# Patient Record
Sex: FEMALE | Race: WHITE | NOT HISPANIC OR LATINO | Employment: FULL TIME | URBAN - METROPOLITAN AREA
[De-identification: names, ages, dates, MRNs, and addresses within clinical notes are randomized per-mention and may not be internally consistent; named-entity substitution may affect disease eponyms.]

---

## 2017-02-03 ENCOUNTER — INITIAL PRENATAL (OUTPATIENT)
Dept: OBSTETRICS AND GYNECOLOGY | Facility: CLINIC | Age: 27
End: 2017-02-03

## 2017-02-03 ENCOUNTER — LAB VISIT (OUTPATIENT)
Dept: LAB | Facility: HOSPITAL | Age: 27
End: 2017-02-03
Attending: OBSTETRICS & GYNECOLOGY

## 2017-02-03 VITALS
BODY MASS INDEX: 21.98 KG/M2 | SYSTOLIC BLOOD PRESSURE: 110 MMHG | DIASTOLIC BLOOD PRESSURE: 68 MMHG | WEIGHT: 148.81 LBS

## 2017-02-03 DIAGNOSIS — Z32.01 PREGNANCY TEST POSITIVE: Primary | ICD-10-CM

## 2017-02-03 DIAGNOSIS — Z32.01 PREGNANCY TEST POSITIVE: ICD-10-CM

## 2017-02-03 DIAGNOSIS — Z34.01 ENCOUNTER FOR SUPERVISION OF NORMAL FIRST PREGNANCY IN FIRST TRIMESTER: ICD-10-CM

## 2017-02-03 LAB
BASOPHILS # BLD AUTO: 0.04 K/UL
BASOPHILS NFR BLD: 0.4 %
BILIRUB UR QL STRIP: NEGATIVE
CLARITY UR REFRACT.AUTO: ABNORMAL
COLOR UR AUTO: YELLOW
DIFFERENTIAL METHOD: ABNORMAL
EOSINOPHIL # BLD AUTO: 0.2 K/UL
EOSINOPHIL NFR BLD: 1.9 %
ERYTHROCYTE [DISTWIDTH] IN BLOOD BY AUTOMATED COUNT: 12.7 %
GLUCOSE UR QL STRIP: NEGATIVE
HCT VFR BLD AUTO: 36.3 %
HGB BLD-MCNC: 12.5 G/DL
HGB UR QL STRIP: NEGATIVE
KETONES UR QL STRIP: NEGATIVE
LEUKOCYTE ESTERASE UR QL STRIP: NEGATIVE
LYMPHOCYTES # BLD AUTO: 2.4 K/UL
LYMPHOCYTES NFR BLD: 21.9 %
MCH RBC QN AUTO: 30.1 PG
MCHC RBC AUTO-ENTMCNC: 34.4 %
MCV RBC AUTO: 88 FL
MONOCYTES # BLD AUTO: 0.9 K/UL
MONOCYTES NFR BLD: 8.2 %
NEUTROPHILS # BLD AUTO: 7.3 K/UL
NEUTROPHILS NFR BLD: 67.3 %
NITRITE UR QL STRIP: NEGATIVE
PH UR STRIP: 6 [PH] (ref 5–8)
PLATELET # BLD AUTO: 282 K/UL
PMV BLD AUTO: 10.8 FL
PROT UR QL STRIP: NEGATIVE
RBC # BLD AUTO: 4.15 M/UL
SP GR UR STRIP: 1.02 (ref 1–1.03)
URN SPEC COLLECT METH UR: ABNORMAL
UROBILINOGEN UR STRIP-ACNC: NEGATIVE EU/DL
WBC # BLD AUTO: 10.88 K/UL

## 2017-02-03 PROCEDURE — 86850 RBC ANTIBODY SCREEN: CPT

## 2017-02-03 PROCEDURE — 86900 BLOOD TYPING SEROLOGIC ABO: CPT

## 2017-02-03 PROCEDURE — 87086 URINE CULTURE/COLONY COUNT: CPT

## 2017-02-03 PROCEDURE — 88175 CYTOPATH C/V AUTO FLUID REDO: CPT

## 2017-02-03 PROCEDURE — 81003 URINALYSIS AUTO W/O SCOPE: CPT

## 2017-02-03 PROCEDURE — 99213 OFFICE O/P EST LOW 20 MIN: CPT | Mod: PBBFAC | Performed by: OBSTETRICS & GYNECOLOGY

## 2017-02-03 PROCEDURE — 87340 HEPATITIS B SURFACE AG IA: CPT

## 2017-02-03 PROCEDURE — 86703 HIV-1/HIV-2 1 RESULT ANTBDY: CPT

## 2017-02-03 PROCEDURE — 0502F SUBSEQUENT PRENATAL CARE: CPT | Mod: ,,, | Performed by: OBSTETRICS & GYNECOLOGY

## 2017-02-03 PROCEDURE — 85025 COMPLETE CBC W/AUTO DIFF WBC: CPT

## 2017-02-03 PROCEDURE — 86762 RUBELLA ANTIBODY: CPT

## 2017-02-03 PROCEDURE — 36415 COLL VENOUS BLD VENIPUNCTURE: CPT

## 2017-02-03 PROCEDURE — 99999 PR PBB SHADOW E&M-EST. PATIENT-LVL III: CPT | Mod: PBBFAC,,, | Performed by: OBSTETRICS & GYNECOLOGY

## 2017-02-03 PROCEDURE — 86592 SYPHILIS TEST NON-TREP QUAL: CPT

## 2017-02-03 PROCEDURE — 87591 N.GONORRHOEAE DNA AMP PROB: CPT

## 2017-02-03 NOTE — MR AVS SNAPSHOT
[facility] - OB/ GYN  [address]  [place] [address]  Phone: [phone]  Fax: [phone]                  Tawanna Harp   2/3/2017 1:15 PM   Initial Prenatal    Description:  Female : 1990   Provider:  Darlene Emerson MD   Department:  [facility] - [facility]/ [facility]           Reason for Visit     Initial Prenatal Visit           Diagnoses this Visit        Comments    Pregnancy test positive    -  Primary            To Do List           Future Appointments        Provider Department Dept Phone    2/3/2017 3:00 PM LAB, SAME DAY [facility] [phone]    2017 4:30 PM ULTRASOUND, OB-GYN [facility] OB/ [facility] [phone]    2017 5:00 PM CAMELIA Allred MD Counts include 234 beds at the [facility] OB/ [facility] [phone]      Goals (5 Years of Data)     None      [facility]     [facility] [place] [facility] Line - 24/7 Assistance  Registered nurses in the [facility] provide clinical advisement, health education, appointment booking, and other advisory services.  Call for this free service at [phone].             Medications           Message regarding Medications     Verify the changes and/or additions to your medication regime listed below are the same as discussed with your clinician today.  If any of these changes or additions are incorrect, please notify your healthcare provider.             Verify that the below list of medications is an accurate representation of the medications you are currently taking.  If none reported, the list may be blank. If incorrect, please contact your healthcare provider. Carry this list with you in case of emergency.                Clinical Reference Information           Prenatal Vitals     Enc. Date GA Prenatal Vitals Prenatal Pulse Pain Level Urine Albumin/Glucose Edema Presentation Dilation/Effacement/Station    2/3/17  110/68 / 67.5 kg (148 lb 13 oz)             Your Vitals Were     BP Weight Last Period BMI       110/68  67.5 kg (148 lb 13 oz) 12/07/2016 21.98 kg/m2       Allergies as of 2/3/2017     No Known Allergies      Immunizations Administered on Date of Encounter - 2/3/2017     None      Orders Placed During Today's Visit      Normal Orders This Visit    Urinalysis     Urine culture     Future Labs/Procedures Expected by Expires    CBC auto differential  2/3/2017 4/4/2018    Hepatitis B surface antigen  2/3/2017 4/4/2018    HIV-1 and HIV-2 antibodies  2/3/2017 4/4/2018    RPR  2/3/2017 4/4/2018    Rubella antibody, IgG  2/3/2017 4/4/2018    Type & Screen  2/3/2017 4/4/2018    US OB/GYN Procedure ([facility])  As directed 2/3/2018      Language Assistance Services     ATTENTION: Language assistance services are available, free of charge. Please call [phone].      ATENCIÓN: Si habla español, tiene a nixon disposición servicios gratuitos de asistencia lingüística. Llame al [phone].     CHÚ Ý: N?u b?n nói Ti?ng Vi?t, có các d?ch v? h? tr? ngôn ng? mi?n phí dành cho b?n. G?i s? [phone].         [facility] complies with applicable Federal civil rights laws and does not discriminate on the basis of race, color, national origin, age, disability, or sex.

## 2017-02-04 PROBLEM — Z34.00 ENCOUNTER FOR SUPERVISION OF NORMAL FIRST PREGNANCY: Status: ACTIVE | Noted: 2017-02-04

## 2017-02-04 LAB
ABO + RH BLD: NORMAL
BLD GP AB SCN CELLS X3 SERPL QL: NORMAL
RPR SER QL: NORMAL

## 2017-02-04 NOTE — PROGRESS NOTES
Patient is 26 year old G1 at 8 weeks EGA by LMP 16.  No complaints.  Mild nausea.  Light spotting and cramping several weeks ago, which has now resolved.  Pregnancy is planned.   x 4 months.      OB History    Para Term  AB SAB TAB Ectopic Multiple Living   1               # Outcome Date GA Lbr Jarad/2nd Weight Sex Delivery Anes PTL Lv   1 Current                   Dating reviewed    Allergies and problem list reviewed and updated    Medical and surgical history reviewed    Prenatal labs reviewed and updated    Physical Exam:  BREASTS:  No masses, nontender, no skin changes, normal nipples  ABD: soft, gravid, nontender  PELVIC: uterus 8 week size, cervix closed, no abnormalities  Pap and gen probe done.    Assessment:  Pregnancy at 8 weeks EGA    Plan:   Initial OB labs today.  Follow up in 4 weeks with dating ultrasound.

## 2017-02-05 LAB — BACTERIA UR CULT: NO GROWTH

## 2017-02-06 LAB
HBV SURFACE AG SERPL QL IA: NEGATIVE
HIV 1+2 AB+HIV1 P24 AG SERPL QL IA: NEGATIVE
RUBV IGG SER-ACNC: 8.6 IU/ML
RUBV IGG SER-IMP: ABNORMAL

## 2017-02-07 PROBLEM — O09.899 RUBELLA NON-IMMUNE STATUS, ANTEPARTUM: Status: ACTIVE | Noted: 2017-02-07

## 2017-02-07 PROBLEM — Z28.39 RUBELLA NON-IMMUNE STATUS, ANTEPARTUM: Status: ACTIVE | Noted: 2017-02-07

## 2017-02-07 LAB
C TRACH DNA SPEC QL NAA+PROBE: NEGATIVE
N GONORRHOEA DNA SPEC QL NAA+PROBE: NEGATIVE

## 2017-02-25 ENCOUNTER — NURSE TRIAGE (OUTPATIENT)
Dept: ADMINISTRATIVE | Facility: CLINIC | Age: 27
End: 2017-02-25

## 2017-02-25 ENCOUNTER — HOSPITAL ENCOUNTER (EMERGENCY)
Facility: HOSPITAL | Age: 27
Discharge: HOME | End: 2017-02-25
Attending: EMERGENCY MEDICINE

## 2017-02-25 VITALS
HEIGHT: 69 IN | HEART RATE: 65 BPM | WEIGHT: 148 LBS | RESPIRATION RATE: 16 BRPM | BODY MASS INDEX: 21.92 KG/M2 | SYSTOLIC BLOOD PRESSURE: 122 MMHG | OXYGEN SATURATION: 99 % | DIASTOLIC BLOOD PRESSURE: 67 MMHG | TEMPERATURE: 98 F

## 2017-02-25 DIAGNOSIS — O03.4 INCOMPLETE ABORTION: Primary | ICD-10-CM

## 2017-02-25 LAB
BILIRUB UR QL STRIP: NEGATIVE
CLARITY UR: CLEAR
COLOR UR: YELLOW
GLUCOSE UR QL STRIP: NEGATIVE
HGB UR QL STRIP: ABNORMAL
KETONES UR QL STRIP: NEGATIVE
LEUKOCYTE ESTERASE UR QL STRIP: NEGATIVE
MICROSCOPIC COMMENT: NORMAL
NITRITE UR QL STRIP: NEGATIVE
PH UR STRIP: 8 [PH] (ref 5–8)
PROT UR QL STRIP: NEGATIVE
RBC #/AREA URNS HPF: 2 /HPF (ref 0–4)
SP GR UR STRIP: 1.01 (ref 1–1.03)
URN SPEC COLLECT METH UR: ABNORMAL
UROBILINOGEN UR STRIP-ACNC: NEGATIVE EU/DL

## 2017-02-25 PROCEDURE — 81000 URINALYSIS NONAUTO W/SCOPE: CPT

## 2017-02-25 PROCEDURE — 99284 EMERGENCY DEPT VISIT MOD MDM: CPT

## 2017-02-25 NOTE — ED AVS SNAPSHOT
[facility]  [address] [address]               Tawanna Harp   2017 11:33 AM   ED    Description:  Female : 1990   Department:  [facility]            Your Care was Coordinated By:     Provider Role From To    Ruslan Rene MD Attending Provider 17 1133 --      Reason for Visit     Vaginal Bleeding           Diagnoses this Visit        Comments    Incomplete     -  Primary       ED Disposition     ED Disposition Condition Comment    Discharge             To Do List           Follow-up Information     Follow up with OB/GYN In 3 days.      [facility]     [facility] -  Assistance  Registered nurses in the [facility] provide clinical advisement, health education, appointment booking, and other advisory services.  Call for this free service at [phone].             Medications           Message regarding Medications     Verify the changes and/or additions to your medication regime listed below are the same as discussed with your clinician today.  If any of these changes or additions are incorrect, please notify your healthcare provider.             Verify that the below list of medications is an accurate representation of the medications you are currently taking.  If none reported, the list may be blank. If incorrect, please contact your healthcare provider. Carry this list with you in case of emergency.                Clinical Reference Information           Your Vitals Were     BP                   122/67 (BP Location: Right arm, Patient Position: Sitting)           Allergies as of 2017     No Known Allergies      Immunizations Administered on Date of Encounter - 2017     None      ED Micro, Lab, POCT     Start Ordered       Status Ordering Provider    17 1139 17 1138  Urinalysis  STAT      Final result     17 1138 17 1138   Urinalysis Microscopic  Once      Final result       ED Imaging Orders     Start Ordered       Status Ordering Provider    02/25/17 1204 02/25/17 1203  US OB Less Than 14 Wks First Gestation  1 time imaging      In process         Discharge Instructions         Understanding Miscarriage: Possible Causes  Miscarriage is common, but finding its cause may not be easy. If a cause can be found, its likely to be a problem with the baby or the structure of the uterus. Other factors cause miscarriage, but they are less common.  Problems with the baby  Either of the following problems with the baby can cause a miscarriage:  · There is a problem with the babys chromosomes (genes that carry the information needed for life).  · Birth defects  Problems with the uterus or cervix  Any of the following problems with the uterus or cervix can cause a miscarriage:  · The uterus may be divided (have a septum), or have fibroids or adhesions.  · The lining of the uterus may be too thin for the fertilized egg to implant.  · The cervix may be too weak to support the weight of a pregnancy.  Other factors  Any of the following problems can cause a miscarriage:  · A serious illness, such as uncontrolled dabetes mellitus.   · A bad injury, perhaps during a car accident.  · Exposure to toxins or radiation.  What does not cause miscarriage  Plenty of myths and old wives tales try to explain the cause of miscarriage. But they are fiction--not fact. None of the following activities causes miscarriage:  · Carrying groceries  · Lifting a small child  · Wearing high heels  · Coloring your hair  · Having sex  · Vacuuming · Working outside the home  · Being a vegetarian  · Eating spicy foods  · Having a Pap smear  · Riding a horse or a bicycle  · Wishing away or denying a pregnancy       Date Last Reviewed: 6/1/2016  © 7299-8119 [employer]. [address] [place] [address], [place] [address]. All rights reserved. This information is not  intended as a substitute for professional medical care. Always follow your healthcare professional's instructions.          Your Scheduled Appointments     Feb 28, 2017  4:30 PM CST   Ultrasound with ULTRASOUND, OB-GYN [facility]    [id number] [place] [id number]   [phone]            Feb 28, 2017  5:00 PM CST   Routine Prenatal Visit with CAMELIA Allred MD   [facility]    [id number] [place] [id number]   [phone]               [facility] complies with applicable Federal civil rights laws and does not discriminate on the basis of race, color, national origin, age, disability, or sex.        Language Assistance Services     ATTENTION: Language assistance services are available, free of charge. Please call [phone].      ATENCIÓN: Si habla español, tiene a nixon disposición servicios gratuitos de asistencia lingüística. Llame al [phone].     LAKSHMI Ý: N?u b?n nói Ti?ng Vi?t, có các d?ch v? h? tr? ngôn ng? mi?n phí beckyh cho b?n. G?i s? [phone].

## 2017-02-25 NOTE — DISCHARGE INSTRUCTIONS
Understanding Miscarriage: Possible Causes  Miscarriage is common, but finding its cause may not be easy. If a cause can be found, its likely to be a problem with the baby or the structure of the uterus. Other factors cause miscarriage, but they are less common.  Problems with the baby  Either of the following problems with the baby can cause a miscarriage:  · There is a problem with the babys chromosomes (genes that carry the information needed for life).  · Birth defects  Problems with the uterus or cervix  Any of the following problems with the uterus or cervix can cause a miscarriage:  · The uterus may be divided (have a septum), or have fibroids or adhesions.  · The lining of the uterus may be too thin for the fertilized egg to implant.  · The cervix may be too weak to support the weight of a pregnancy.  Other factors  Any of the following problems can cause a miscarriage:  · A serious illness, such as uncontrolled dabetes mellitus.   · A bad injury, perhaps during a car accident.  · Exposure to toxins or radiation.  What does not cause miscarriage  Plenty of myths and old wives tales try to explain the cause of miscarriage. But they are fiction--not fact. None of the following activities causes miscarriage:  · Carrying groceries  · Lifting a small child  · Wearing high heels  · Coloring your hair  · Having sex  · Vacuuming · Working outside the home  · Being a vegetarian  · Eating spicy foods  · Having a Pap smear  · Riding a horse or a bicycle  · Wishing away or denying a pregnancy       Date Last Reviewed: 6/1/2016  © 3296-3798 "[employer]. [address] [place] [address], [place] [address]. All rights reserved. This information is not intended as a substitute for professional medical care. Always follow your healthcare professional's instructions.

## 2017-02-25 NOTE — TELEPHONE ENCOUNTER
"  Reason for Disposition   [1] MODERATE vaginal bleeding (i.e., soaking 1 pad / hour; clots) AND [2] present > 6 hours    Answer Assessment - Initial Assessment Questions  1. ONSET: "When did this bleeding start?"        This morning   2. DESCRIPTION: "Describe the bleeding that you are having." "How much bleeding is there?"     - SPOTTING: spotting, or pinkish / brownish mucous discharge; does not fill panti-liner or pad     - MILD:  less than 1 pad / hour; less than patient's usual menstrual bleeding    - MODERATE: 1-2 pads / hour; small-medium blood clots (e.g., pea, grape, small coin)     - SEVERE: soaking 2 or more pads/hour for 2 or more hours; bleeding not contained by pads or continuous red blood from vagina; large blood clots (e.g., golf ball, large coin)       "period-like" bleeding twice while urinating (not on pad) blood eliminated in toilet; first void contained small blood clot  3. ABDOMINAL PAIN SEVERITY: If present, ask: "How bad is it?"  (e.g., Scale 1-10; mild, moderate, or severe)    - MILD (1-3): doesn't interfere with normal activities, abdomen soft and not tender to touch     - MODERATE (4-7): interferes with normal activities or awakens from sleep, tender to touch     - SEVERE (8-10): excruciating pain, doubled over, unable to do any normal activities      No  4. PREGNANCY: "Do you know how many weeks or months pregnant you are?" "When was the first day of your last normal menstrual period?"      11 weeks   5. HEMODYNAMIC STATUS: "Are you weak or feeling lightheaded?" If so, ask: "Can you stand and walk normally?"       No   6. OTHER SYMPTOMS: "What other symptoms are you having with the bleeding?" (e.g., passed tissue, vaginal discharge, fever, menstrual-type cramps)      No    Protocols used: ST PREGNANCY - VAGINAL BLEEDING LESS THAN 20 WEEKS EGA-A-    "

## 2017-02-25 NOTE — ED PROVIDER NOTES
SCRIBE #1 NOTE: I, Soha Mendoza, am scribing for, and in the presence of, Ruslan Rene MD. I have scribed the entire note.      History      Chief Complaint   Patient presents with    Vaginal Bleeding     pt is 11 weeks pregnant, woke up with heavy bleeding this am       Review of patient's allergies indicates:  No Known Allergies     HPI   HPI    2/25/2017, 11:41 AM   History obtained from the patient      History of Present Illness: Tawanna Harp is a 26 y.o. female patient who presents to the Emergency Department for vaginal bleeding which onset this morning. Pt reports she is 11 weeks pregnant. States her blood type is O positive. Reports Dr. Emerson is her OB. States she is due for her first US in 3 days. Sx have been constant and moderate in severity. No mitigating or exacerbating factors reported. No associated sx. Pt denies any abdominal pain, nausea, vomiting, pelvic pain, vaginal pain, vaginal discharge, dysuria, hematuria, urinary frequency, and all other sx. No further complaints or concerns at this time.        Arrival mode: Personal vehicle     PCP: Primary Doctor No       Past Medical History:  History reviewed. No pertinent past medical history.    Past Surgical History:  History reviewed. No pertinent surgical history.      Family History:  Family History   Problem Relation Age of Onset    Diabetes Paternal Grandmother     Cancer Sister        Social History:  Social History     Social History Main Topics    Smoking status: Never Smoker    Smokeless tobacco: Never Used    Alcohol use Not on file      Comment: cooks with it    Drug use: No    Sexual activity: Yes     Partners: Male       ROS   Review of Systems   Constitutional: Negative for fever.   HENT: Negative for sore throat.    Respiratory: Negative for shortness of breath.    Cardiovascular: Negative for chest pain.   Gastrointestinal: Negative for abdominal pain, nausea and vomiting.   Genitourinary: Positive for vaginal  bleeding. Negative for dysuria, pelvic pain, vaginal discharge and vaginal pain.   Musculoskeletal: Negative for back pain.   Skin: Negative for rash.   Neurological: Negative for weakness.   Hematological: Does not bruise/bleed easily.       Physical Exam    Initial Vitals   BP Pulse Resp Temp SpO2   02/25/17 1131 02/25/17 1131 02/25/17 1131 02/25/17 1131 02/25/17 1131   122/67 65 16 98.1 °F (36.7 °C) 99 %      Physical Exam  Nursing Notes and Vital Signs Reviewed.  Constitutional: Patient is in no acute distress. Awake and alert. Well-developed and well-nourished.  Head: Atraumatic. Normocephalic.  Eyes: PERRL. EOM intact. Conjunctivae are not pale. No scleral icterus.  ENT: Mucous membranes are moist. Oropharynx is clear and symmetric.    Neck: Supple. Full ROM. No lymphadenopathy.  Cardiovascular: Regular rate. Regular rhythm. No murmurs, rubs, or gallops. Distal pulses are 2+ and symmetric.  Pulmonary/Chest: No respiratory distress. Clear to auscultation bilaterally. No wheezing, rales, or rhonchi.  Abdominal: Soft and non-distended.  There is no tenderness.  No rebound, guarding, or rigidity. Good bowel sounds.  Genitourinary: No CVA tenderness  Pelvic: A female chaperone was present for this examination. Nl external inspection. No lesions or abnormalities were visible on the labia majora or minora. Cervical os is closed. There is no CMT. There is a moderate amount of blood in the vaginal vault. No discharge. No adnexal tenderness. No adnexal masses.   Musculoskeletal: Moves all extremities. No obvious deformities. No edema. No calf tenderness.  Skin: Warm and dry.  Neurological:  Alert, awake, and appropriate.  Normal speech.  No acute focal neurological deficits are appreciated.  Psychiatric: Normal affect. Good eye contact. Appropriate in content.    ED Course    Procedures  ED Vital Signs:  Vitals:    02/25/17 1131   BP: 122/67   Pulse: 65   Resp: 16   Temp: 98.1 °F (36.7 °C)   TempSrc: Oral   SpO2: 99%  "  Weight: 67.1 kg (148 lb)   Height: 5' 9" (1.753 m)       Abnormal Lab Results:  Labs Reviewed   URINALYSIS - Abnormal; Notable for the following:        Result Value    Occult Blood UA 3+ (*)     All other components within normal limits   URINALYSIS MICROSCOPIC        All Lab Results:  Results for orders placed or performed during the hospital encounter of 02/25/17   Urinalysis   Result Value Ref Range    Specimen UA Urine, Clean Catch     Color, UA Yellow Yellow, Straw, Jennifer    Appearance, UA Clear Clear    pH, UA 8.0 5.0 - 8.0    Specific Gravity, UA 1.010 1.005 - 1.030    Protein, UA Negative Negative    Glucose, UA Negative Negative    Ketones, UA Negative Negative    Bilirubin (UA) Negative Negative    Occult Blood UA 3+ (A) Negative    Nitrite, UA Negative Negative    Urobilinogen, UA Negative <2.0 EU/dL    Leukocytes, UA Negative Negative   Urinalysis Microscopic   Result Value Ref Range    RBC, UA 2 0 - 4 /hpf    Microscopic Comment SEE COMMENT          Imaging Results:  Imaging Results         US OB Less Than 14 Wks First Gestation (Final result) Result time:  02/25/17 13:15:20    Final result by Johnny Jaramillo Jr., MD (02/25/17 13:15:20)    Impression:     Findings are consistent with fetal demise.      Electronically signed by: JOHNNY JARAMILLO M.D.  Date:     02/25/17  Time:    13:15     Narrative:    EXAM: [id number] OB LESS THAN 14 WEEKS FIRST GESTATION    CLINICAL HISTORY: Heavy vaginal bleeding.    TECHNIQUE: Complete OB ultrasound less than 14 weeks gestation.    FINDINGS: There is a gestational sac within the uterine canal measuring about 3.7 cm.  There is an embryo within the gestational sac with a crown-rump length of 1.2 cm corresponding to a gestational age of 7 weeks 3 days.  There is no yolk sac evident.  There is no demonstrable fetal heart rate.    The right ovary measures 2.8 x 2.2 x 1.8 cm and demonstrates normal vascular perfusion.  It contains a cyst that measures 20 mm x 18 mm x 19 mm. "  The left ovary measures 3.2 x 2.0 x 1.4 cm it demonstrates normal vascular perfusion.                      The Emergency Provider reviewed the vital signs and test results, which are outlined above.    ED Discussion     Performed bedside US. No cardiac activity seen.    12:52 PM: Discussed with pt all pertinent ED information and results. Discussed pt dx and plan of tx. Gave pt all f/u and return to the ED instructions. All questions and concerns were addressed at this time. Pt expresses understanding of information and instructions, and is comfortable with plan to discharge. Pt is stable for discharge.    I discussed with patient and/or family/caretaker that evaluation in the ED does not suggest any emergent or life threatening medical conditions requiring immediate intervention beyond what was provided in the ED, and I believe patient is safe for discharge.  Regardless, an unremarkable evaluation in the ED does not preclude the development or presence of a serious of life threatening condition. As such, patient was instructed to return immediately for any worsening or change in current symptoms.      ED Medication(s):  Medications - No data to display    New Prescriptions    No medications on file       Follow-up Information     Follow up with OB/GYN In 3 days.            Medical Decision Making    Medical Decision Making:   Clinical Tests:   Lab Tests: Ordered and Reviewed  Radiological Study: Ordered and Reviewed           Scribe Attestation:   Scribe #1: I performed the above scribed service and the documentation accurately describes the services I performed. I attest to the accuracy of the note.    Attending:   Physician Attestation Statement for Scribe #1: I, Ruslan Rene MD, personally performed the services described in this documentation, as scribed by Soha Mendoza, in my presence, and it is both accurate and complete.          Clinical Impression       ICD-10-CM ICD-9-CM   1. Incomplete   O03.4 637.91       Disposition:   Disposition: Discharged  Condition: Stable         Ruslan Rene MD  02/25/17 1316

## 2017-02-27 ENCOUNTER — TELEPHONE (OUTPATIENT)
Dept: OBSTETRICS AND GYNECOLOGY | Facility: CLINIC | Age: 27
End: 2017-02-27

## 2017-02-27 NOTE — TELEPHONE ENCOUNTER
Pt called stated that she had a possible AB and would like to come in for sono and to see dr kunz and has apt on 3.2.17 ..klt

## 2017-02-27 NOTE — TELEPHONE ENCOUNTER
----- Message from Mert Garcia sent at 2/27/2017  8:37 AM CST -----  Contact: pt   States she miscarried this morning and was told to follow up/ pt would like to know when she should come in to be seen and should she come in for an US also &  can be reached at [phone]//lauri/prashanth

## 2017-03-02 ENCOUNTER — LAB VISIT (OUTPATIENT)
Dept: LAB | Facility: HOSPITAL | Age: 27
End: 2017-03-02
Attending: OBSTETRICS & GYNECOLOGY

## 2017-03-02 ENCOUNTER — OFFICE VISIT (OUTPATIENT)
Dept: OBSTETRICS AND GYNECOLOGY | Facility: CLINIC | Age: 27
End: 2017-03-02

## 2017-03-02 ENCOUNTER — PROCEDURE VISIT (OUTPATIENT)
Dept: OBSTETRICS AND GYNECOLOGY | Facility: CLINIC | Age: 27
End: 2017-03-02

## 2017-03-02 ENCOUNTER — TELEPHONE (OUTPATIENT)
Dept: OBSTETRICS AND GYNECOLOGY | Facility: CLINIC | Age: 27
End: 2017-03-02

## 2017-03-02 VITALS
DIASTOLIC BLOOD PRESSURE: 60 MMHG | HEIGHT: 69 IN | SYSTOLIC BLOOD PRESSURE: 100 MMHG | BODY MASS INDEX: 22.24 KG/M2 | WEIGHT: 150.13 LBS

## 2017-03-02 DIAGNOSIS — Z32.01 PREGNANCY TEST POSITIVE: ICD-10-CM

## 2017-03-02 DIAGNOSIS — O03.9 COMPLETE MISCARRIAGE: ICD-10-CM

## 2017-03-02 DIAGNOSIS — O03.9 COMPLETE MISCARRIAGE: Primary | ICD-10-CM

## 2017-03-02 PROBLEM — Z34.00 ENCOUNTER FOR SUPERVISION OF NORMAL FIRST PREGNANCY: Status: RESOLVED | Noted: 2017-02-04 | Resolved: 2017-03-02

## 2017-03-02 LAB
BASOPHILS # BLD AUTO: 0.04 K/UL
BASOPHILS NFR BLD: 0.4 %
DIFFERENTIAL METHOD: NORMAL
EOSINOPHIL # BLD AUTO: 0.3 K/UL
EOSINOPHIL NFR BLD: 3.7 %
ERYTHROCYTE [DISTWIDTH] IN BLOOD BY AUTOMATED COUNT: 12.4 %
HCG INTACT+B SERPL-ACNC: 206 MIU/ML
HCT VFR BLD AUTO: 38.5 %
HGB BLD-MCNC: 12.9 G/DL
LYMPHOCYTES # BLD AUTO: 2.9 K/UL
LYMPHOCYTES NFR BLD: 31.7 %
MCH RBC QN AUTO: 29.2 PG
MCHC RBC AUTO-ENTMCNC: 33.5 %
MCV RBC AUTO: 87 FL
MONOCYTES # BLD AUTO: 0.7 K/UL
MONOCYTES NFR BLD: 7.4 %
NEUTROPHILS # BLD AUTO: 5.2 K/UL
NEUTROPHILS NFR BLD: 56.6 %
PLATELET # BLD AUTO: 282 K/UL
PMV BLD AUTO: 10.5 FL
RBC # BLD AUTO: 4.42 M/UL
TSH SERPL DL<=0.005 MIU/L-ACNC: 1.38 UIU/ML
WBC # BLD AUTO: 9.27 K/UL

## 2017-03-02 PROCEDURE — 99212 OFFICE O/P EST SF 10 MIN: CPT | Mod: S$PBB,,, | Performed by: OBSTETRICS & GYNECOLOGY

## 2017-03-02 PROCEDURE — 99999 PR PBB SHADOW E&M-EST. PATIENT-LVL III: CPT | Mod: PBBFAC,,, | Performed by: OBSTETRICS & GYNECOLOGY

## 2017-03-02 PROCEDURE — 84702 CHORIONIC GONADOTROPIN TEST: CPT

## 2017-03-02 PROCEDURE — 76817 TRANSVAGINAL US OBSTETRIC: CPT | Mod: 26,S$PBB,, | Performed by: OBSTETRICS & GYNECOLOGY

## 2017-03-02 PROCEDURE — 76817 TRANSVAGINAL US OBSTETRIC: CPT | Mod: PBBFAC | Performed by: OBSTETRICS & GYNECOLOGY

## 2017-03-02 PROCEDURE — 36415 COLL VENOUS BLD VENIPUNCTURE: CPT

## 2017-03-02 PROCEDURE — 99213 OFFICE O/P EST LOW 20 MIN: CPT | Mod: PBBFAC,25 | Performed by: OBSTETRICS & GYNECOLOGY

## 2017-03-02 PROCEDURE — 84443 ASSAY THYROID STIM HORMONE: CPT

## 2017-03-02 PROCEDURE — 85025 COMPLETE CBC W/AUTO DIFF WBC: CPT

## 2017-03-02 NOTE — MR AVS SNAPSHOT
"    Count includes the [facility] - OB/ GYN  [address]  [place] [address]  Phone: [phone]  Fax: [phone]                  Tawanna Harp   3/2/2017 3:45 PM   Office Visit    Description:  Female : 1990   Provider:  Darlene Emerson MD   [facility]  [facility]           Reason for Visit     Miscarriage           Diagnoses this Visit        Comments    Complete miscarriage    -  Primary            To Do List           Future Appointments        Provider Department Dept Phone    3/2/2017 4:45 PM LAB, SAME DAY [facility] [phone]      Goals (5 Years of Data)     None      [facility]     [facility] -  Assistance  Registered nurses in the [facility] provide clinical advisement, health education, appointment booking, and other advisory services.  Call for this free service at [phone].             Medications           Message regarding Medications     Verify the changes and/or additions to your medication regime listed below are the same as discussed with your clinician today.  If any of these changes or additions are incorrect, please notify your healthcare provider.             Verify that the below list of medications is an accurate representation of the medications you are currently taking.  If none reported, the list may be blank. If incorrect, please contact your healthcare provider. Carry this list with you in case of emergency.                Clinical Reference Information           Prenatal Vitals     Enc. Date GA Prenatal Vitals Prenatal Pulse Pain Level Urine Albumin/Glucose Edema Presentation Dilation/Effacement/Station    3/2/17 12w1d 100/60 / 68.1 kg (150 lb 2.1 oz)           2/3/17 8w2d 110/68 / 67.5 kg (148 lb 13 oz)           2/3/17 8w2d 110/68 / 67.5 kg (148 lb 13 oz)     None / None         TW.1 kg (2 lb 6.8 oz)   Pregravid weight: 67 kg (147 lb 11.3 oz)   Height: 5' 9" (1.753 m)   BMI: " 21.8       Your Vitals Were     BP                   100/60           Blood Pressure          Most Recent Value    BP  100/60      Allergies as of 3/2/2017     No Known Allergies      Immunizations Administered on Date of Encounter - 3/2/2017     None      Orders Placed During Today's Visit     Future Labs/Procedures Expected by Expires    CBC auto differential  3/2/2017 5/1/2018    hCG, quantitative  3/2/2017 5/1/2018    TSH  3/2/2017 5/1/2018      Language Assistance Services     ATTENTION: Language assistance services are available, free of charge. Please call [phone].      ATENCIÓN: Si habla español, tiene a nixon disposición servicios gratuitos de asistencia lingüística. Llame al [phone].     CHÚ Ý: N?u b?n nói Ti?ng Vi?t, có các d?ch v? h? tr? ngôn ng? mi?n phí dành cho b?n. G?i s? [phone].         [facility] complies with applicable Federal civil rights laws and does not discriminate on the basis of race, color, national origin, age, disability, or sex.

## 2017-03-03 ENCOUNTER — TELEPHONE (OUTPATIENT)
Dept: OBSTETRICS AND GYNECOLOGY | Facility: CLINIC | Age: 27
End: 2017-03-03

## 2017-03-03 DIAGNOSIS — O03.9 COMPLETE MISCARRIAGE: Primary | ICD-10-CM

## 2017-03-08 ENCOUNTER — PATIENT MESSAGE (OUTPATIENT)
Dept: OBSTETRICS AND GYNECOLOGY | Facility: CLINIC | Age: 27
End: 2017-03-08

## 2017-03-16 ENCOUNTER — LAB VISIT (OUTPATIENT)
Dept: LAB | Facility: HOSPITAL | Age: 27
End: 2017-03-16
Attending: OBSTETRICS & GYNECOLOGY

## 2017-03-16 DIAGNOSIS — O03.9 COMPLETE MISCARRIAGE: ICD-10-CM

## 2017-03-16 LAB — HCG INTACT+B SERPL-ACNC: 17 MIU/ML

## 2017-03-16 PROCEDURE — 36415 COLL VENOUS BLD VENIPUNCTURE: CPT | Mod: PO

## 2017-03-16 PROCEDURE — 84702 CHORIONIC GONADOTROPIN TEST: CPT

## 2017-06-09 ENCOUNTER — PATIENT MESSAGE (OUTPATIENT)
Dept: OBSTETRICS AND GYNECOLOGY | Facility: CLINIC | Age: 27
End: 2017-06-09

## 2017-07-21 ENCOUNTER — LAB VISIT (OUTPATIENT)
Dept: LAB | Facility: HOSPITAL | Age: 27
End: 2017-07-21
Attending: NURSE PRACTITIONER

## 2017-07-21 ENCOUNTER — OFFICE VISIT (OUTPATIENT)
Dept: OBSTETRICS AND GYNECOLOGY | Facility: CLINIC | Age: 27
End: 2017-07-21

## 2017-07-21 VITALS — BODY MASS INDEX: 21.98 KG/M2 | WEIGHT: 148.38 LBS | HEIGHT: 69 IN

## 2017-07-21 DIAGNOSIS — N92.1 MENORRHAGIA WITH IRREGULAR CYCLE: ICD-10-CM

## 2017-07-21 DIAGNOSIS — N92.1 MENORRHAGIA WITH IRREGULAR CYCLE: Primary | ICD-10-CM

## 2017-07-21 PROBLEM — O09.899 RUBELLA NON-IMMUNE STATUS, ANTEPARTUM: Status: RESOLVED | Noted: 2017-02-07 | Resolved: 2017-07-21

## 2017-07-21 PROBLEM — Z28.39 RUBELLA NON-IMMUNE STATUS, ANTEPARTUM: Status: RESOLVED | Noted: 2017-02-07 | Resolved: 2017-07-21

## 2017-07-21 LAB
BASOPHILS # BLD AUTO: 0.06 K/UL
BASOPHILS NFR BLD: 0.6 %
DIFFERENTIAL METHOD: NORMAL
EOSINOPHIL # BLD AUTO: 0.4 K/UL
EOSINOPHIL NFR BLD: 4.1 %
ERYTHROCYTE [DISTWIDTH] IN BLOOD BY AUTOMATED COUNT: 12.9 %
HCG INTACT+B SERPL-ACNC: <1.2 MIU/ML
HCT VFR BLD AUTO: 39.5 %
HGB BLD-MCNC: 13.2 G/DL
LYMPHOCYTES # BLD AUTO: 3.3 K/UL
LYMPHOCYTES NFR BLD: 32.4 %
MCH RBC QN AUTO: 28.9 PG
MCHC RBC AUTO-ENTMCNC: 33.4 G/DL
MCV RBC AUTO: 86 FL
MONOCYTES # BLD AUTO: 0.9 K/UL
MONOCYTES NFR BLD: 8.7 %
NEUTROPHILS # BLD AUTO: 5.4 K/UL
NEUTROPHILS NFR BLD: 54 %
PLATELET # BLD AUTO: 276 K/UL
PMV BLD AUTO: 10.2 FL
PROLACTIN SERPL IA-MCNC: 13.5 NG/ML
RBC # BLD AUTO: 4.57 M/UL
TSH SERPL DL<=0.005 MIU/L-ACNC: 1.2 UIU/ML
WBC # BLD AUTO: 10.02 K/UL

## 2017-07-21 PROCEDURE — 99999 PR PBB SHADOW E&M-EST. PATIENT-LVL II: CPT | Mod: PBBFAC,,, | Performed by: NURSE PRACTITIONER

## 2017-07-21 PROCEDURE — 84443 ASSAY THYROID STIM HORMONE: CPT

## 2017-07-21 PROCEDURE — 85025 COMPLETE CBC W/AUTO DIFF WBC: CPT

## 2017-07-21 PROCEDURE — 99213 OFFICE O/P EST LOW 20 MIN: CPT | Mod: S$PBB,,, | Performed by: NURSE PRACTITIONER

## 2017-07-21 PROCEDURE — 84702 CHORIONIC GONADOTROPIN TEST: CPT

## 2017-07-21 PROCEDURE — 36415 COLL VENOUS BLD VENIPUNCTURE: CPT

## 2017-07-21 PROCEDURE — 84146 ASSAY OF PROLACTIN: CPT

## 2017-07-21 PROCEDURE — 99212 OFFICE O/P EST SF 10 MIN: CPT | Mod: PBBFAC | Performed by: NURSE PRACTITIONER

## 2017-07-21 NOTE — PROGRESS NOTES
"CC: Heavy irregular cycles    Tawanna Harp is a 27 y.o. female  presents for for c/o heavy and irregular cycles after a SAB in March. Patient states that her cycles have been heavy and irregular for the past 2 months. No pelvic pain, but was told she had " ovarian cysts".       History reviewed. No pertinent past medical history.  History reviewed. No pertinent surgical history.  Social History     Social History    Marital status:      Spouse name: N/A    Number of children: N/A    Years of education: N/A     Occupational History    Not on file.     Social History Main Topics    Smoking status: Never Smoker    Smokeless tobacco: Never Used    Alcohol use Not on file      Comment: cooks with it    Drug use: No    Sexual activity: Yes     Partners: Male     Other Topics Concern    Not on file     Social History Narrative    No narrative on file     Family History   Problem Relation Age of Onset    Diabetes Paternal Grandmother     Cancer Sister      OB History      Para Term  AB Living    1       1      SAB TAB Ectopic Multiple Live Births    1                  Ht 5' 9" (1.753 m)   Wt 67.3 kg (148 lb 5.9 oz)   BMI 21.91 kg/m²       ROS:  GENERAL: Denies weight gain or weight loss. Feeling well overall.   SKIN: Denies rash or lesions.   HEAD: Denies head injury or headache.   NODES: Denies enlarged lymph nodes.   CHEST: Denies chest pain or shortness of breath.   CARDIOVASCULAR: Denies palpitations or left sided chest pain.   ABDOMEN: No abdominal pain, constipation, diarrhea, nausea, vomiting or rectal bleeding.   URINARY: No frequency, dysuria, hematuria, or burning on urination.  REPRODUCTIVE: See HPI.   BREASTS: The patient performs breast self-examination and denies pain, lumps, or nipple discharge.   HEMATOLOGIC: No easy bruisability or excessive bleeding.   MUSCULOSKELETAL: Denies joint pain or swelling.   NEUROLOGIC: Denies syncope or weakness.   PSYCHIATRIC: " Denies depression, anxiety or mood swings.    PHYSICAL EXAM:  APPEARANCE: Well nourished, well developed, in no acute distress.  AFFECT: WNL, alert and oriented x 3  SKIN: No acne or hirsutism  NECK: Neck symmetric without masses or thyromegaly  CHEST: Good respiratory effect  ABDOMEN: Soft.  No tenderness or masses.    PELVIC:On cycle today; deferred    1. Menorrhagia with irregular cycle  TSH    CBC auto differential    US Pelvis Limited Non OB    Prolactin    PLAN:  Labs  Pelvic ultrasound

## 2017-10-13 ENCOUNTER — PATIENT MESSAGE (OUTPATIENT)
Dept: OBSTETRICS AND GYNECOLOGY | Facility: CLINIC | Age: 27
End: 2017-10-13

## 2017-10-13 DIAGNOSIS — O20.0 THREATENED MISCARRIAGE IN EARLY PREGNANCY: ICD-10-CM

## 2017-10-13 DIAGNOSIS — N91.2 AMENORRHEA, UNSPECIFIED: Primary | ICD-10-CM

## 2017-10-16 NOTE — TELEPHONE ENCOUNTER
Due to history of prior miscarriage, please schedule OB ultrasound at time of risk assessment appt.  Order placed.

## 2017-10-16 NOTE — TELEPHONE ENCOUNTER
Patient has her risk assessment appointment scheduled does she need an ultrasound at that visit or wait until her new ob visit?

## 2017-10-18 ENCOUNTER — LAB VISIT (OUTPATIENT)
Dept: LAB | Facility: HOSPITAL | Age: 27
End: 2017-10-18
Attending: OBSTETRICS & GYNECOLOGY

## 2017-10-18 DIAGNOSIS — O20.0 THREATENED MISCARRIAGE IN EARLY PREGNANCY: ICD-10-CM

## 2017-10-18 LAB
BASOPHILS # BLD AUTO: 0.06 K/UL
BASOPHILS NFR BLD: 0.6 %
DIFFERENTIAL METHOD: NORMAL
EOSINOPHIL # BLD AUTO: 0.3 K/UL
EOSINOPHIL NFR BLD: 3.4 %
ERYTHROCYTE [DISTWIDTH] IN BLOOD BY AUTOMATED COUNT: 12.2 %
HCG INTACT+B SERPL-ACNC: 11 MIU/ML
HCT VFR BLD AUTO: 39.1 %
HGB BLD-MCNC: 12.9 G/DL
IMM GRANULOCYTES # BLD AUTO: 0.02 K/UL
IMM GRANULOCYTES NFR BLD AUTO: 0.2 %
LYMPHOCYTES # BLD AUTO: 3 K/UL
LYMPHOCYTES NFR BLD: 32.3 %
MCH RBC QN AUTO: 29 PG
MCHC RBC AUTO-ENTMCNC: 33 G/DL
MCV RBC AUTO: 88 FL
MONOCYTES # BLD AUTO: 0.7 K/UL
MONOCYTES NFR BLD: 7.1 %
NEUTROPHILS # BLD AUTO: 5.2 K/UL
NEUTROPHILS NFR BLD: 56.4 %
NRBC BLD-RTO: 0 /100 WBC
PLATELET # BLD AUTO: 265 K/UL
PMV BLD AUTO: 10.7 FL
PROGEST SERPL-MCNC: 0.3 NG/ML
RBC # BLD AUTO: 4.45 M/UL
WBC # BLD AUTO: 9.3 K/UL

## 2017-10-18 PROCEDURE — 36415 COLL VENOUS BLD VENIPUNCTURE: CPT | Mod: PO

## 2017-10-18 PROCEDURE — 84702 CHORIONIC GONADOTROPIN TEST: CPT

## 2017-10-18 PROCEDURE — 84144 ASSAY OF PROGESTERONE: CPT

## 2017-10-18 PROCEDURE — 85025 COMPLETE CBC W/AUTO DIFF WBC: CPT

## 2017-11-10 ENCOUNTER — LAB VISIT (OUTPATIENT)
Dept: LAB | Facility: HOSPITAL | Age: 27
End: 2017-11-10
Attending: OBSTETRICS & GYNECOLOGY

## 2017-11-10 ENCOUNTER — OFFICE VISIT (OUTPATIENT)
Dept: OBSTETRICS AND GYNECOLOGY | Facility: CLINIC | Age: 27
End: 2017-11-10

## 2017-11-10 VITALS
SYSTOLIC BLOOD PRESSURE: 108 MMHG | HEIGHT: 69 IN | DIASTOLIC BLOOD PRESSURE: 70 MMHG | WEIGHT: 149 LBS | BODY MASS INDEX: 22.07 KG/M2

## 2017-11-10 DIAGNOSIS — O03.9 MISCARRIAGE: ICD-10-CM

## 2017-11-10 DIAGNOSIS — O03.9 MISCARRIAGE: Primary | ICD-10-CM

## 2017-11-10 DIAGNOSIS — N92.6 IRREGULAR MENSES: ICD-10-CM

## 2017-11-10 LAB
HCG INTACT+B SERPL-ACNC: <1.2 MIU/ML
TSH SERPL DL<=0.005 MIU/L-ACNC: 1.46 UIU/ML

## 2017-11-10 PROCEDURE — 84443 ASSAY THYROID STIM HORMONE: CPT

## 2017-11-10 PROCEDURE — 84702 CHORIONIC GONADOTROPIN TEST: CPT

## 2017-11-10 PROCEDURE — 36415 COLL VENOUS BLD VENIPUNCTURE: CPT

## 2017-11-10 PROCEDURE — 99999 PR PBB SHADOW E&M-EST. PATIENT-LVL II: CPT | Mod: PBBFAC,,, | Performed by: OBSTETRICS & GYNECOLOGY

## 2017-11-10 PROCEDURE — 99213 OFFICE O/P EST LOW 20 MIN: CPT | Mod: S$PBB,,, | Performed by: OBSTETRICS & GYNECOLOGY

## 2017-11-10 PROCEDURE — 99212 OFFICE O/P EST SF 10 MIN: CPT | Mod: PBBFAC | Performed by: OBSTETRICS & GYNECOLOGY

## 2017-11-16 NOTE — PROGRESS NOTES
"Tawanna Harp is a 27 y.o. female  presents today for follow up of recent miscarriage.  She had a very early SAB 4 weeks ago.  Last HCG level was 11 on 10/18.  She reports she has had continued daily bleeding and cramping since the miscarriage.      History reviewed. No pertinent past medical history.  History reviewed. No pertinent surgical history.  Family History   Problem Relation Age of Onset    Diabetes Paternal Grandmother     Cancer Sister      Social History   Substance Use Topics    Smoking status: Never Smoker    Smokeless tobacco: Never Used    Alcohol use No      Comment: cooks with it     OB History    Para Term  AB Living   2       2     SAB TAB Ectopic Multiple Live Births   2              # Outcome Date GA Lbr Jarad/2nd Weight Sex Delivery Anes PTL Lv   2 SAB            1 SAB                   /70   Ht 5' 9" (1.753 m)   Wt 67.6 kg (149 lb)   BMI 22.00 kg/m²     ROS:  GENERAL: No fever, chills, fatigability or weight loss.  VULVAR: No pain, no lesions and no itching.  VAGINAL: No relaxation, no itching, no discharge, and no lesions.  ABDOMEN: No abdominal pain. Denies nausea. Denies vomiting. No diarrhea. No constipation  BREAST: Denies pain. No lumps. No discharge.  URINARY: No incontinence, no nocturia, no frequency and no dysuria.      PHYSICAL EXAM:    APPEARANCE:  Well nourished, well developed, in no acute distress  ABDOMEN:  Soft, no tenderness or masses, no distension noted  PELVIC:  VULVA:  No lesions.  Normal female genitalia  URETHRAL MEATUS:  Normal size and location.  No lesions.  No prolapse  URETHRA:  No masses, tenderness, prolapse, or scarring  VAGINA:  No lesions or discharge.  No significant cystocoele or rectocoele  CERVIX:  No lesions.  Normal diameter, no cervical motion tenderness.  UTERUS:  4-6 week size, regular shape, mobile, non-tender, normal position, good support  ADNEXA:  No masses or tenderness  ANUS AND PERINEUM:  No lesions.  No " external hemorrhoids      ASSESSMENT:    1. Miscarriage  hCG, quantitative    TSH   2. Irregular menses  US Pelvis Comp with Transvag NON-OB (xpd           PLAN  Patient counseled for 15 minutes regarding possible causes for continued bleeding at this point.  Will check HCG, TSH, and pelvic u/s.  Bleeding and infection precautions given.

## 2017-11-17 ENCOUNTER — TELEPHONE (OUTPATIENT)
Dept: RADIOLOGY | Facility: HOSPITAL | Age: 27
End: 2017-11-17

## 2017-11-20 ENCOUNTER — HOSPITAL ENCOUNTER (OUTPATIENT)
Dept: RADIOLOGY | Facility: HOSPITAL | Age: 27
Discharge: HOME | End: 2017-11-20
Attending: OBSTETRICS & GYNECOLOGY

## 2017-11-20 DIAGNOSIS — N92.6 IRREGULAR MENSES: ICD-10-CM

## 2017-11-20 PROCEDURE — 76856 US EXAM PELVIC COMPLETE: CPT | Mod: TC

## 2017-11-20 PROCEDURE — 76830 TRANSVAGINAL US NON-OB: CPT | Mod: 26,,, | Performed by: RADIOLOGY

## 2017-11-20 PROCEDURE — 76856 US EXAM PELVIC COMPLETE: CPT | Mod: 26,,, | Performed by: RADIOLOGY

## 2017-12-15 ENCOUNTER — OFFICE VISIT (OUTPATIENT)
Dept: OBSTETRICS AND GYNECOLOGY | Facility: CLINIC | Age: 27
End: 2017-12-15

## 2017-12-15 VITALS
WEIGHT: 152 LBS | SYSTOLIC BLOOD PRESSURE: 122 MMHG | BODY MASS INDEX: 22.51 KG/M2 | DIASTOLIC BLOOD PRESSURE: 70 MMHG | HEIGHT: 69 IN

## 2017-12-15 DIAGNOSIS — N96 RECURRENT PREGNANCY LOSS WITHOUT CURRENT PREGNANCY: ICD-10-CM

## 2017-12-15 DIAGNOSIS — Q51.28 SEPTATE UTERUS: ICD-10-CM

## 2017-12-15 PROCEDURE — 99999 PR PBB SHADOW E&M-EST. PATIENT-LVL II: CPT | Mod: PBBFAC,,, | Performed by: OBSTETRICS & GYNECOLOGY

## 2017-12-15 PROCEDURE — 99212 OFFICE O/P EST SF 10 MIN: CPT | Mod: PBBFAC | Performed by: OBSTETRICS & GYNECOLOGY

## 2017-12-15 PROCEDURE — 99212 OFFICE O/P EST SF 10 MIN: CPT | Mod: S$PBB,,, | Performed by: OBSTETRICS & GYNECOLOGY

## 2017-12-16 PROBLEM — Q51.28 SEPTATE UTERUS: Status: ACTIVE | Noted: 2017-12-16

## 2017-12-16 PROBLEM — N96 RECURRENT PREGNANCY LOSS WITHOUT CURRENT PREGNANCY: Status: ACTIVE | Noted: 2017-12-16

## 2017-12-19 ENCOUNTER — PATIENT MESSAGE (OUTPATIENT)
Dept: OBSTETRICS AND GYNECOLOGY | Facility: CLINIC | Age: 27
End: 2017-12-19

## 2017-12-20 ENCOUNTER — TELEPHONE (OUTPATIENT)
Dept: OBSTETRICS AND GYNECOLOGY | Facility: CLINIC | Age: 27
End: 2017-12-20

## 2017-12-20 DIAGNOSIS — Q51.28 SEPTATE UTERUS: ICD-10-CM

## 2017-12-20 DIAGNOSIS — N96 RECURRENT PREGNANCY LOSS WITHOUT CURRENT PREGNANCY: Primary | ICD-10-CM

## 2017-12-29 ENCOUNTER — ANESTHESIA EVENT (OUTPATIENT)
Dept: SURGERY | Facility: HOSPITAL | Age: 27
End: 2017-12-29

## 2017-12-29 ENCOUNTER — HOSPITAL ENCOUNTER (OUTPATIENT)
Dept: PREADMISSION TESTING | Facility: HOSPITAL | Age: 27
Discharge: HOME | End: 2017-12-29
Attending: OBSTETRICS & GYNECOLOGY

## 2017-12-29 VITALS — WEIGHT: 148 LBS | BODY MASS INDEX: 21.92 KG/M2 | HEIGHT: 69 IN

## 2017-12-29 NOTE — PLAN OF CARE
Patient visit for pre op instructions.  Pre operative instruction booklet given  Hibiclens given to patient with showering instructions  Spouse present for PAT appt

## 2017-12-29 NOTE — DISCHARGE INSTRUCTIONS
To confirm, Your doctor has instructed you that surgery is scheduled for 01/02/18  at  1:30 p.m.       Please report to [facility], [address], 1st floor, main lobby by 12:00 p.m.  Pre admit office will call afternoon prior to surgery with final arrival time    INSTRUCTIONS IMPORTANT!!!   Do not eat, drink, or smoke after 12 midnight,may have water and clear juice until 3 hours prior to surgery. OK to brush teeth, no gum, candy or mints!    ¨ Take only these medicines with a small swallow of water-morning of surgery.  N/A        Pre operative instructions:  Please review the Pre-Operative Instruction booklet that you were given.        Bathing Instructions--See page 6 in the Pre-operative booklet.      Prevention of surgical site infections:     -Keep incisions clean and dry.   -Do not soak/submerge incisions in water until completely healed.   -Do not apply lotions, powders, creams, or deodorants to site.   -Always make sure hands are cleaned with antibacterial soap/ alcohol-based                 prior to touching the surgical site.  (This includes doctors,                 nurses, staff, and yourself.)    Signs and symptoms:   -Redness and pain around the area where you had surgery   -Drainage of cloudy fluid from your surgical wound   -Fever over 100.4       I have read or had read and explained to me, and understand the above information.  Additional comments or instructions:  Received a copy of Pre-operative instructions booklet, FAQ surgical site infection sheet, and packets of hibiclens (if indicated).

## 2018-01-02 ENCOUNTER — ANESTHESIA (OUTPATIENT)
Dept: SURGERY | Facility: HOSPITAL | Age: 28
End: 2018-01-02

## 2018-01-02 ENCOUNTER — HOSPITAL ENCOUNTER (OUTPATIENT)
Facility: HOSPITAL | Age: 28
Discharge: HOME | End: 2018-01-02
Attending: OBSTETRICS & GYNECOLOGY | Admitting: OBSTETRICS & GYNECOLOGY

## 2018-01-02 ENCOUNTER — TELEPHONE (OUTPATIENT)
Dept: OBSTETRICS AND GYNECOLOGY | Facility: CLINIC | Age: 28
End: 2018-01-02

## 2018-01-02 DIAGNOSIS — Q51.28 SEPTATE UTERUS: ICD-10-CM

## 2018-01-02 PROCEDURE — 63600175 PHARM REV CODE 636 W HCPCS: Performed by: NURSE ANESTHETIST, CERTIFIED REGISTERED

## 2018-01-02 PROCEDURE — 58560 HYSTEROSCOPY RESECT SEPTUM: CPT | Mod: ,,, | Performed by: OBSTETRICS & GYNECOLOGY

## 2018-01-02 PROCEDURE — 37000008 HC ANESTHESIA 1ST 15 MINUTES: Performed by: OBSTETRICS & GYNECOLOGY

## 2018-01-02 PROCEDURE — 36000709 HC OR TIME LEV III EA ADD 15 MIN: Performed by: OBSTETRICS & GYNECOLOGY

## 2018-01-02 PROCEDURE — 71000033 HC RECOVERY, INTIAL HOUR: Performed by: OBSTETRICS & GYNECOLOGY

## 2018-01-02 PROCEDURE — 25000003 PHARM REV CODE 250: Performed by: OBSTETRICS & GYNECOLOGY

## 2018-01-02 PROCEDURE — 88305 TISSUE EXAM BY PATHOLOGIST: CPT | Performed by: PATHOLOGY

## 2018-01-02 PROCEDURE — C1782 MORCELLATOR: HCPCS | Performed by: OBSTETRICS & GYNECOLOGY

## 2018-01-02 PROCEDURE — 63600175 PHARM REV CODE 636 W HCPCS: Performed by: ANESTHESIOLOGY

## 2018-01-02 PROCEDURE — 25000003 PHARM REV CODE 250: Performed by: NURSE ANESTHETIST, CERTIFIED REGISTERED

## 2018-01-02 PROCEDURE — 27201423 OPTIME MED/SURG SUP & DEVICES STERILE SUPPLY: Performed by: OBSTETRICS & GYNECOLOGY

## 2018-01-02 PROCEDURE — 36000708 HC OR TIME LEV III 1ST 15 MIN: Performed by: OBSTETRICS & GYNECOLOGY

## 2018-01-02 PROCEDURE — 71000015 HC POSTOP RECOV 1ST HR: Performed by: OBSTETRICS & GYNECOLOGY

## 2018-01-02 PROCEDURE — 88305 TISSUE EXAM BY PATHOLOGIST: CPT | Mod: 26,,, | Performed by: PATHOLOGY

## 2018-01-02 PROCEDURE — 37000009 HC ANESTHESIA EA ADD 15 MINS: Performed by: OBSTETRICS & GYNECOLOGY

## 2018-01-02 RX ORDER — ONDANSETRON 2 MG/ML
4 INJECTION INTRAMUSCULAR; INTRAVENOUS ONCE
Status: COMPLETED | OUTPATIENT
Start: 2018-01-02 | End: 2018-01-02

## 2018-01-02 RX ORDER — LIDOCAINE HYDROCHLORIDE 10 MG/ML
5 INJECTION, SOLUTION EPIDURAL; INFILTRATION; INTRACAUDAL; PERINEURAL ONCE
Status: DISCONTINUED | OUTPATIENT
Start: 2018-01-02 | End: 2018-01-02 | Stop reason: HOSPADM

## 2018-01-02 RX ORDER — PROPOFOL 10 MG/ML
VIAL (ML) INTRAVENOUS
Status: DISCONTINUED | OUTPATIENT
Start: 2018-01-02 | End: 2018-01-02

## 2018-01-02 RX ORDER — ACETAMINOPHEN 10 MG/ML
1000 INJECTION, SOLUTION INTRAVENOUS ONCE
Status: COMPLETED | OUTPATIENT
Start: 2018-01-02 | End: 2018-01-02

## 2018-01-02 RX ORDER — LIDOCAINE HCL/PF 100 MG/5ML
SYRINGE (ML) INTRAVENOUS
Status: DISCONTINUED | OUTPATIENT
Start: 2018-01-02 | End: 2018-01-02

## 2018-01-02 RX ORDER — HYDROMORPHONE HYDROCHLORIDE 1 MG/ML
0.2 INJECTION, SOLUTION INTRAMUSCULAR; INTRAVENOUS; SUBCUTANEOUS EVERY 5 MIN PRN
Status: DISCONTINUED | OUTPATIENT
Start: 2018-01-02 | End: 2018-01-02 | Stop reason: HOSPADM

## 2018-01-02 RX ORDER — ONDANSETRON 2 MG/ML
INJECTION INTRAMUSCULAR; INTRAVENOUS
Status: DISCONTINUED | OUTPATIENT
Start: 2018-01-02 | End: 2018-01-02

## 2018-01-02 RX ORDER — FENTANYL CITRATE 50 UG/ML
INJECTION, SOLUTION INTRAMUSCULAR; INTRAVENOUS
Status: DISCONTINUED | OUTPATIENT
Start: 2018-01-02 | End: 2018-01-02

## 2018-01-02 RX ORDER — ROCURONIUM BROMIDE 10 MG/ML
INJECTION, SOLUTION INTRAVENOUS
Status: DISCONTINUED | OUTPATIENT
Start: 2018-01-02 | End: 2018-01-02

## 2018-01-02 RX ORDER — MEPERIDINE HYDROCHLORIDE 50 MG/ML
12.5 INJECTION INTRAMUSCULAR; INTRAVENOUS; SUBCUTANEOUS ONCE AS NEEDED
Status: COMPLETED | OUTPATIENT
Start: 2018-01-02 | End: 2018-01-02

## 2018-01-02 RX ORDER — SODIUM CHLORIDE 0.9 % (FLUSH) 0.9 %
3 SYRINGE (ML) INJECTION EVERY 8 HOURS
Status: DISCONTINUED | OUTPATIENT
Start: 2018-01-02 | End: 2018-01-02 | Stop reason: HOSPADM

## 2018-01-02 RX ORDER — SODIUM CHLORIDE 0.9 % (FLUSH) 0.9 %
3 SYRINGE (ML) INJECTION
Status: DISCONTINUED | OUTPATIENT
Start: 2018-01-02 | End: 2018-01-02 | Stop reason: HOSPADM

## 2018-01-02 RX ORDER — SODIUM CHLORIDE, SODIUM LACTATE, POTASSIUM CHLORIDE, CALCIUM CHLORIDE 600; 310; 30; 20 MG/100ML; MG/100ML; MG/100ML; MG/100ML
INJECTION, SOLUTION INTRAVENOUS CONTINUOUS
Status: DISCONTINUED | OUTPATIENT
Start: 2018-01-02 | End: 2018-01-02 | Stop reason: HOSPADM

## 2018-01-02 RX ORDER — KETOROLAC TROMETHAMINE 30 MG/ML
INJECTION, SOLUTION INTRAMUSCULAR; INTRAVENOUS
Status: DISCONTINUED | OUTPATIENT
Start: 2018-01-02 | End: 2018-01-02

## 2018-01-02 RX ORDER — HYDROCODONE BITARTRATE AND ACETAMINOPHEN 5; 325 MG/1; MG/1
1 TABLET ORAL EVERY 4 HOURS PRN
Qty: 15 TABLET | Refills: 0 | Status: SHIPPED | OUTPATIENT
Start: 2018-01-02 | End: 2018-04-13

## 2018-01-02 RX ORDER — MIDAZOLAM HYDROCHLORIDE 1 MG/ML
INJECTION, SOLUTION INTRAMUSCULAR; INTRAVENOUS
Status: DISCONTINUED | OUTPATIENT
Start: 2018-01-02 | End: 2018-01-02

## 2018-01-02 RX ADMIN — FENTANYL CITRATE 100 MCG: 50 INJECTION, SOLUTION INTRAMUSCULAR; INTRAVENOUS at 01:01

## 2018-01-02 RX ADMIN — PROPOFOL 200 MG: 10 INJECTION, EMULSION INTRAVENOUS at 02:01

## 2018-01-02 RX ADMIN — ACETAMINOPHEN 1000 MG: 10 INJECTION, SOLUTION INTRAVENOUS at 03:01

## 2018-01-02 RX ADMIN — MEPERIDINE HYDROCHLORIDE 12.5 MG: 50 INJECTION INTRAMUSCULAR; INTRAVENOUS; SUBCUTANEOUS at 03:01

## 2018-01-02 RX ADMIN — Medication 0.2 MG: at 03:01

## 2018-01-02 RX ADMIN — KETOROLAC TROMETHAMINE 30 MG: 30 INJECTION, SOLUTION INTRAMUSCULAR; INTRAVENOUS at 02:01

## 2018-01-02 RX ADMIN — ONDANSETRON 4 MG: 2 INJECTION, SOLUTION INTRAMUSCULAR; INTRAVENOUS at 04:01

## 2018-01-02 RX ADMIN — SODIUM CHLORIDE, SODIUM LACTATE, POTASSIUM CHLORIDE, AND CALCIUM CHLORIDE: 600; 310; 30; 20 INJECTION, SOLUTION INTRAVENOUS at 01:01

## 2018-01-02 RX ADMIN — LIDOCAINE HYDROCHLORIDE 80 MG: 20 INJECTION, SOLUTION INTRAVENOUS at 02:01

## 2018-01-02 RX ADMIN — ONDANSETRON 4 MG: 2 INJECTION, SOLUTION INTRAMUSCULAR; INTRAVENOUS at 02:01

## 2018-01-02 RX ADMIN — ROCURONIUM BROMIDE 20 MG: 10 INJECTION, SOLUTION INTRAVENOUS at 02:01

## 2018-01-02 RX ADMIN — MIDAZOLAM HYDROCHLORIDE 2 MG: 1 INJECTION, SOLUTION INTRAMUSCULAR; INTRAVENOUS at 01:01

## 2018-01-02 RX ADMIN — CEFAZOLIN 2 G: 1 INJECTION, POWDER, FOR SOLUTION INTRAMUSCULAR; INTRAVENOUS at 02:01

## 2018-01-02 NOTE — HPI
27 y.o. female  presents for hysteroscopic resection of uterine septum.  She has had two first trimester miscarriages in 2017.  Ultrasound shows partial septate uterus.

## 2018-01-02 NOTE — DISCHARGE SUMMARY
[facility] -   Short Stay  Discharge Summary    Admit Date: 1/2/2018    Discharge Date: 1/2/2018     Discharge Attending Physician: Darlene Emerson MD    Procedures:  D&C, hysteroscopic resection of uterine septum, laparoscopy    Final Diagnoses:    Principal Problem: Septate uterus       Discharged Condition:  Stable    Disposition: Home    Follow up/Patient Instructions:    Medications:   Tawanna Harp   Home Medication Instructions MARITZA:[id number]    Printed on:01/02/18 6414   Medication Information                      hydrocodone-acetaminophen 5-325mg (NORCO) 5-325 mg per tablet  Take 1 tablet by mouth every 4 (four) hours as needed for Pain.             PRENATAL VIT CALC,IRON,FOLIC (PRENATAL VITAMIN ORAL)  Take by mouth once daily.                 Discharge Procedure Orders  Call MD for:  temperature >100.4     Call MD for:  persistent nausea and vomiting or diarrhea     Call MD for:  severe uncontrolled pain     Call MD for:  redness, tenderness, or signs of infection (pain, swelling, redness, odor or green/yellow discharge around incision site)     Call MD for:  difficulty breathing or increased cough     Call MD for:  persistent dizziness, light-headedness, or visual disturbances     No dressing needed       Follow-up Information     Darlene Emerson MD In 4 weeks.    Specialties:  Obstetrics, Obstetrics and Gynecology  Why:  Postop check  Contact information:  [address] [place] [address] [place] [address] [phone]

## 2018-01-02 NOTE — TELEPHONE ENCOUNTER
----- Message from Roxy Duenas sent at 1/2/2018  4:10 PM CST -----  Contact: [phone]  Would like to consult with nurse about post op appt for next week; please call pt lanette at [phone] thx lj

## 2018-01-02 NOTE — SUBJECTIVE & OBJECTIVE
Obstetric History       T0      L0     SAB2   TAB0   Ectopic0   Multiple0   Live Births0       # Outcome Date GA Lbr Jarad/2nd Weight Sex Delivery Anes PTL Lv   2 SAB            1 SAB                 Past Medical History:   Diagnosis Date    Arthritis     neck     No past surgical history on file.    No prescriptions prior to admission.       Review of patient's allergies indicates:  No Known Allergies     Family History     Problem Relation (Age of Onset)    Cancer Sister    Diabetes Paternal Grandmother        Social History Main Topics    Smoking status: Never Smoker    Smokeless tobacco: Never Used    Alcohol use No      Comment: cooks with it    Drug use: No    Sexual activity: Yes     Partners: Male     Review of Systems   Constitutional: Negative for chills and fever.   Respiratory: Negative for cough and shortness of breath.    Cardiovascular: Negative for chest pain.   Gastrointestinal: Negative for abdominal pain, nausea and vomiting.   Genitourinary: Negative for dysuria, menorrhagia, pelvic pain and vaginal bleeding.      Objective:     Vital Signs (Most Recent):    Vital Signs (24h Range):           There is no height or weight on file to calculate BMI.    No LMP recorded.    Physical Exam:   Constitutional: She is oriented to person, place, and time. She appears well-developed and well-nourished. No distress.    HENT:   Head: Normocephalic and atraumatic.     Neck: Neck supple.    Cardiovascular: Normal rate and regular rhythm.     Pulmonary/Chest: Effort normal.        Abdominal: Soft. She exhibits no distension. There is no tenderness.             Musculoskeletal: She exhibits no edema or tenderness.       Neurological: She is alert and oriented to person, place, and time.    Skin: Skin is warm and dry.    Psychiatric: She has a normal mood and affect.       Laboratory:  I have personallly reviewed all pertinent lab results from the last 24 hours.    Diagnostic Results:  US:  Reviewed  Partial septate uterus

## 2018-01-02 NOTE — TRANSFER OF CARE
"Anesthesia Transfer of Care Note    Patient: Tawanna Harp    Procedure(s) Performed: Procedure(s) (LRB):  [id number] MYOSURE (N/A)  LAPAROSCOPY WITH CAUTERIZATION OF UTERUS (N/A)    Patient location: PACU    Anesthesia Type: general    Transport from OR: Transported from OR on room air with adequate spontaneous ventilation    Post pain: adequate analgesia    Post assessment: no apparent anesthetic complications    Post vital signs: stable    Level of consciousness: awake, alert and oriented    Nausea/Vomiting: no nausea/vomiting    Complications: none    Transfer of care protocol was followed      Last vitals:   Visit Vitals  /71 (BP Location: Right arm, Patient Position: Sitting)   Pulse 71   Temp 37 °C (98.6 °F) (Temporal)   Resp 16   Ht 5' 9" (1.753 m)   Wt 67.9 kg (149 lb 12.8 oz)   LMP 12/26/2017 (Within Days)   SpO2 99%   Breastfeeding? No   BMI 22.12 kg/m²     "

## 2018-01-02 NOTE — H&P
[facility] -   Obstetrics & Gynecology  History & Physical    Patient Name: Tawanna Harp  MRN: [id number]  Admission Date: (Not on file)  Primary Care Provider: Primary Doctor No    Subjective:     Chief Complaint/Reason for Admission:  Resection of uterine septum    History of Present Illness:  27 y.o. female  presents for hysteroscopic resection of uterine septum.  She has had two first trimester miscarriages in 2017.  Ultrasound shows partial septate uterus.          Obstetric History       T0      L0     SAB2   TAB0   Ectopic0   Multiple0   Live Births0       # Outcome Date GA Lbr Jarad/2nd Weight Sex Delivery Anes PTL Lv   2 SAB            1 SAB                 Past Medical History:   Diagnosis Date    Arthritis     neck     No past surgical history on file.    No prescriptions prior to admission.       Review of patient's allergies indicates:  No Known Allergies     Family History     Problem Relation (Age of Onset)    Cancer Sister    Diabetes Paternal Grandmother        Social History Main Topics    Smoking status: Never Smoker    Smokeless tobacco: Never Used    Alcohol use No      Comment: cooks with it    Drug use: No    Sexual activity: Yes     Partners: Male     Review of Systems   Constitutional: Negative for chills and fever.   Respiratory: Negative for cough and shortness of breath.    Cardiovascular: Negative for chest pain.   Gastrointestinal: Negative for abdominal pain, nausea and vomiting.   Genitourinary: Negative for dysuria, menorrhagia, pelvic pain and vaginal bleeding.      Objective:     Vital Signs (Most Recent):    Vital Signs (24h Range):           There is no height or weight on file to calculate BMI.    No LMP recorded.    Physical Exam:   Constitutional: She is oriented to person, place, and time. She appears well-developed and well-nourished. No distress.    HENT:   Head: Normocephalic and atraumatic.     Neck: Neck supple.     Cardiovascular: Normal rate and regular rhythm.     Pulmonary/Chest: Effort normal.        Abdominal: Soft. She exhibits no distension. There is no tenderness.             Musculoskeletal: She exhibits no edema or tenderness.       Neurological: She is alert and oriented to person, place, and time.    Skin: Skin is warm and dry.    Psychiatric: She has a normal mood and affect.       Laboratory:  I have personallly reviewed all pertinent lab results from the last 24 hours.    Diagnostic Results:  US: Reviewed  Partial septate uterus    Assessment/Plan:     * Septate uterus    Proceed with Hysteroscopy for resection of uterine septum.            Darlene Emerson MD  Obstetrics & Gynecology  [facility] -

## 2018-01-02 NOTE — DISCHARGE INSTRUCTIONS
General Information:  1.  Do not drink alcoholic beverages including beer for 24 hours or as long as you are on pain medication..  2.  Do not drive a motor vehicle, operate machinery or power tools, or signs legal papers for 24 hours or as long as you are on pain medication.   3.  You may experience light-headedness, dizziness, and sleepiness following surgery. Please do not stay alone. A responsible adult should be with you for this 24 hour period.  4.  Go home and rest.  5. Progress slowly to a normal diet unless instructed.  Otherwise, begin with liquids such as soft drinks, then soup and crackers working up to solid foods. Drink plenty of nonalcoholic fluids.  6.  Certain anesthetics and pain medications produce nausea and vomiting in certain       individuals. If nausea becomes a problem at home, call you doctor.  7. A nurse will be calling you sometime after surgery. Do not be alarmed. This is our way of finding out how you are doing.  8. Several times every hour while you are awake, take 2-3 deep breaths and cough. If you had stomach surgery hold a pillow or rolled towel firmly against your stomach before you cough. This will help with any pain the cough might cause.  9. Several times every hour while you are awake, pump and flex your feet 5-6 times and do foot circles. This will help prevent blood clots.  10.Call your doctor for severe pain, bleeding, fever, or signs or symptoms of infection (pain, swelling, redness, foul odor, drainage).  11.You can contact your doctor anytime by [phone] for the [facility] (at [facility]) or [phone] for the [facility] on [facility] [address].   [email or web address] is another way to contact your doctor if you are an active participant online with [facility].

## 2018-01-02 NOTE — ANESTHESIA PREPROCEDURE EVALUATION
01/02/2018  Tawanna Harp is a 27 y.o., female.    Pre-op Assessment    I have reviewed the Patient Summary Reports.    I have reviewed the Nursing Notes.      Review of Systems  Social:  Non-Smoker, No Alcohol Use    Hematology/Oncology:  Hematology Normal   Oncology Normal     EENT/Dental:EENT/Dental Normal   Cardiovascular:  Cardiovascular Normal     Pulmonary:  Pulmonary Normal    Renal/:  Renal/ Normal     Hepatic/GI:  Hepatic/GI Normal    Musculoskeletal:  Musculoskeletal Normal    OB/GYN/PEDS:  Septate uterus   Neurological:  Neurology Normal    Endocrine:  Endocrine Normal    Dermatological:  Skin Normal    Psych:  Psychiatric Normal           Physical Exam  General:  Well nourished       Chest/Lungs:  Chest/Lungs Findings: Normal Respiratory Rate     Heart/Vascular:  Heart Findings: Rate: Normal        Mental Status:  Mental Status Findings:  Cooperative, Alert and Oriented         Anesthesia Plan  Type of Anesthesia, risks & benefits discussed:  Anesthesia Type:  general  Patient's Preference:   Intra-op Monitoring Plan: standard ASA monitors  Intra-op Monitoring Plan Comments:   Post Op Pain Control Plan: IV/PO Opioids PRN  Post Op Pain Control Plan Comments:   Induction:   IV  Beta Blocker:  Patient is not currently on a Beta-Blocker (No further documentation required).       Informed Consent: Patient understands risks and agrees with Anesthesia plan.  Questions answered. Anesthesia consent signed with patient.  ASA Score: 2     Day of Surgery Review of History & Physical: I have interviewed and examined the patient. I have reviewed the patient's H&P dated:

## 2018-01-02 NOTE — INTERVAL H&P NOTE
The patient has been examined and the H&P has been reviewed:    I concur with the findings and no changes have occurred since H&P was written.    Anesthesia/Surgery risks, benefits and alternative options discussed and understood by patient/family.  Surgery consent reviewed with patient and signed by her.          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

## 2018-01-02 NOTE — PLAN OF CARE
POC and discharged instructions discussed with pt and spouse. Verbalized understanding. Handouts given. VSS.

## 2018-01-02 NOTE — OP NOTE
OPERATIVE NOTE      PREOPERATIVE DIAGNOSIS:  Septate uterus    POSTOPERATIVE DIAGNOSIS:  Septate uterus    OPERATIVE PROCEDURE:    1.  Dilation and Curettage  2.  Hysteroscopy  3.  Resection of uterine septum  4.  Diagnostic laparoscopy with cauterization of uterine perforation    SURGEON:  Darlene Emerson MD    ASSISTANT:  Roseanne Viera CST    ANESTHESIA:  General    ESTIMATED BLOOD LOSS:  100 ml    FINDINGS:  Thick uterine septum, otherwise normal appearing endometrial cavity, normal uterus, with fundal perforation noted, normal tubes and ovaries.    COMPLICATIONS:  Uterine perforation    PROCEDURE IN DETAIL:    The procedure was explained to the patient and informed consent was obtained.  The patient was brought to the operating room where general anesthesia was administered.  An in and out catheterization was done, and she was positioned in the dorsal lithotomy position and  prepped and draped in the usual sterile manner.  A time out was performed.      A weighted speculum was placed in the vagina and the anterior lip of the cervix was grasped with a single tooth tenaculum.  The uterus was sounded to 8 cm.  The cervix was dilated to a number 8 Hegar dilator without difficulty.  A 7 mm diagnostic hysteroscope was then gently advanced to the uterine fundus with normal saline used as the distending medium.  The entire endometrial cavity was well visualized with a thick uterine septum noted.  A Myosure reach was used to resect the uterine septum.  At the conclusion of the resection, a uterine perforation occurred from the Myosure.  This was immediately recognized, and the hysteroscope was then removed.  A sharp curettage was then performed with a small amount of tissue removed.  The endometrial cavity felt normal with endometrial curette. The tissue removed was sent to pathology.      At this time, a diagnostic laparoscopy was performed to assess bleeding from the uterine perforation.   A ray catheter was placed  as well as a ZOmniStrat uterine manipulator, and she was prepped and draped in the usual sterile manner.    While tenting up on the abdomen with towel clips, a Veress needle was placed through the umbilicus into the peritoneal cavity.  A saline drop test was noted to be normal.  The abdomen was insufflated with carbon dioxide to reach a pressure of 15 mm of mercury.  A small intraumbilical incision was made with the scalpel and a 5 mm trocar and sleeve were placed without difficulty.  Laparoscopic confirmation of location was achieved.  The upper abdomen was visualized and appeared normal.  The patient was placed in the deep trendelenberg position.  The pelvis was visualized.  The uterus, tubes, and ovaries were carefully inspected and appeared normal except that a small fundal uterine perforation was noted. Slight bleeding was noted, and the area was cauterized with a monopolar hook.  Surgicel was also placed over the area.  Excellent hemostasis was noted.  Bowel and omentum were inspected with no evidence of any abnormality.    At this time the procedure was concluded.  All instruments were removed without difficulty.  The patient tolerated the procedure well and was awakened from anesthesia and brought to the recovery room in stable condition.  All counts were correct x 3.

## 2018-01-03 NOTE — ANESTHESIA POSTPROCEDURE EVALUATION
"Anesthesia Post Evaluation    Patient: Tawanna Harp    Procedure(s) Performed: Procedure(s) (LRB):  [id number] MYOSURE (N/A)  LAPAROSCOPY WITH CAUTERIZATION OF UTERUS (N/A)    Final Anesthesia Type: general  Patient location during evaluation: PACU  Patient participation: Yes- Able to Participate  Level of consciousness: awake and alert  Post-procedure vital signs: reviewed and stable  Pain management: adequate  Airway patency: patent  PONV status at discharge: No PONV  Anesthetic complications: no      Cardiovascular status: blood pressure returned to baseline  Respiratory status: unassisted  Hydration status: euvolemic  Follow-up not needed.        Visit Vitals  /70   Pulse 90   Temp 36.7 °C (98 °F)   Resp 18   Ht 5' 9" (1.753 m)   Wt 67.9 kg (149 lb 12.8 oz)   LMP 12/26/2017 (Within Days)   SpO2 97%   Breastfeeding? No   BMI 22.12 kg/m²       Pain/Lise Score: Pain Assessment Performed: Yes (1/2/2018  3:20 PM)  Presence of Pain: complains of pain/discomfort (1/2/2018  3:27 PM)  Pain Rating Prior to Med Admin: 5 (1/2/2018  3:55 PM)  Pain Rating Post Med Admin: 2 (1/2/2018  4:15 PM)  Lise Score: 10 (1/2/2018  4:15 PM)      "

## 2018-01-19 ENCOUNTER — OFFICE VISIT (OUTPATIENT)
Dept: OBSTETRICS AND GYNECOLOGY | Facility: CLINIC | Age: 28
End: 2018-01-19

## 2018-01-19 VITALS
BODY MASS INDEX: 22.81 KG/M2 | HEIGHT: 69 IN | SYSTOLIC BLOOD PRESSURE: 100 MMHG | DIASTOLIC BLOOD PRESSURE: 72 MMHG | WEIGHT: 154 LBS

## 2018-01-19 DIAGNOSIS — Q51.28 SEPTATE UTERUS: ICD-10-CM

## 2018-01-19 DIAGNOSIS — Z98.890 POST-OPERATIVE STATE: Primary | ICD-10-CM

## 2018-01-19 PROCEDURE — 99024 POSTOP FOLLOW-UP VISIT: CPT | Mod: ,,, | Performed by: OBSTETRICS & GYNECOLOGY

## 2018-01-19 PROCEDURE — 99999 PR PBB SHADOW E&M-EST. PATIENT-LVL II: CPT | Mod: PBBFAC,,, | Performed by: OBSTETRICS & GYNECOLOGY

## 2018-01-19 PROCEDURE — 99212 OFFICE O/P EST SF 10 MIN: CPT | Mod: PBBFAC | Performed by: OBSTETRICS & GYNECOLOGY

## 2018-01-20 NOTE — PROGRESS NOTES
Patient presents today for postoperative follow up.  Pt. underwent hysteroscopy with Myosure resection of uterine septum, as well as diagnostic laparoscopy due to uterine perforation on 1/2/2018.  Patient is recovering well and has no complaints today.    Pathology report reviewed.    Physical Exam:  General - no acute distress  Abdomen - soft, nontender and nondistended.  No masses.  Incisions are well healed with no evidence of infection.  Extremities - nontender and no edema noted.    Encounter Diagnoses   Name Primary?    Post-operative state Yes    Septate uterus        PLAN:  Follow up as needed.

## 2018-01-23 VITALS
RESPIRATION RATE: 18 BRPM | BODY MASS INDEX: 22.19 KG/M2 | WEIGHT: 149.81 LBS | DIASTOLIC BLOOD PRESSURE: 70 MMHG | HEIGHT: 69 IN | OXYGEN SATURATION: 97 % | SYSTOLIC BLOOD PRESSURE: 121 MMHG | TEMPERATURE: 98 F | HEART RATE: 90 BPM

## 2018-03-04 ENCOUNTER — PATIENT MESSAGE (OUTPATIENT)
Dept: OBSTETRICS AND GYNECOLOGY | Facility: CLINIC | Age: 28
End: 2018-03-04

## 2018-03-04 DIAGNOSIS — O26.20 RECURRENT PREGNANCY LOSS WITH CURRENT PREGNANCY: Primary | ICD-10-CM

## 2018-03-04 DIAGNOSIS — O20.0 THREATENED MISCARRIAGE IN EARLY PREGNANCY: ICD-10-CM

## 2018-03-05 ENCOUNTER — TELEPHONE (OUTPATIENT)
Dept: OBSTETRICS AND GYNECOLOGY | Facility: CLINIC | Age: 28
End: 2018-03-05

## 2018-03-05 ENCOUNTER — PROCEDURE VISIT (OUTPATIENT)
Dept: OBSTETRICS AND GYNECOLOGY | Facility: CLINIC | Age: 28
End: 2018-03-05

## 2018-03-05 ENCOUNTER — LAB VISIT (OUTPATIENT)
Dept: LAB | Facility: HOSPITAL | Age: 28
End: 2018-03-05
Attending: OBSTETRICS & GYNECOLOGY

## 2018-03-05 DIAGNOSIS — O20.0 THREATENED MISCARRIAGE IN EARLY PREGNANCY: ICD-10-CM

## 2018-03-05 DIAGNOSIS — O20.0 THREATENED ABORTION: Primary | ICD-10-CM

## 2018-03-05 DIAGNOSIS — O26.20 RECURRENT PREGNANCY LOSS WITH CURRENT PREGNANCY: ICD-10-CM

## 2018-03-05 DIAGNOSIS — O20.0 THREATENED MISCARRIAGE IN EARLY PREGNANCY: Primary | ICD-10-CM

## 2018-03-05 DIAGNOSIS — O20.0 THREATENED ABORTION: ICD-10-CM

## 2018-03-05 LAB
BASOPHILS # BLD AUTO: 0.04 K/UL
BASOPHILS NFR BLD: 0.4 %
DIFFERENTIAL METHOD: ABNORMAL
EOSINOPHIL # BLD AUTO: 0.2 K/UL
EOSINOPHIL NFR BLD: 2 %
ERYTHROCYTE [DISTWIDTH] IN BLOOD BY AUTOMATED COUNT: 12.7 %
HCG INTACT+B SERPL-ACNC: NORMAL MIU/ML
HCT VFR BLD AUTO: 39.1 %
HGB BLD-MCNC: 13.7 G/DL
LYMPHOCYTES # BLD AUTO: 2.4 K/UL
LYMPHOCYTES NFR BLD: 20.9 %
MCH RBC QN AUTO: 30.2 PG
MCHC RBC AUTO-ENTMCNC: 35 G/DL
MCV RBC AUTO: 86 FL
MONOCYTES # BLD AUTO: 0.8 K/UL
MONOCYTES NFR BLD: 6.8 %
NEUTROPHILS # BLD AUTO: 7.9 K/UL
NEUTROPHILS NFR BLD: 69.9 %
PLATELET # BLD AUTO: 307 K/UL
PMV BLD AUTO: 10.2 FL
PROGEST SERPL-MCNC: 24.4 NG/ML
RBC # BLD AUTO: 4.54 M/UL
T3FREE SERPL-MCNC: 2.6 PG/ML
T4 FREE SERPL-MCNC: 1.37 NG/DL
THYROGLOB AB SERPL IA-ACNC: <4 IU/ML
THYROPEROXIDASE IGG SERPL-ACNC: <6 IU/ML
TSH SERPL DL<=0.005 MIU/L-ACNC: 1.15 UIU/ML
WBC # BLD AUTO: 11.28 K/UL

## 2018-03-05 PROCEDURE — 84702 CHORIONIC GONADOTROPIN TEST: CPT

## 2018-03-05 PROCEDURE — 86376 MICROSOMAL ANTIBODY EACH: CPT

## 2018-03-05 PROCEDURE — 84144 ASSAY OF PROGESTERONE: CPT

## 2018-03-05 PROCEDURE — 36415 COLL VENOUS BLD VENIPUNCTURE: CPT

## 2018-03-05 PROCEDURE — 84482 T3 REVERSE: CPT

## 2018-03-05 PROCEDURE — 76801 OB US < 14 WKS SINGLE FETUS: CPT | Mod: PBBFAC | Performed by: OBSTETRICS & GYNECOLOGY

## 2018-03-05 PROCEDURE — 84481 FREE ASSAY (FT-3): CPT

## 2018-03-05 PROCEDURE — 84439 ASSAY OF FREE THYROXINE: CPT

## 2018-03-05 PROCEDURE — 76801 OB US < 14 WKS SINGLE FETUS: CPT | Mod: 26,S$PBB,, | Performed by: OBSTETRICS & GYNECOLOGY

## 2018-03-05 PROCEDURE — 85025 COMPLETE CBC W/AUTO DIFF WBC: CPT

## 2018-03-05 PROCEDURE — 86800 THYROGLOBULIN ANTIBODY: CPT

## 2018-03-05 PROCEDURE — 84443 ASSAY THYROID STIM HORMONE: CPT

## 2018-03-05 NOTE — TELEPHONE ENCOUNTER
Patient calling with some cramping and would like to have a sonogram her lmp  Is 01/19/2018 , she is a lot coming for some labs today as well, ? Tf

## 2018-03-06 NOTE — TELEPHONE ENCOUNTER
Attempt to call pt regarding her u/s appt per Dr Thompson on tmw at 830 we will try calling again we wasn't able to reach her .

## 2018-03-06 NOTE — TELEPHONE ENCOUNTER
pls link pt u/s in our dept for 8am at [facility] tomorrow  And make sure she does her hcg before the u/s    Tell her I tried to call her. I spoke irineo Dickson and she agrees with this follow up. I will keep her updated as she is out of clinic this wk.

## 2018-03-07 ENCOUNTER — LAB VISIT (OUTPATIENT)
Dept: LAB | Facility: HOSPITAL | Age: 28
End: 2018-03-07
Attending: OBSTETRICS & GYNECOLOGY

## 2018-03-07 ENCOUNTER — PROCEDURE VISIT (OUTPATIENT)
Dept: OBSTETRICS AND GYNECOLOGY | Facility: CLINIC | Age: 28
End: 2018-03-07

## 2018-03-07 DIAGNOSIS — O20.0 THREATENED ABORTION: ICD-10-CM

## 2018-03-07 LAB — HCG INTACT+B SERPL-ACNC: NORMAL MIU/ML

## 2018-03-07 PROCEDURE — 76801 OB US < 14 WKS SINGLE FETUS: CPT | Mod: PBBFAC | Performed by: OBSTETRICS & GYNECOLOGY

## 2018-03-07 PROCEDURE — 36415 COLL VENOUS BLD VENIPUNCTURE: CPT

## 2018-03-07 PROCEDURE — 76801 OB US < 14 WKS SINGLE FETUS: CPT | Mod: 26,S$PBB,, | Performed by: OBSTETRICS & GYNECOLOGY

## 2018-03-07 PROCEDURE — 84702 CHORIONIC GONADOTROPIN TEST: CPT

## 2018-03-09 PROBLEM — O26.20 RECURRENT PREGNANCY LOSS WITH CURRENT PREGNANCY: Status: ACTIVE | Noted: 2017-12-16

## 2018-03-09 PROBLEM — Z34.90 SUPERVISION OF NORMAL PREGNANCY: Status: ACTIVE | Noted: 2018-03-09

## 2018-03-12 LAB — T3REVERSE SERPL-MCNC: 25 NG/DL (ref 9–27)

## 2018-03-13 ENCOUNTER — TELEPHONE (OUTPATIENT)
Dept: OBSTETRICS AND GYNECOLOGY | Facility: HOSPITAL | Age: 28
End: 2018-03-13

## 2018-03-13 NOTE — TELEPHONE ENCOUNTER
Patient has had bleeding and cramping with this pregnancy, due to subchorionic hemorrhage.  She is scheduled for an OB ultrasound at [facility] on Friday (3/16) but doesn't have a provider appt.  Can you see if she would be able to see me at 3:30 pm after her ultrasound and add her to my schedule if that works for her.

## 2018-03-14 ENCOUNTER — PATIENT MESSAGE (OUTPATIENT)
Dept: OBSTETRICS AND GYNECOLOGY | Facility: CLINIC | Age: 28
End: 2018-03-14

## 2018-04-13 ENCOUNTER — LAB VISIT (OUTPATIENT)
Dept: LAB | Facility: HOSPITAL | Age: 28
End: 2018-04-13
Attending: OBSTETRICS & GYNECOLOGY

## 2018-04-13 ENCOUNTER — INITIAL PRENATAL (OUTPATIENT)
Dept: OBSTETRICS AND GYNECOLOGY | Facility: CLINIC | Age: 28
End: 2018-04-13

## 2018-04-13 VITALS
BODY MASS INDEX: 22.11 KG/M2 | SYSTOLIC BLOOD PRESSURE: 110 MMHG | WEIGHT: 149.69 LBS | DIASTOLIC BLOOD PRESSURE: 68 MMHG

## 2018-04-13 DIAGNOSIS — Z34.81 ENCOUNTER FOR SUPERVISION OF OTHER NORMAL PREGNANCY IN FIRST TRIMESTER: Primary | ICD-10-CM

## 2018-04-13 DIAGNOSIS — O41.8X10 SUBCHORIONIC HEMATOMA IN FIRST TRIMESTER, SINGLE OR UNSPECIFIED FETUS: ICD-10-CM

## 2018-04-13 DIAGNOSIS — O46.8X1 SUBCHORIONIC HEMATOMA IN FIRST TRIMESTER, SINGLE OR UNSPECIFIED FETUS: ICD-10-CM

## 2018-04-13 DIAGNOSIS — O26.20 RECURRENT PREGNANCY LOSS WITH CURRENT PREGNANCY: ICD-10-CM

## 2018-04-13 DIAGNOSIS — Z34.81 ENCOUNTER FOR SUPERVISION OF OTHER NORMAL PREGNANCY IN FIRST TRIMESTER: ICD-10-CM

## 2018-04-13 LAB
ABO + RH BLD: NORMAL
BASOPHILS # BLD AUTO: 0.04 K/UL
BASOPHILS NFR BLD: 0.4 %
BLD GP AB SCN CELLS X3 SERPL QL: NORMAL
DIFFERENTIAL METHOD: ABNORMAL
EOSINOPHIL # BLD AUTO: 0.2 K/UL
EOSINOPHIL NFR BLD: 2.1 %
ERYTHROCYTE [DISTWIDTH] IN BLOOD BY AUTOMATED COUNT: 12 %
HCT VFR BLD AUTO: 35.4 %
HGB BLD-MCNC: 11.9 G/DL
IMM GRANULOCYTES # BLD AUTO: 0.03 K/UL
IMM GRANULOCYTES NFR BLD AUTO: 0.3 %
LYMPHOCYTES # BLD AUTO: 2.3 K/UL
LYMPHOCYTES NFR BLD: 21.6 %
MCH RBC QN AUTO: 29.5 PG
MCHC RBC AUTO-ENTMCNC: 33.6 G/DL
MCV RBC AUTO: 88 FL
MONOCYTES # BLD AUTO: 0.6 K/UL
MONOCYTES NFR BLD: 6.1 %
NEUTROPHILS # BLD AUTO: 7.3 K/UL
NEUTROPHILS NFR BLD: 69.5 %
NRBC BLD-RTO: 0 /100 WBC
PLATELET # BLD AUTO: 243 K/UL
PMV BLD AUTO: 10.6 FL
RBC # BLD AUTO: 4.03 M/UL
WBC # BLD AUTO: 10.45 K/UL

## 2018-04-13 PROCEDURE — 86703 HIV-1/HIV-2 1 RESULT ANTBDY: CPT

## 2018-04-13 PROCEDURE — 87491 CHLMYD TRACH DNA AMP PROBE: CPT

## 2018-04-13 PROCEDURE — 86762 RUBELLA ANTIBODY: CPT

## 2018-04-13 PROCEDURE — 86592 SYPHILIS TEST NON-TREP QUAL: CPT

## 2018-04-13 PROCEDURE — 87340 HEPATITIS B SURFACE AG IA: CPT

## 2018-04-13 PROCEDURE — 99999 PR PBB SHADOW E&M-EST. PATIENT-LVL II: CPT | Mod: PBBFAC,,, | Performed by: OBSTETRICS & GYNECOLOGY

## 2018-04-13 PROCEDURE — 85025 COMPLETE CBC W/AUTO DIFF WBC: CPT

## 2018-04-13 PROCEDURE — 0502F SUBSEQUENT PRENATAL CARE: CPT | Mod: ,,, | Performed by: OBSTETRICS & GYNECOLOGY

## 2018-04-13 PROCEDURE — 99212 OFFICE O/P EST SF 10 MIN: CPT | Mod: PBBFAC,25 | Performed by: OBSTETRICS & GYNECOLOGY

## 2018-04-13 PROCEDURE — 86901 BLOOD TYPING SEROLOGIC RH(D): CPT

## 2018-04-14 LAB
C TRACH DNA SPEC QL NAA+PROBE: NOT DETECTED
N GONORRHOEA DNA SPEC QL NAA+PROBE: NOT DETECTED
RPR SER QL: NORMAL

## 2018-04-16 LAB
HBV SURFACE AG SERPL QL IA: NEGATIVE
HIV 1+2 AB+HIV1 P24 AG SERPL QL IA: NEGATIVE
RUBV IGG SER-ACNC: 8.4 IU/ML
RUBV IGG SER-IMP: ABNORMAL

## 2018-04-18 NOTE — PROGRESS NOTES
No complaints.  Bleeding from subchorionic hematoma has resolved.  Positive FHT's by doppler today.  Patient declines quad screen or sequential screen.  Plan:  RTC 4 weeks with u/s for follow up.

## 2018-05-16 ENCOUNTER — ROUTINE PRENATAL (OUTPATIENT)
Dept: OBSTETRICS AND GYNECOLOGY | Facility: CLINIC | Age: 28
End: 2018-05-16

## 2018-05-16 VITALS
WEIGHT: 151.88 LBS | BODY MASS INDEX: 22.43 KG/M2 | DIASTOLIC BLOOD PRESSURE: 64 MMHG | SYSTOLIC BLOOD PRESSURE: 122 MMHG

## 2018-05-16 DIAGNOSIS — O41.8X10 SUBCHORIONIC HEMATOMA IN FIRST TRIMESTER, SINGLE OR UNSPECIFIED FETUS: ICD-10-CM

## 2018-05-16 DIAGNOSIS — Z36.89 ENCOUNTER FOR FETAL ANATOMIC SURVEY: ICD-10-CM

## 2018-05-16 DIAGNOSIS — Z34.82 ENCOUNTER FOR SUPERVISION OF OTHER NORMAL PREGNANCY IN SECOND TRIMESTER: Primary | ICD-10-CM

## 2018-05-16 DIAGNOSIS — O46.8X1 SUBCHORIONIC HEMATOMA IN FIRST TRIMESTER, SINGLE OR UNSPECIFIED FETUS: ICD-10-CM

## 2018-05-16 DIAGNOSIS — Q51.28 SEPTATE UTERUS: ICD-10-CM

## 2018-05-16 PROCEDURE — 99212 OFFICE O/P EST SF 10 MIN: CPT | Mod: PBBFAC | Performed by: ADVANCED PRACTICE MIDWIFE

## 2018-05-16 PROCEDURE — 99999 PR PBB SHADOW E&M-EST. PATIENT-LVL II: CPT | Mod: PBBFAC,,, | Performed by: ADVANCED PRACTICE MIDWIFE

## 2018-05-16 PROCEDURE — 0502F SUBSEQUENT PRENATAL CARE: CPT | Mod: ,,, | Performed by: ADVANCED PRACTICE MIDWIFE

## 2018-05-16 RX ORDER — AMOXICILLIN 500 MG
CAPSULE ORAL DAILY
Status: ON HOLD | COMMUNITY
End: 2018-11-05 | Stop reason: HOSPADM

## 2018-05-16 NOTE — PROGRESS NOTES
Doing well   Discussed genetic testing options including timeframes, decline at this time  Anatomy ultrasound nv   PTL precautions reviewed  Coffective counseling sheet Fall In Love discussed with mother. Reinforced immediate skin to skin, the magic first hour, importance of the first feeding and delaying routine procedures. Encouraged mother to download Coffective mobile chuck if she has not already done so. Mother verbalizes understanding.

## 2018-06-08 ENCOUNTER — ROUTINE PRENATAL (OUTPATIENT)
Dept: OBSTETRICS AND GYNECOLOGY | Facility: CLINIC | Age: 28
End: 2018-06-08

## 2018-06-08 ENCOUNTER — PROCEDURE VISIT (OUTPATIENT)
Dept: OBSTETRICS AND GYNECOLOGY | Facility: CLINIC | Age: 28
End: 2018-06-08

## 2018-06-08 VITALS
SYSTOLIC BLOOD PRESSURE: 126 MMHG | WEIGHT: 156.94 LBS | DIASTOLIC BLOOD PRESSURE: 68 MMHG | BODY MASS INDEX: 23.18 KG/M2

## 2018-06-08 DIAGNOSIS — Z36.3 ANTENATAL SCREENING FOR MALFORMATION USING ULTRASONICS: ICD-10-CM

## 2018-06-08 DIAGNOSIS — N91.2 AMENORRHEA, UNSPECIFIED: ICD-10-CM

## 2018-06-08 DIAGNOSIS — Z34.82 ENCOUNTER FOR SUPERVISION OF OTHER NORMAL PREGNANCY IN SECOND TRIMESTER: Primary | ICD-10-CM

## 2018-06-08 PROCEDURE — 76805 OB US >/= 14 WKS SNGL FETUS: CPT | Mod: 26,S$PBB,, | Performed by: OBSTETRICS & GYNECOLOGY

## 2018-06-08 PROCEDURE — 99999 PR PBB SHADOW E&M-EST. PATIENT-LVL II: CPT | Mod: PBBFAC,,, | Performed by: ADVANCED PRACTICE MIDWIFE

## 2018-06-08 PROCEDURE — 76805 OB US >/= 14 WKS SNGL FETUS: CPT | Mod: PBBFAC | Performed by: OBSTETRICS & GYNECOLOGY

## 2018-06-08 PROCEDURE — 99213 OFFICE O/P EST LOW 20 MIN: CPT | Mod: 25,S$PBB,, | Performed by: ADVANCED PRACTICE MIDWIFE

## 2018-06-08 PROCEDURE — 99212 OFFICE O/P EST SF 10 MIN: CPT | Mod: PBBFAC,25 | Performed by: ADVANCED PRACTICE MIDWIFE

## 2018-06-08 NOTE — PROGRESS NOTES
Us reviewed with Pt and SO, repeat 4 weeks to complete anatomy.  Pt has downloaded the [employer] chuck.  Benefits of BF and skin to skin discussed.  RTC 4 weeks. VM

## 2018-07-06 ENCOUNTER — ROUTINE PRENATAL (OUTPATIENT)
Dept: OBSTETRICS AND GYNECOLOGY | Facility: CLINIC | Age: 28
End: 2018-07-06

## 2018-07-06 ENCOUNTER — PROCEDURE VISIT (OUTPATIENT)
Dept: OBSTETRICS AND GYNECOLOGY | Facility: CLINIC | Age: 28
End: 2018-07-06

## 2018-07-06 VITALS
SYSTOLIC BLOOD PRESSURE: 116 MMHG | WEIGHT: 166.25 LBS | DIASTOLIC BLOOD PRESSURE: 76 MMHG | BODY MASS INDEX: 24.55 KG/M2

## 2018-07-06 DIAGNOSIS — Z3A.24 24 WEEKS GESTATION OF PREGNANCY: ICD-10-CM

## 2018-07-06 DIAGNOSIS — Z36.89 ENCOUNTER FOR FETAL ANATOMIC SURVEY: ICD-10-CM

## 2018-07-06 DIAGNOSIS — Z34.82 ENCOUNTER FOR SUPERVISION OF OTHER NORMAL PREGNANCY IN SECOND TRIMESTER: Primary | ICD-10-CM

## 2018-07-06 DIAGNOSIS — Q51.28 SEPTATE UTERUS: ICD-10-CM

## 2018-07-06 PROCEDURE — 76816 OB US FOLLOW-UP PER FETUS: CPT | Mod: 26,S$PBB,, | Performed by: OBSTETRICS & GYNECOLOGY

## 2018-07-06 PROCEDURE — 99999 PR PBB SHADOW E&M-EST. PATIENT-LVL II: CPT | Mod: PBBFAC,,, | Performed by: ADVANCED PRACTICE MIDWIFE

## 2018-07-06 PROCEDURE — 76816 OB US FOLLOW-UP PER FETUS: CPT | Mod: PBBFAC | Performed by: OBSTETRICS & GYNECOLOGY

## 2018-07-06 PROCEDURE — 0502F SUBSEQUENT PRENATAL CARE: CPT | Mod: ,,, | Performed by: ADVANCED PRACTICE MIDWIFE

## 2018-07-06 PROCEDURE — 99212 OFFICE O/P EST SF 10 MIN: CPT | Mod: PBBFAC,25 | Performed by: ADVANCED PRACTICE MIDWIFE

## 2018-07-07 NOTE — PROGRESS NOTES
Doing well   Ultrasound today with breech presentation, posterior placenta, EFW 44%, and anatomy complete, reviewed with pt and    Discussed birth plan, options for medication in hospital   28 week labs nv, RH +; Stressed increasing protein and iron in her diet and decreasing carbs and sugars  Discussed Tdap recommendations and handout given, will offer at nv   Coffective counseling sheet Nourish discussed with mother. Reinforced basic breastfeeding position and latch as well as proper hand expression technique. Encouraged mother to download Coffective mobile chuck if she has not already done so.  Mother verbalizes understanding.

## 2018-08-07 ENCOUNTER — LAB VISIT (OUTPATIENT)
Dept: LAB | Facility: HOSPITAL | Age: 28
End: 2018-08-07
Attending: ADVANCED PRACTICE MIDWIFE

## 2018-08-07 ENCOUNTER — ROUTINE PRENATAL (OUTPATIENT)
Dept: OBSTETRICS AND GYNECOLOGY | Facility: CLINIC | Age: 28
End: 2018-08-07

## 2018-08-07 VITALS
WEIGHT: 172.19 LBS | SYSTOLIC BLOOD PRESSURE: 106 MMHG | DIASTOLIC BLOOD PRESSURE: 60 MMHG | BODY MASS INDEX: 25.43 KG/M2

## 2018-08-07 DIAGNOSIS — Z34.83 ENCOUNTER FOR SUPERVISION OF OTHER NORMAL PREGNANCY IN THIRD TRIMESTER: Primary | ICD-10-CM

## 2018-08-07 DIAGNOSIS — Z34.83 ENCOUNTER FOR SUPERVISION OF OTHER NORMAL PREGNANCY IN THIRD TRIMESTER: ICD-10-CM

## 2018-08-07 LAB
BASOPHILS # BLD AUTO: 0.04 K/UL
BASOPHILS NFR BLD: 0.3 %
DIFFERENTIAL METHOD: ABNORMAL
EOSINOPHIL # BLD AUTO: 0.2 K/UL
EOSINOPHIL NFR BLD: 1.6 %
ERYTHROCYTE [DISTWIDTH] IN BLOOD BY AUTOMATED COUNT: 13 %
GLUCOSE SERPL-MCNC: 109 MG/DL
HCT VFR BLD AUTO: 37.2 %
HGB BLD-MCNC: 12.2 G/DL
IMM GRANULOCYTES # BLD AUTO: 0.06 K/UL
IMM GRANULOCYTES NFR BLD AUTO: 0.5 %
LYMPHOCYTES # BLD AUTO: 1.7 K/UL
LYMPHOCYTES NFR BLD: 14.6 %
MCH RBC QN AUTO: 29.9 PG
MCHC RBC AUTO-ENTMCNC: 32.8 G/DL
MCV RBC AUTO: 91 FL
MONOCYTES # BLD AUTO: 0.5 K/UL
MONOCYTES NFR BLD: 4.5 %
NEUTROPHILS # BLD AUTO: 9.1 K/UL
NEUTROPHILS NFR BLD: 78.5 %
NRBC BLD-RTO: 0 /100 WBC
PLATELET # BLD AUTO: 240 K/UL
PMV BLD AUTO: 10.3 FL
RBC # BLD AUTO: 4.08 M/UL
WBC # BLD AUTO: 11.61 K/UL

## 2018-08-07 PROCEDURE — 82950 GLUCOSE TEST: CPT

## 2018-08-07 PROCEDURE — 85025 COMPLETE CBC W/AUTO DIFF WBC: CPT

## 2018-08-07 PROCEDURE — 36415 COLL VENOUS BLD VENIPUNCTURE: CPT

## 2018-08-07 PROCEDURE — 99999 PR PBB SHADOW E&M-EST. PATIENT-LVL II: CPT | Mod: PBBFAC,,, | Performed by: ADVANCED PRACTICE MIDWIFE

## 2018-08-07 PROCEDURE — 86703 HIV-1/HIV-2 1 RESULT ANTBDY: CPT

## 2018-08-07 PROCEDURE — 99212 OFFICE O/P EST SF 10 MIN: CPT | Mod: PBBFAC | Performed by: ADVANCED PRACTICE MIDWIFE

## 2018-08-07 PROCEDURE — 86592 SYPHILIS TEST NON-TREP QUAL: CPT

## 2018-08-07 PROCEDURE — 0502F SUBSEQUENT PRENATAL CARE: CPT | Mod: ,,, | Performed by: ADVANCED PRACTICE MIDWIFE

## 2018-08-07 NOTE — PROGRESS NOTES
Doing well   Stressed hydration   Labs today, RH +  Declined Tdap   PTL precautions reviewed, when to go to hospital   Coffective counseling sheet Keep Baby Close discussed with mother. Reinforced rooming in practices, continued skin to skin, and quiet hours as requested by mother.  Encouraged mother to download Coffective mobile chuck if she has not already done so. Mother verbalizes understanding.

## 2018-08-08 LAB
HIV 1+2 AB+HIV1 P24 AG SERPL QL IA: NEGATIVE
RPR SER QL: NORMAL

## 2018-08-21 ENCOUNTER — ROUTINE PRENATAL (OUTPATIENT)
Dept: OBSTETRICS AND GYNECOLOGY | Facility: CLINIC | Age: 28
End: 2018-08-21

## 2018-08-21 VITALS — BODY MASS INDEX: 25.91 KG/M2 | SYSTOLIC BLOOD PRESSURE: 120 MMHG | DIASTOLIC BLOOD PRESSURE: 68 MMHG | WEIGHT: 175.5 LBS

## 2018-08-21 DIAGNOSIS — Z34.83 ENCOUNTER FOR SUPERVISION OF OTHER NORMAL PREGNANCY IN THIRD TRIMESTER: Primary | ICD-10-CM

## 2018-08-21 PROCEDURE — 0502F SUBSEQUENT PRENATAL CARE: CPT | Mod: ,,, | Performed by: ADVANCED PRACTICE MIDWIFE

## 2018-08-21 PROCEDURE — 99999 PR PBB SHADOW E&M-EST. PATIENT-LVL II: CPT | Mod: PBBFAC,,, | Performed by: ADVANCED PRACTICE MIDWIFE

## 2018-08-21 PROCEDURE — 99212 OFFICE O/P EST SF 10 MIN: CPT | Mod: PBBFAC | Performed by: ADVANCED PRACTICE MIDWIFE

## 2018-08-21 NOTE — PROGRESS NOTES
Doing well, reports fm denies ctx vb or lof   ptl precautions given   Aware of labs   RTC 2 Weeks   Encouraged hydration

## 2018-09-07 ENCOUNTER — ROUTINE PRENATAL (OUTPATIENT)
Dept: OBSTETRICS AND GYNECOLOGY | Facility: CLINIC | Age: 28
End: 2018-09-07

## 2018-09-07 VITALS
BODY MASS INDEX: 26.18 KG/M2 | SYSTOLIC BLOOD PRESSURE: 120 MMHG | DIASTOLIC BLOOD PRESSURE: 60 MMHG | WEIGHT: 177.25 LBS

## 2018-09-07 DIAGNOSIS — Z34.83 ENCOUNTER FOR SUPERVISION OF OTHER NORMAL PREGNANCY IN THIRD TRIMESTER: Primary | ICD-10-CM

## 2018-09-07 PROCEDURE — 99212 OFFICE O/P EST SF 10 MIN: CPT | Mod: PBBFAC | Performed by: ADVANCED PRACTICE MIDWIFE

## 2018-09-07 PROCEDURE — 0502F SUBSEQUENT PRENATAL CARE: CPT | Mod: ,,, | Performed by: ADVANCED PRACTICE MIDWIFE

## 2018-09-07 PROCEDURE — 99999 PR PBB SHADOW E&M-EST. PATIENT-LVL II: CPT | Mod: PBBFAC,,, | Performed by: ADVANCED PRACTICE MIDWIFE

## 2018-09-17 ENCOUNTER — TELEPHONE (OUTPATIENT)
Dept: INTERNAL MEDICINE | Facility: CLINIC | Age: 28
End: 2018-09-17

## 2018-09-21 ENCOUNTER — ROUTINE PRENATAL (OUTPATIENT)
Dept: OBSTETRICS AND GYNECOLOGY | Facility: CLINIC | Age: 28
End: 2018-09-21

## 2018-09-21 VITALS
SYSTOLIC BLOOD PRESSURE: 126 MMHG | WEIGHT: 181.19 LBS | BODY MASS INDEX: 26.76 KG/M2 | DIASTOLIC BLOOD PRESSURE: 58 MMHG

## 2018-09-21 DIAGNOSIS — Z3A.35 35 WEEKS GESTATION OF PREGNANCY: ICD-10-CM

## 2018-09-21 DIAGNOSIS — Q51.28 SEPTATE UTERUS: ICD-10-CM

## 2018-09-21 DIAGNOSIS — Z34.83 ENCOUNTER FOR SUPERVISION OF OTHER NORMAL PREGNANCY IN THIRD TRIMESTER: Primary | ICD-10-CM

## 2018-09-21 PROCEDURE — 99999 PR PBB SHADOW E&M-EST. PATIENT-LVL II: CPT | Mod: PBBFAC,,, | Performed by: MIDWIFE

## 2018-09-21 PROCEDURE — 0502F SUBSEQUENT PRENATAL CARE: CPT | Mod: ,,, | Performed by: MIDWIFE

## 2018-09-21 PROCEDURE — 99212 OFFICE O/P EST SF 10 MIN: CPT | Mod: PBBFAC | Performed by: MIDWIFE

## 2018-10-05 ENCOUNTER — ROUTINE PRENATAL (OUTPATIENT)
Dept: OBSTETRICS AND GYNECOLOGY | Facility: CLINIC | Age: 28
End: 2018-10-05

## 2018-10-05 VITALS — DIASTOLIC BLOOD PRESSURE: 72 MMHG | SYSTOLIC BLOOD PRESSURE: 124 MMHG | BODY MASS INDEX: 27.84 KG/M2 | WEIGHT: 188.5 LBS

## 2018-10-05 DIAGNOSIS — Z34.83 ENCOUNTER FOR SUPERVISION OF OTHER NORMAL PREGNANCY IN THIRD TRIMESTER: Primary | ICD-10-CM

## 2018-10-05 PROCEDURE — 0502F SUBSEQUENT PRENATAL CARE: CPT | Mod: ,,, | Performed by: OBSTETRICS & GYNECOLOGY

## 2018-10-05 PROCEDURE — 99213 OFFICE O/P EST LOW 20 MIN: CPT | Mod: PBBFAC | Performed by: OBSTETRICS & GYNECOLOGY

## 2018-10-05 PROCEDURE — 99999 PR PBB SHADOW E&M-EST. PATIENT-LVL III: CPT | Mod: PBBFAC,,, | Performed by: OBSTETRICS & GYNECOLOGY

## 2018-10-05 PROCEDURE — 87081 CULTURE SCREEN ONLY: CPT

## 2018-10-06 NOTE — PROGRESS NOTES
No complaints.  Good fetal movement.  Declines flu vaccine.  Plan:  RTC 1 week.  GBS done today.  Planning natural birth.

## 2018-10-08 LAB — BACTERIA SPEC AEROBE CULT: NORMAL

## 2018-10-12 ENCOUNTER — ROUTINE PRENATAL (OUTPATIENT)
Dept: OBSTETRICS AND GYNECOLOGY | Facility: CLINIC | Age: 28
End: 2018-10-12

## 2018-10-12 VITALS — SYSTOLIC BLOOD PRESSURE: 120 MMHG | DIASTOLIC BLOOD PRESSURE: 68 MMHG | WEIGHT: 188.94 LBS | BODY MASS INDEX: 27.9 KG/M2

## 2018-10-12 DIAGNOSIS — Z34.83 ENCOUNTER FOR SUPERVISION OF OTHER NORMAL PREGNANCY IN THIRD TRIMESTER: ICD-10-CM

## 2018-10-12 DIAGNOSIS — O26.20 RECURRENT PREGNANCY LOSS WITH CURRENT PREGNANCY: Primary | ICD-10-CM

## 2018-10-12 PROCEDURE — 99999 PR PBB SHADOW E&M-EST. PATIENT-LVL II: CPT | Mod: PBBFAC,,, | Performed by: ADVANCED PRACTICE MIDWIFE

## 2018-10-12 PROCEDURE — 0502F SUBSEQUENT PRENATAL CARE: CPT | Mod: ,,, | Performed by: ADVANCED PRACTICE MIDWIFE

## 2018-10-12 PROCEDURE — 99212 OFFICE O/P EST SF 10 MIN: CPT | Mod: PBBFAC | Performed by: ADVANCED PRACTICE MIDWIFE

## 2018-10-15 NOTE — PROGRESS NOTES
Doing well, reports fm and denies vb lof or ctx   Aware of GBS results  Labor talk given and when to go to ldr   Declined ve   RTC 1 week

## 2018-10-19 ENCOUNTER — ROUTINE PRENATAL (OUTPATIENT)
Dept: OBSTETRICS AND GYNECOLOGY | Facility: CLINIC | Age: 28
End: 2018-10-19

## 2018-10-19 VITALS — WEIGHT: 188.94 LBS | DIASTOLIC BLOOD PRESSURE: 68 MMHG | SYSTOLIC BLOOD PRESSURE: 112 MMHG | BODY MASS INDEX: 27.9 KG/M2

## 2018-10-19 DIAGNOSIS — Q51.28 SEPTATE UTERUS: ICD-10-CM

## 2018-10-19 DIAGNOSIS — Z34.83 ENCOUNTER FOR SUPERVISION OF OTHER NORMAL PREGNANCY IN THIRD TRIMESTER: Primary | ICD-10-CM

## 2018-10-19 PROCEDURE — 99999 PR PBB SHADOW E&M-EST. PATIENT-LVL II: CPT | Mod: PBBFAC,,, | Performed by: ADVANCED PRACTICE MIDWIFE

## 2018-10-19 PROCEDURE — 99212 OFFICE O/P EST SF 10 MIN: CPT | Mod: PBBFAC | Performed by: ADVANCED PRACTICE MIDWIFE

## 2018-10-19 PROCEDURE — 0502F SUBSEQUENT PRENATAL CARE: CPT | Mod: ,,, | Performed by: ADVANCED PRACTICE MIDWIFE

## 2018-10-19 NOTE — PROGRESS NOTES
Doing well   Labor precautions and fetal movement reviewed, when to go to hospital   VE per pt request   Lower abdominal incision from perforated uterus during septate surgery

## 2018-10-26 ENCOUNTER — ROUTINE PRENATAL (OUTPATIENT)
Dept: OBSTETRICS AND GYNECOLOGY | Facility: CLINIC | Age: 28
End: 2018-10-26

## 2018-10-26 VITALS
BODY MASS INDEX: 28.23 KG/M2 | DIASTOLIC BLOOD PRESSURE: 78 MMHG | WEIGHT: 191.13 LBS | SYSTOLIC BLOOD PRESSURE: 120 MMHG

## 2018-10-26 DIAGNOSIS — Q51.28 SEPTATE UTERUS: ICD-10-CM

## 2018-10-26 DIAGNOSIS — Z34.83 ENCOUNTER FOR SUPERVISION OF OTHER NORMAL PREGNANCY IN THIRD TRIMESTER: Primary | ICD-10-CM

## 2018-10-26 PROCEDURE — 99999 PR PBB SHADOW E&M-EST. PATIENT-LVL III: CPT | Mod: PBBFAC,,, | Performed by: ADVANCED PRACTICE MIDWIFE

## 2018-10-26 PROCEDURE — 99213 OFFICE O/P EST LOW 20 MIN: CPT | Mod: PBBFAC | Performed by: ADVANCED PRACTICE MIDWIFE

## 2018-10-26 PROCEDURE — 0502F SUBSEQUENT PRENATAL CARE: CPT | Mod: ,,, | Performed by: ADVANCED PRACTICE MIDWIFE

## 2018-10-26 NOTE — PROGRESS NOTES
Still a little firmness to cx  If undelivered over W/E US and visit Monday  Great FM and labor precautions discussed

## 2018-10-26 NOTE — PATIENT INSTRUCTIONS
Recognizing Labor    The beginning of labor is the beginning of birth. Youll start to feel strong contractions. Thats when the muscles of your uterus tighten up to help push your baby out during birth.  Yes, labor has probably started   Signs of labor include:  · Your contractions are getting stronger and more painful instead of weaker. Youll probably feel them throughout your whole uterus.  · Your contractions are regular. This means that you feel them about every 5 to 10 minutes. And they are getting closer together.  · You have pink-colored or blood-streaked fluid from your vagina.  · Your water breaks. It may be a gush or a slow trickle of clear fluid from your vagina.  No, its probably not real labor   Signs of false labor include:  · Your contractions arent regular or strong.  · You feel the contractions only in your lower uterus.  · Your contractions go away when you walk or change position.  · Your contractions go away after drinking fluids.  When to call your healthcare provider  Call your healthcare provider or clinic right away if you notice any of these signs:  · Fluid from your vagina, with or without contractions.  · Bleeding heavy enough that you need a sanitary pad.  · You dont feel your baby moving as much as before.     NOTE: Contractions are timed by both of these measures:  · The length of each contraction from its start to its finish.  · How far apart the contractions are -- the time between the start of one contraction and the start of the next contraction.   Date Last Reviewed: 8/9/2015  © 1855-2218 [employer]. [address] [place] [address], [place] [address]. All rights reserved. This information is not intended as a substitute for professional medical care. Always follow your healthcare professional's instructions.        Kick Counts    Its normal to worry about your babys health. One way you can know your babys doing well is to record the babys movements once a day. This  is called a kick count. Remember to take your kick count records to all your appointments with your healthcare provider.  How to count kicks  Here are tips for counting kicks:  · Choose a time when the baby is active, such as after a meal.   · Sit comfortably or lie on your side.   · The first time the baby moves, write down the time.   · Count each movement until the baby has moved 10 times. This can take from 20 minutes to 2 hours.   · Try to do it at the same time each day.  When to call your healthcare provider  Call your healthcare provider right away if you notice any of the following:  · Your baby moves fewer than 10 times in 2 hours while youre doing kick counts.  · Your baby moves much less often than on the days before.  · You have not felt your baby move all day.  Date Last Reviewed: 8/5/2015  © 1119-1385 [employer], [employer]. [address] [place] [address], [place] [address]. All rights reserved. This information is not intended as a substitute for professional medical care. Always follow your healthcare professional's instructions.

## 2018-10-29 ENCOUNTER — PROCEDURE VISIT (OUTPATIENT)
Dept: OBSTETRICS AND GYNECOLOGY | Facility: CLINIC | Age: 28
End: 2018-10-29

## 2018-10-29 ENCOUNTER — ROUTINE PRENATAL (OUTPATIENT)
Dept: OBSTETRICS AND GYNECOLOGY | Facility: CLINIC | Age: 28
End: 2018-10-29

## 2018-10-29 VITALS
SYSTOLIC BLOOD PRESSURE: 118 MMHG | WEIGHT: 193.56 LBS | DIASTOLIC BLOOD PRESSURE: 76 MMHG | BODY MASS INDEX: 28.58 KG/M2

## 2018-10-29 DIAGNOSIS — Z34.83 ENCOUNTER FOR SUPERVISION OF OTHER NORMAL PREGNANCY IN THIRD TRIMESTER: ICD-10-CM

## 2018-10-29 DIAGNOSIS — O48.0 POST TERM PREGNANCY OVER 40 WEEKS: ICD-10-CM

## 2018-10-29 PROCEDURE — 99999 PR PBB SHADOW E&M-EST. PATIENT-LVL II: CPT | Mod: PBBFAC,,, | Performed by: ADVANCED PRACTICE MIDWIFE

## 2018-10-29 PROCEDURE — 99212 OFFICE O/P EST SF 10 MIN: CPT | Mod: PBBFAC | Performed by: ADVANCED PRACTICE MIDWIFE

## 2018-10-29 PROCEDURE — 76819 FETAL BIOPHYS PROFIL W/O NST: CPT | Mod: PBBFAC | Performed by: OBSTETRICS & GYNECOLOGY

## 2018-10-29 PROCEDURE — 76816 OB US FOLLOW-UP PER FETUS: CPT | Mod: PBBFAC | Performed by: OBSTETRICS & GYNECOLOGY

## 2018-10-29 PROCEDURE — 76819 FETAL BIOPHYS PROFIL W/O NST: CPT | Mod: 26,S$PBB,, | Performed by: OBSTETRICS & GYNECOLOGY

## 2018-10-29 PROCEDURE — 76816 OB US FOLLOW-UP PER FETUS: CPT | Mod: 26,S$PBB,, | Performed by: OBSTETRICS & GYNECOLOGY

## 2018-10-29 PROCEDURE — 0502F SUBSEQUENT PRENATAL CARE: CPT | Mod: ,,, | Performed by: ADVANCED PRACTICE MIDWIFE

## 2018-10-29 NOTE — PROGRESS NOTES
10/26/2018- discussed with Dr. Emerson the use of Cytotec should it be indicated - its use was approved

## 2018-10-29 NOTE — PROGRESS NOTES
Doing well reports fm and denies vb lof   US today mvp 3.7, BPP 8/8, 77% growth   Discussed post term and POC, pt and  verbalized they want to go to 42 weeks until IOL  VE done, bloody show noted   RTC Thurs with BPP   BID fetal kick counts and labor precautions given and when to go to ldr

## 2018-11-01 ENCOUNTER — ROUTINE PRENATAL (OUTPATIENT)
Dept: OBSTETRICS AND GYNECOLOGY | Facility: CLINIC | Age: 28
End: 2018-11-01

## 2018-11-01 ENCOUNTER — PROCEDURE VISIT (OUTPATIENT)
Dept: OBSTETRICS AND GYNECOLOGY | Facility: CLINIC | Age: 28
End: 2018-11-01

## 2018-11-01 VITALS
WEIGHT: 193.56 LBS | BODY MASS INDEX: 28.58 KG/M2 | DIASTOLIC BLOOD PRESSURE: 82 MMHG | SYSTOLIC BLOOD PRESSURE: 130 MMHG

## 2018-11-01 DIAGNOSIS — O48.0 POST TERM PREGNANCY OVER 40 WEEKS: Primary | ICD-10-CM

## 2018-11-01 DIAGNOSIS — O48.0 POST TERM PREGNANCY OVER 40 WEEKS: ICD-10-CM

## 2018-11-01 PROCEDURE — 99212 OFFICE O/P EST SF 10 MIN: CPT | Mod: PBBFAC,25 | Performed by: ADVANCED PRACTICE MIDWIFE

## 2018-11-01 PROCEDURE — 99999 PR PBB SHADOW E&M-EST. PATIENT-LVL II: CPT | Mod: PBBFAC,,, | Performed by: ADVANCED PRACTICE MIDWIFE

## 2018-11-01 PROCEDURE — 76819 FETAL BIOPHYS PROFIL W/O NST: CPT | Mod: 26,S$PBB,, | Performed by: OBSTETRICS & GYNECOLOGY

## 2018-11-01 PROCEDURE — 76819 FETAL BIOPHYS PROFIL W/O NST: CPT | Mod: PBBFAC | Performed by: OBSTETRICS & GYNECOLOGY

## 2018-11-01 PROCEDURE — 0502F SUBSEQUENT PRENATAL CARE: CPT | Mod: ,,, | Performed by: ADVANCED PRACTICE MIDWIFE

## 2018-11-01 RX ORDER — GINKGO BILOBA LEAF EXTRACT 60 MG
CAPSULE ORAL
Status: ON HOLD | COMMUNITY
End: 2018-11-05 | Stop reason: HOSPADM

## 2018-11-01 NOTE — PROGRESS NOTES
"BPP 8/8 good FM, some "menstrual cramps", cx soft/favorable, gentle sweep performed.Declines IOL until 42 wks. Exp reasons to go to LND over the weekend. S/s of labor reviewed. Desires natural birth. IOL scheduled for 11/6 @ 5AM if undelivered. al  "

## 2018-11-03 ENCOUNTER — HOSPITAL ENCOUNTER (INPATIENT)
Facility: HOSPITAL | Age: 28
LOS: 2 days | Discharge: HOME | End: 2018-11-05
Attending: OBSTETRICS & GYNECOLOGY | Admitting: OBSTETRICS & GYNECOLOGY

## 2018-11-03 DIAGNOSIS — Z37.9 NORMAL LABOR: ICD-10-CM

## 2018-11-03 PROBLEM — Z34.90 SUPERVISION OF NORMAL PREGNANCY: Status: RESOLVED | Noted: 2018-03-09 | Resolved: 2018-11-03

## 2018-11-03 PROBLEM — S31.41XA VAGINAL LACERATION: Status: ACTIVE | Noted: 2018-11-03

## 2018-11-03 PROBLEM — O46.8X1 SUBCHORIONIC HEMATOMA IN FIRST TRIMESTER: Status: RESOLVED | Noted: 2018-04-13 | Resolved: 2018-11-03

## 2018-11-03 PROBLEM — O41.8X10 SUBCHORIONIC HEMATOMA IN FIRST TRIMESTER: Status: RESOLVED | Noted: 2018-04-13 | Resolved: 2018-11-03

## 2018-11-03 PROBLEM — O26.20 RECURRENT PREGNANCY LOSS WITH CURRENT PREGNANCY: Status: RESOLVED | Noted: 2017-12-16 | Resolved: 2018-11-03

## 2018-11-03 LAB
ABO + RH BLD: NORMAL
BASOPHILS # BLD AUTO: 0.02 K/UL
BASOPHILS NFR BLD: 0.1 %
BLD GP AB SCN CELLS X3 SERPL QL: NORMAL
DIFFERENTIAL METHOD: ABNORMAL
EOSINOPHIL # BLD AUTO: 0.1 K/UL
EOSINOPHIL NFR BLD: 0.7 %
ERYTHROCYTE [DISTWIDTH] IN BLOOD BY AUTOMATED COUNT: 13.4 %
HCT VFR BLD AUTO: 35.7 %
HGB BLD-MCNC: 12.6 G/DL
LYMPHOCYTES # BLD AUTO: 1.7 K/UL
LYMPHOCYTES NFR BLD: 12.3 %
MCH RBC QN AUTO: 30.7 PG
MCHC RBC AUTO-ENTMCNC: 35.3 G/DL
MCV RBC AUTO: 87 FL
MONOCYTES # BLD AUTO: 0.9 K/UL
MONOCYTES NFR BLD: 6.8 %
NEUTROPHILS # BLD AUTO: 10.9 K/UL
NEUTROPHILS NFR BLD: 80.1 %
PLATELET # BLD AUTO: 172 K/UL
PMV BLD AUTO: 10.7 FL
RBC # BLD AUTO: 4.1 M/UL
WBC # BLD AUTO: 13.66 K/UL

## 2018-11-03 PROCEDURE — 72200004 HC VAGINAL DELIVERY LEVEL I

## 2018-11-03 PROCEDURE — 72100002 HC LABOR CARE, 1ST 8 HOURS

## 2018-11-03 PROCEDURE — 63600175 PHARM REV CODE 636 W HCPCS: Performed by: ADVANCED PRACTICE MIDWIFE

## 2018-11-03 PROCEDURE — 85025 COMPLETE CBC W/AUTO DIFF WBC: CPT

## 2018-11-03 PROCEDURE — 0KQM0ZZ REPAIR PERINEUM MUSCLE, OPEN APPROACH: ICD-10-PCS | Performed by: OBSTETRICS & GYNECOLOGY

## 2018-11-03 PROCEDURE — 25000003 PHARM REV CODE 250: Performed by: ADVANCED PRACTICE MIDWIFE

## 2018-11-03 PROCEDURE — 11000001 HC ACUTE MED/SURG PRIVATE ROOM

## 2018-11-03 PROCEDURE — 10907ZC DRAINAGE OF AMNIOTIC FLUID, THERAPEUTIC FROM PRODUCTS OF CONCEPTION, VIA NATURAL OR ARTIFICIAL OPENING: ICD-10-PCS | Performed by: OBSTETRICS & GYNECOLOGY

## 2018-11-03 PROCEDURE — 72100003 HC LABOR CARE, EA. ADDL. 8 HRS

## 2018-11-03 PROCEDURE — 10D17Z9 MANUAL EXTRACTION OF PRODUCTS OF CONCEPTION, RETAINED, VIA NATURAL OR ARTIFICIAL OPENING: ICD-10-PCS | Performed by: OBSTETRICS & GYNECOLOGY

## 2018-11-03 PROCEDURE — 86850 RBC ANTIBODY SCREEN: CPT

## 2018-11-03 RX ORDER — CEFAZOLIN SODIUM 1 G/50ML
1 SOLUTION INTRAVENOUS ONCE
Status: CANCELLED | OUTPATIENT
Start: 2018-11-03

## 2018-11-03 RX ORDER — ONDANSETRON 8 MG/1
8 TABLET, ORALLY DISINTEGRATING ORAL EVERY 8 HOURS PRN
Status: CANCELLED | OUTPATIENT
Start: 2018-11-03

## 2018-11-03 RX ORDER — IBUPROFEN 600 MG/1
600 TABLET ORAL EVERY 6 HOURS
Status: DISCONTINUED | OUTPATIENT
Start: 2018-11-04 | End: 2018-11-03

## 2018-11-03 RX ORDER — OXYTOCIN/RINGER'S LACTATE 20/1000 ML
333 PLASTIC BAG, INJECTION (ML) INTRAVENOUS CONTINUOUS
Status: ACTIVE | OUTPATIENT
Start: 2018-11-03 | End: 2018-11-03

## 2018-11-03 RX ORDER — ACETAMINOPHEN 325 MG/1
650 TABLET ORAL EVERY 6 HOURS PRN
Status: DISCONTINUED | OUTPATIENT
Start: 2018-11-03 | End: 2018-11-05 | Stop reason: HOSPADM

## 2018-11-03 RX ORDER — DOCUSATE SODIUM 100 MG/1
200 CAPSULE, LIQUID FILLED ORAL 2 TIMES DAILY PRN
Status: DISCONTINUED | OUTPATIENT
Start: 2018-11-03 | End: 2018-11-05 | Stop reason: HOSPADM

## 2018-11-03 RX ORDER — DIPHENHYDRAMINE HCL 25 MG
25 CAPSULE ORAL EVERY 4 HOURS PRN
Status: DISCONTINUED | OUTPATIENT
Start: 2018-11-03 | End: 2018-11-05 | Stop reason: HOSPADM

## 2018-11-03 RX ORDER — SODIUM CHLORIDE, SODIUM LACTATE, POTASSIUM CHLORIDE, CALCIUM CHLORIDE 600; 310; 30; 20 MG/100ML; MG/100ML; MG/100ML; MG/100ML
INJECTION, SOLUTION INTRAVENOUS CONTINUOUS
Status: DISCONTINUED | OUTPATIENT
Start: 2018-11-03 | End: 2018-11-05 | Stop reason: HOSPADM

## 2018-11-03 RX ORDER — CEFAZOLIN SODIUM 1 G/50ML
1 SOLUTION INTRAVENOUS ONCE
Status: COMPLETED | OUTPATIENT
Start: 2018-11-03 | End: 2018-11-03

## 2018-11-03 RX ORDER — SODIUM CHLORIDE 9 MG/ML
INJECTION, SOLUTION INTRAVENOUS
Status: DISCONTINUED | OUTPATIENT
Start: 2018-11-03 | End: 2018-11-05 | Stop reason: HOSPADM

## 2018-11-03 RX ORDER — HYDROCODONE BITARTRATE AND ACETAMINOPHEN 5; 325 MG/1; MG/1
1 TABLET ORAL EVERY 4 HOURS PRN
Status: DISCONTINUED | OUTPATIENT
Start: 2018-11-03 | End: 2018-11-05 | Stop reason: HOSPADM

## 2018-11-03 RX ORDER — OXYTOCIN/RINGER'S LACTATE 20/1000 ML
41.65 PLASTIC BAG, INJECTION (ML) INTRAVENOUS CONTINUOUS
Status: DISPENSED | OUTPATIENT
Start: 2018-11-03 | End: 2018-11-04

## 2018-11-03 RX ORDER — ONDANSETRON 8 MG/1
8 TABLET, ORALLY DISINTEGRATING ORAL EVERY 8 HOURS PRN
Status: DISCONTINUED | OUTPATIENT
Start: 2018-11-03 | End: 2018-11-05 | Stop reason: HOSPADM

## 2018-11-03 RX ORDER — IBUPROFEN 600 MG/1
600 TABLET ORAL EVERY 6 HOURS
Status: DISCONTINUED | OUTPATIENT
Start: 2018-11-03 | End: 2018-11-05 | Stop reason: HOSPADM

## 2018-11-03 RX ORDER — MISOPROSTOL 200 UG/1
600 TABLET ORAL
Status: DISCONTINUED | OUTPATIENT
Start: 2018-11-03 | End: 2018-11-05 | Stop reason: HOSPADM

## 2018-11-03 RX ORDER — HYDROCORTISONE 25 MG/G
CREAM TOPICAL 3 TIMES DAILY PRN
Status: DISCONTINUED | OUTPATIENT
Start: 2018-11-03 | End: 2018-11-05 | Stop reason: HOSPADM

## 2018-11-03 RX ORDER — OXYTOCIN/RINGER'S LACTATE 20/1000 ML
41.7 PLASTIC BAG, INJECTION (ML) INTRAVENOUS CONTINUOUS
Status: ACTIVE | OUTPATIENT
Start: 2018-11-03 | End: 2018-11-03

## 2018-11-03 RX ORDER — DIPHENHYDRAMINE HYDROCHLORIDE 50 MG/ML
25 INJECTION INTRAMUSCULAR; INTRAVENOUS EVERY 4 HOURS PRN
Status: DISCONTINUED | OUTPATIENT
Start: 2018-11-03 | End: 2018-11-03 | Stop reason: RX

## 2018-11-03 RX ADMIN — IBUPROFEN 600 MG: 600 TABLET ORAL at 07:11

## 2018-11-03 RX ADMIN — Medication 333 MILLI-UNITS/MIN: at 06:11

## 2018-11-03 RX ADMIN — CEFAZOLIN SODIUM 1 G: 1 SOLUTION INTRAVENOUS at 06:11

## 2018-11-03 NOTE — PROGRESS NOTES
"S: Request cervical exam  O:  VS reviewed, afebrile   Vitals:    11/03/18 0900 11/03/18 1003   BP: 134/86    Pulse: 80    Resp: 18    Temp:  98.2 °F (36.8 °C)   TempSrc:  Oral   Weight:  87.5 kg (193 lb)   Height:  5' 9" (1.753 m)       FHTs intermittent q 1 hr per patient request  UC q3-4  SVE 5/100/V/-1 per RN    A: IUP @ 41w4d ;     Patient Active Problem List   Diagnosis    Recurrent pregnancy loss with current pregnancy    Septate uterus    Supervision of normal pregnancy    Post term pregnancy over 40 weeks    Normal labor       P:   Continue close monitoring of maternal/fetal status  "

## 2018-11-03 NOTE — NURSING
"Discussed feeding choice with mother.  Reviewed benefits of breastfeeding and risks of formula feeding. Patient given "What to Expect in the First 48 Hours" handout. Mother states her intention is to breastfeed.   Coffective counseling sheet Fall in Love discussed with mother. Reinforced immediate skin to skin, the magic first hour, importance of the first feeding and delaying routine procedures. Encouraged mother to download Coffective mobile chuck if she has not already done so. Mother verbalies understanding.  "

## 2018-11-03 NOTE — L&D DELIVERY NOTE
[facility]  Vaginal Delivery   Operative Note    SUMMARY     Normal spontaneous vaginal delivery of live infant, was placed on mothers abdomen for skin to skin and bulb suctioning performed.  Infant delivered position OA over intact perineum.  Nuchal cord: No.    Manual delivery of placenta good uterine tone.  2nd degree laceration noted and repaired in normal fashion with 2.0 vicryl x's 1.  Vaginal floor laceration. Repair assisted with 10cc 2% lidocaine and lidocaine jel  Patient tolerated delivery well. Sponge needle and lap counted correctly x2.  Manual removal of placenta due to velamentous cord insertion. Ancef 1 gm one time dose ordered.  EBL 200cc    Indications: Normal labor  Pregnancy complicated by:   Patient Active Problem List   Diagnosis    Recurrent pregnancy loss with current pregnancy    Septate uterus    Supervision of normal pregnancy    Post term pregnancy over 40 weeks    Normal labor     Admitting GA: 41w4d    Delivery Information for  Mark Harp    Birth information:  YOB: 2019   Time of birth: 5:39 PM   Sex: female   Head Delivery Date/Time: 11/3/2018  5:39 PM   Delivery type: Vaginal, Spontaneous   Gestational Age: 41w4d    Delivery Providers    Delivering clinician:  Erika Ferreira CNM   Provider Role    Nola Wasserman RN Registered Nurse    Apruva Oneill, [facility]    Kenyetta Stewart RN Registered Nurse    Dafne Land RN Registered Nurse            Measurements    Weight:    Length:           Apgars    Living status:  Living  Apgars:   1 min.:   5 min.:   10 min.:   15 min.:   20 min.:     Skin color:   1  1       Heart rate:   2  2       Reflex irritability:   2  2       Muscle tone:   2  2       Respiratory effort:   2  2       Total:   9  9       Apgars assigned by:  KENYETTA STEWART RN         Operative Delivery    Forceps attempted?:  No  Vacuum extractor attempted?:  No         Shoulder Dystocia    Shoulder dystocia  present?:  No           Presentation    Presentation:  Vertex  Position:  Occiput Anterior           Interventions/Resuscitation    Method:  Bulb Suctioning, Tactile Stimulation       Cord    Complications:  None  Delayed Cord Clamping?:  Yes  Cord Clamped Date/Time:  11/3/2018  5:42 PM  Cord Blood Disposition:  Lab  Gases Sent?:  No  Stem Cell Collection (by MD):  No       Placenta    Placenta delivery date/time:  11/3/2018 1752  Placenta removal:  Spontaneous  Placenta appearance:  Intact  Placenta disposition:  discarded           Labor Events:       labor: No     Labor Onset Date/Time:         Dilation Complete Date/Time:         Start Pushing Date/Time:       Rupture Date/Time: 18  1520         Rupture type: artificial rupture of membranes         Fluid Amount: small      Fluid Color: clear      Fluid Odor:        Membrane Status (PeriCalm):        Rupture Date/Time (PeriCalm):        Fluid Amount (PeriCalm):        Fluid Color (PeriCalm):         steroids:       Antibiotics given for GBS: No     Induction:       Indications for induction:        Augmentation:       Indications for augmentation:       Labor complications: None     Additional complications:          Cervical ripening:                     Delivery:      Episiotomy: None     Indication for Episiotomy:       Perineal Lacerations:   Repaired:      Periurethral Laceration:   Repaired:     Labial Laceration:   Repaired:     Sulcus Laceration:   Repaired:     Vaginal Laceration: Yes Repaired: Yes   Cervical Laceration:   Repaired:     Repair suture:       Repair # of packets: 1     Vaginal delivery QBL (mL): 200      QBL (mL): 0     Combined Blood Loss (mL): 200     Vaginal Sweep Performed: Yes     Surgicount Correct: Yes       Other providers:       Anesthesia    Method:  None          Details (if applicable):  Trial of Labor      Categorization:      Priority:     Indications for :      Incision Type:       Additional  information:  Forceps:    Vacuum:    Breech:    Observed anomalies    Other (Comments):

## 2018-11-03 NOTE — H&P
[facility]  Obstetrics  History & Physical    Patient Name: Tawanna Harp  MRN: [id number]  Admission Date: 11/3/2018  Primary Care Provider: Primary Doctor No    Subjective:     Principal Problem: Normal labor     History of Present Illness:  Presents with C/O CTX    Obstetric HPI:  Patient reports  contractions, active fetal movement, No vaginal bleeding , No loss of fluid     This pregnancy has been complicated by none.  Post dates EFW at 39 weeks 77% (8# 8oz)    Obstetric History       T0      L0     SAB2   TAB0   Ectopic0   Multiple0   Live Births0       # Outcome Date GA Lbr Jraad/2nd Weight Sex Delivery Anes PTL Lv   3 Current            2 SAB            1 SAB                 Past Medical History:   Diagnosis Date    Arthritis     neck     Past Surgical History:   Procedure Laterality Date    DILATION AND CURETTAGE OF UTERUS      [id number] N/A 2018    Performed by [facility] at [facility]    LAPAROSCOPY-DIAGNOSTIC  2018    [facility] [place]    SHAVING-ENDOMETRIAL  2018    Performed by [facility] at [facility] [place]       [facility] Medications   Medication Sig    evening primrose oil (EVENING PRIMROSE) 500 mg Cap Take by mouth.    fish oil-omega-3 fatty acids 300-1,000 mg capsule Take by mouth once daily.    Lactobacillus rhamnosus GG (CULTURELLE) 10 billion cell capsule Take 1 capsule by mouth once daily.    PRENATAL VIT CALC,IRON,FOLIC (PRENATAL VITAMIN ORAL) Take by mouth once daily.    raspberry flavor (RASPBERRY ORAL) Take by mouth.       Review of patient's allergies indicates:  No Known Allergies     Family History     Problem Relation (Age of Onset)    Cancer Sister    Diabetes Paternal Grandmother        Tobacco Use    Smoking status: Never Smoker    Smokeless tobacco: Never Used   Substance and Sexual Activity    Alcohol use: No     Comment: cooks with it    Drug use:  No    Sexual activity: Yes     Partners: Male     Review of Systems   Genitourinary: Positive for pelvic pain.   All other systems reviewed and are negative.     Objective:     Vital Signs (Most Recent):  Temp: 98.2 °F (36.8 °C) (18 1003)  Pulse: 80 (18 0900)  Resp: 18 (18 0900)  BP: 134/86 (18 0900) Vital Signs (24h Range):  Temp:  [98.2 °F (36.8 °C)] 98.2 °F (36.8 °C)  Pulse:  [80] 80  Resp:  [18] 18  BP: (134)/(86) 134/86     Weight: 87.5 kg (193 lb)  Body mass index is 28.5 kg/m².    FHT: Cat 1 (reassuring)  TOCO:  Q 2-3 minutes    Physical Exam:   Constitutional: She is oriented to person, place, and time. She appears well-developed and well-nourished.        Pulmonary/Chest: Effort normal.        Abdominal: Soft.     Genitourinary: Vagina normal and uterus normal.           Musculoskeletal: Normal range of motion and moves all extremeties.       Neurological: She is alert and oriented to person, place, and time.    Skin: Skin is dry.    Psychiatric: She has a normal mood and affect. Her behavior is normal.   Patient plans NCB and has a     Cervix:  Dilation:  4  Effacement:  75%  Station: -2  Presentation: Vertex     Significant Labs:  Lab Results   Component Value Date    GROUPTRH O POS 2018    HEPBSAG Negative 2018    STREPBCULT No Group B Streptococcus isolated 10/05/2018       I have personallly reviewed all pertinent lab results from the last 24 hours.    Assessment/Plan:     28 y.o. female  at 41w4d for:    Normal labor    Admit for labor management  Plans NCB and has a          Erika Ferreira CNM  Obstetrics  [facility]

## 2018-11-03 NOTE — SUBJECTIVE & OBJECTIVE
Obstetric HPI:  Patient reports  contractions, active fetal movement, No vaginal bleeding , No loss of fluid     This pregnancy has been complicated by none.  Post dates EFW at 39 weeks 77% (8# 8oz)    Obstetric History       T0      L0     SAB2   TAB0   Ectopic0   Multiple0   Live Births0       # Outcome Date GA Lbr Jarad/2nd Weight Sex Delivery Anes PTL Lv   3 Current            2 SAB            1 SAB                 Past Medical History:   Diagnosis Date    Arthritis     neck     Past Surgical History:   Procedure Laterality Date    DILATION AND CURETTAGE OF UTERUS      [id number]-NBCIVHYRH N/A 2018    Performed by Darlene Emerson MD at [facility] OR    LAPAROSCOPY-DIAGNOSTIC  2018    Performed by [facility]ene Emerson [facility] at [facility] OR    SHAVING-ENDOMETRIAL  2018    Performed by [facility] Emerson [facility] at [facility] OR       [facility]   Medication Sig    evening primrose oil (EVENING PRIMROSE) 500 mg Cap Take by mouth.    fish oil-omega-3 fatty acids 300-1,000 mg capsule Take by mouth once daily.    Lactobacillus rhamnosus GG (CULTURELLE) 10 billion cell capsule Take 1 capsule by mouth once daily.    PRENATAL VIT CALC,IRON,FOLIC (PRENATAL VITAMIN ORAL) Take by mouth once daily.    raspberry flavor (RASPBERRY ORAL) Take by mouth.       Review of patient's allergies indicates:  No Known Allergies     Family History     Problem Relation (Age of Onset)    Cancer Sister    Diabetes Paternal Grandmother        Tobacco Use    Smoking status: Never Smoker    Smokeless tobacco: Never Used   Substance and Sexual Activity    Alcohol use: No     Comment: cooks with it    Drug use: No    Sexual activity: Yes     Partners: Male     Review of Systems   Genitourinary: Positive for pelvic pain.   All other systems reviewed and are negative.     Objective:     Vital Signs (Most Recent):  Temp: 98.2 °F (36.8 °C) (18 1003)  Pulse: 80 (18 0900)  Resp: 18 (18  0900)  BP: 134/86 (11/03/18 0900) Vital Signs (24h Range):  Temp:  [98.2 °F (36.8 °C)] 98.2 °F (36.8 °C)  Pulse:  [80] 80  Resp:  [18] 18  BP: (134)/(86) 134/86     Weight: 87.5 kg (193 lb)  Body mass index is 28.5 kg/m².    FHT: Cat 1 (reassuring)  TOCO:  Q 2-3 minutes    Physical Exam:   Constitutional: She is oriented to person, place, and time. She appears well-developed and well-nourished.        Pulmonary/Chest: Effort normal.        Abdominal: Soft.     Genitourinary: Vagina normal and uterus normal.           Musculoskeletal: Normal range of motion and moves all extremeties.       Neurological: She is alert and oriented to person, place, and time.    Skin: Skin is dry.    Psychiatric: She has a normal mood and affect. Her behavior is normal.   Patient plans NCB and has a     Cervix:  Dilation:  4  Effacement:  75%  Station: -2  Presentation: Vertex     Significant Labs:  Lab Results   Component Value Date    GROUPTRH O POS 04/13/2018    HEPBSAG Negative 04/13/2018    STREPBCULT No Group B Streptococcus isolated 10/05/2018       I have personallly reviewed all pertinent lab results from the last 24 hours.

## 2018-11-03 NOTE — PROGRESS NOTES
"S: Feeling urge to push  O:  VS reviewed, afebrile   Vitals:    11/03/18 1003 11/03/18 1050 11/03/18 1200 11/03/18 1400   BP:  129/84 133/84 126/85   Pulse:  77 79 85   Resp:       Temp: 98.2 °F (36.8 °C)      TempSrc: Oral      Weight: 87.5 kg (193 lb)      Height: 5' 9" (1.753 m)          FHTs  reassuring per spot checks  UC q2-3  SVE Anterior lip, V,+1    A: IUP @ 41w4d ;     Patient Active Problem List   Diagnosis    Recurrent pregnancy loss with current pregnancy    Septate uterus    Supervision of normal pregnancy    Post term pregnancy over 40 weeks    Normal labor       P:   Continue close monitoring of maternal/fetal status  Will begin pushing when patient desires  "

## 2018-11-03 NOTE — HOSPITAL COURSE
11/3/18 0930 admit for labor 4+/80  11/3/18  @ 1739, NCB, manual removal of placenta, velamentous cord insertion, ancef 1 gm x's 1 dose, second degree vag floor laceration.  Routine PP care  2018  0900 Routine pp care  18 discharge home

## 2018-11-04 LAB
BASOPHILS # BLD AUTO: 0.02 K/UL
BASOPHILS NFR BLD: 0.1 %
DIFFERENTIAL METHOD: ABNORMAL
EOSINOPHIL # BLD AUTO: 0.1 K/UL
EOSINOPHIL NFR BLD: 0.4 %
ERYTHROCYTE [DISTWIDTH] IN BLOOD BY AUTOMATED COUNT: 13.5 %
HCT VFR BLD AUTO: 31.8 %
HGB BLD-MCNC: 10.8 G/DL
LYMPHOCYTES # BLD AUTO: 2.6 K/UL
LYMPHOCYTES NFR BLD: 16.3 %
MCH RBC QN AUTO: 29.8 PG
MCHC RBC AUTO-ENTMCNC: 34 G/DL
MCV RBC AUTO: 88 FL
MONOCYTES # BLD AUTO: 1.4 K/UL
MONOCYTES NFR BLD: 8.9 %
NEUTROPHILS # BLD AUTO: 11.9 K/UL
NEUTROPHILS NFR BLD: 74.3 %
PLATELET # BLD AUTO: 172 K/UL
PMV BLD AUTO: 10.2 FL
RBC # BLD AUTO: 3.63 M/UL
WBC # BLD AUTO: 16.01 K/UL

## 2018-11-04 PROCEDURE — 85025 COMPLETE CBC W/AUTO DIFF WBC: CPT

## 2018-11-04 PROCEDURE — 59400 OBSTETRICAL CARE: CPT | Mod: ,,, | Performed by: ADVANCED PRACTICE MIDWIFE

## 2018-11-04 PROCEDURE — 11000001 HC ACUTE MED/SURG PRIVATE ROOM

## 2018-11-04 PROCEDURE — 36415 COLL VENOUS BLD VENIPUNCTURE: CPT

## 2018-11-04 PROCEDURE — 25000003 PHARM REV CODE 250: Performed by: ADVANCED PRACTICE MIDWIFE

## 2018-11-04 RX ADMIN — IBUPROFEN 600 MG: 600 TABLET ORAL at 05:11

## 2018-11-04 RX ADMIN — IBUPROFEN 600 MG: 600 TABLET ORAL at 11:11

## 2018-11-04 RX ADMIN — IBUPROFEN 600 MG: 600 TABLET ORAL at 12:11

## 2018-11-04 NOTE — LACTATION NOTE
"Lactation called to room for feeding assistance. Infant awake, showing feeding cues. Observed mother position infant to right breast in cross cradle hold. Mother independently latched infant, adequate latch noted, mother cried out in pain with initial latch, reports pain 7/10 that resolves to 5/10 once latch established. Infant with spontaneous swallows. Discussed importance of deep latch to facilitate milk transfer and prevent nipple injury. Mother states there "may have been a feeding a few feeds ago" where the latch was not as deep and has been experiencing pain since. Mother agreeable to latch adjustment at this time. Reviewed deep latch technique, assisted with sandwich hold, infant latched deeper to breast. Mother reports pain somewhat improved with deeper latch. Infant with audible swallows but remains sleepy throughout feeding, requiring stimulation to continue sucking. Latch now shallow, assisted mother to identify shallow latch and mother removed infant from breast. Nipple slightly compressed upon unlatching. Instructed regarding hand expression and nipple care, mother return demonstrated. Infant then positioned to left breast in football hold. Mother independently latched infant deeply to the breast, reports pain 3/10 that does not resolve throughout feeding. Infant with a few audible swallows noted, sleepy at breast, mother reports occasional biting sensation, some chin quivering noted. Infant now sleeping but remains latched. Mother removed infant from breast, nipple slightly compressed. Hand expression and nipple care performed.   Reviewed expected  behaviors and output for first 48 hours of life.  Plan: Frequent skin to skin, supervised tummy time, suck training before latch, alternate positions with feedings. Notify lactation if pain gets worse, consider hand expressing and syringe feeding if necessary.      18 1245   Maternal Infant Assessment   Areola Bilateral:;elastic   Nipple(s) " "Bilateral:;everted;graspable   Nipple Symptoms bilateral:;redness;tender   LATCH Score   Latch 2-->grasps breast, tongue down, lips flanged, rhythmic sucking   Audible Swallowing 1-->a few with stimulation   Type Of Nipple 2-->everted (after stimulation)   Comfort (Breast/Nipple) 1-->filling, red/small blisters/bruises, mild/mod discomfort   Hold (Positioning) 1-->minimal assist, teach one side: mother does other, staff holds   Score (less than 7 for 2/more consecutive times, consult Lactation Consultant) 7   Maternal Infant Feeding   Infant Positioning cross-cradle;clutch/"football"   Signs of Milk Transfer audible swallow;infant jaw motion present   Presence of Pain yes   Pain Location nipples, bilateral   Comfort Measures Following Feeding expressed milk applied   Nipple Shape After Feeding, Left slight compression   Nipple Shape After Feeding, Right sl compression   Feeding Infant   Satiety Cues sleeping after feeding   Effective Latch During Feeding yes   Audible Swallow yes   Lactation Interventions   Attachment Promotion breastfeeding assistance provided;counseling provided;family involvement promoted;privacy provided;rooming-in promoted;skin-to-skin contact encouraged   Breastfeeding Assistance assisted with positioning;both breasts offered each feeding;feeding cue recognition promoted;feeding on demand promoted;feeding session observed;infant latch-on verified;infant suck/swallow verified;support offered   Maternal Breastfeeding Support encouragement offered;infant-mother separation minimized;lactation counseling provided;maternal hydration promoted;maternal nutrition promoted;maternal rest encouraged   Latch Promotion positioning assisted;infant moved to breast     "

## 2018-11-04 NOTE — PLAN OF CARE
Problem: Patient Care Overview  Goal: Plan of Care Review  Outcome: Ongoing (interventions implemented as appropriate)  Pt progressing well bonding with infant. Fundus firm without massage with light bleeding. Pain controlled with po medication. VSS. Safety maintained. Will continue to monitor.

## 2018-11-04 NOTE — SUBJECTIVE & OBJECTIVE
Hospital course: 11/3/18 0930 admit for labor 4+/80  11/3/18  @ 1739, NCB, manual removal of placenta, velamentous cord insertion, ancef 1 gm x's 1 dose, second degree vag floor laceration.  Routine PP care  2018  0900 Routine pp care           She is doing well this morning. She is tolerating a regular diet without nausea or vomiting. She is voiding spontaneously. She is ambulating.  Vaginal bleeding is mild. She denies fever or chills. Abdominal pain is mild and controlled with oral medications. She is breastfeeding  Objective:     Vital Signs (Most Recent):  Temp: 98.3 °F (36.8 °C) (18 0800)  Pulse: 80 (18 08)  Resp: 18 (18 08)  BP: 130/72 (18 08) Vital Signs (24h Range):  Temp:  [97.4 °F (36.3 °C)-99.9 °F (37.7 °C)] 98.3 °F (36.8 °C)  Pulse:  [66-92] 80  Resp:  [18] 18  BP: (105-136)/(59-86) 130/72     Weight: 87.5 kg (193 lb)  Body mass index is 28.5 kg/m².      Intake/Output Summary (Last 24 hours) at 2018 1057  Last data filed at 2018 0125  Gross per 24 hour   Intake --   Output 1150 ml   Net -1150 ml       Significant Labs:  Lab Results   Component Value Date    GROUPTRH O POS 2018    HEPBSAG Negative 2018    STREPBCULT No Group B Streptococcus isolated 10/05/2018     Recent Labs   Lab 18  0506   HGB 10.8*   HCT 31.8*       I have personallly reviewed all pertinent lab results from the last 24 hours.    Physical Exam:   Constitutional: She is oriented to person, place, and time. She appears well-developed and well-nourished.             Abdominal: Soft.                 Neurological: She is alert and oriented to person, place, and time.    Skin: Skin is warm and dry.    Psychiatric: She has a normal mood and affect.

## 2018-11-04 NOTE — PROGRESS NOTES
[facility] -   Obstetrics  Postpartum Progress Note    Patient Name: Tawanna Harp  MRN: [id number]  Admission Date: 11/3/2018  Hospital Length of Stay: 1 days  Attending Physician: Darlene Emerson, *  Primary Care Provider: Primary Doctor No    Subjective:     Principal Problem:Normal labor    Hospital course: 11/3/18 0930 admit for labor 4+/80  11/3/18  @ 1739, NCB, manual removal of placenta, velamentous cord insertion, ancef 1 gm x's 1 dose, second degree vag floor laceration.  Routine PP care  2018  0900 Routine pp care           She is doing well this morning. She is tolerating a regular diet without nausea or vomiting. She is voiding spontaneously. She is ambulating.  Vaginal bleeding is mild. She denies fever or chills. Abdominal pain is mild and controlled with oral medications. She is breastfeeding  Objective:     Vital Signs (Most Recent):  Temp: 98.3 °F (36.8 °C) (18 0800)  Pulse: 80 (18 0800)  Resp: 18 (18 0800)  BP: 130/72 (18 0800) Vital Signs (24h Range):  Temp:  [97.4 °F (36.3 °C)-99.9 °F (37.7 °C)] 98.3 °F (36.8 °C)  Pulse:  [66-92] 80  Resp:  [18] 18  BP: (105-136)/(59-86) 130/72     Weight: 87.5 kg (193 lb)  Body mass index is 28.5 kg/m².      Intake/Output Summary (Last 24 hours) at 2018 1057  Last data filed at 2018 0125  Gross per 24 hour   Intake --   Output 1150 ml   Net -1150 ml       Significant Labs:  Lab Results   Component Value Date    GROUPTRH O POS 2018    HEPBSAG Negative 2018    STREPBCULT No Group B Streptococcus isolated 10/05/2018     Recent Labs   Lab 18  0506   HGB 10.8*   HCT 31.8*       I have personallly reviewed all pertinent lab results from the last 24 hours.    Physical Exam:   Constitutional: She is oriented to person, place, and time. She appears well-developed and well-nourished.             Abdominal: Soft.                 Neurological: She is alert and oriented to person,  place, and time.    Skin: Skin is warm and dry.    Psychiatric: She has a normal mood and affect.       Assessment/Plan:     28 y.o. female  for:    Vaginal laceration    Routine rudolph care     Normal vaginal delivery    Routine pp care         Disposition: As patient meets milestones, will plan to discharge tomorrow.    Francie Chen CNM  Obstetrics  [facility]

## 2018-11-04 NOTE — NURSING
Pt assisted to RR, denies weakness or dizziness. Instructed pt on use of rudolph bottle. Perineum cleansed with rudolph bottle. New pad, underwear and gown given to pt. New green pads placed on bed.

## 2018-11-04 NOTE — LACTATION NOTE
Lactation Rounds:     Visited mother at bedside. Infant was being held by a family member and was fussy. Assisted mother to a comfortable position with pillow support, and then positioned infant in cross-cradle hold on left breast. Asymmetric technique verbally reviewed and then demonstrated with mother's permission. Mother required some assistance with latch at this feeding due to IV and BP cuff. Infant grasped breast and began to suckle rhythmically with good jaw movement noted. Mother described a pulling sensation that was not painful. Discussed signs of good latch and milk transfer. Admit teaching done, including feeding on demand, recognition of feeding cues, expectations for infant feeding/behavior in first day of life, importance of skin to skin contact, risks of pacifier use. Hand expression briefly reviewed but not demonstrated on patient at this time.  and family members present at bedside for support. Mother was encouraged to call for lactation assistance as needed.        11/03/18 1915   Infant Information   Infant's Name Rand   Maternal Infant Assessment   Breast Shape Left:;round   Breast Density Left:;soft   Areola Left:;elastic   Nipple(s) Left:;everted;other (see comments)  (short)   LATCH Score   Latch 2-->grasps breast, tongue down, lips flanged, rhythmic sucking   Audible Swallowing 0-->none   Type Of Nipple 2-->everted (after stimulation)   Comfort (Breast/Nipple) 2-->soft/nontender   Hold (Positioning) 1-->minimal assist, teach one side: mother does other, staff holds   Score (less than 7 for 2/more consecutive times, consult Lactation Consultant) 7   Lactation Interventions   Attachment Promotion breastfeeding assistance provided;counseling provided;face-to-face positioning promoted;skin-to-skin contact encouraged   Breastfeeding Assistance assisted with positioning;feeding cue recognition promoted;feeding on demand promoted;infant latch-on verified;infant suck/swallow verified;support  offered   Maternal Breastfeeding Support encouragement offered;lactation counseling provided

## 2018-11-05 VITALS
TEMPERATURE: 98 F | BODY MASS INDEX: 28.58 KG/M2 | HEIGHT: 69 IN | WEIGHT: 193 LBS | RESPIRATION RATE: 20 BRPM | SYSTOLIC BLOOD PRESSURE: 145 MMHG | HEART RATE: 87 BPM | DIASTOLIC BLOOD PRESSURE: 67 MMHG

## 2018-11-05 PROCEDURE — 99238 HOSP IP/OBS DSCHRG MGMT 30/<: CPT | Mod: ,,, | Performed by: ADVANCED PRACTICE MIDWIFE

## 2018-11-05 PROCEDURE — 25000003 PHARM REV CODE 250: Performed by: ADVANCED PRACTICE MIDWIFE

## 2018-11-05 RX ORDER — IBUPROFEN 600 MG/1
600 TABLET ORAL EVERY 6 HOURS
Qty: 30 TABLET | Refills: 1 | Status: SHIPPED | OUTPATIENT
Start: 2018-11-05 | End: 2018-12-14

## 2018-11-05 RX ADMIN — IBUPROFEN 600 MG: 600 TABLET ORAL at 12:11

## 2018-11-05 RX ADMIN — IBUPROFEN 600 MG: 600 TABLET ORAL at 06:11

## 2018-11-05 NOTE — DISCHARGE INSTRUCTIONS

## 2018-11-05 NOTE — PROGRESS NOTES
[facility] -   Obstetrics  Postpartum Progress Note    Patient Name: Tawanna Harp  MRN: [id number]  Admission Date: 11/3/2018  Hospital Length of Stay: 2 days  Attending Physician: Darlene Emerson, *  Primary Care Provider: Primary Doctor No    Subjective:     Principal Problem:Normal labor    Hospital course: 11/3/18 0930 admit for labor 4+/80  11/3/18  @ 1739, NCB, manual removal of placenta, velamentous cord insertion, ancef 1 gm x's 1 dose, second degree vag floor laceration.  Routine PP care  2018  0900 Routine pp care  18 discharge home    Interval History:     She is doing well this morning. She is tolerating a regular diet without nausea or vomiting. She is voiding spontaneously. She is ambulating. She has passed flatus, and has a BM. Vaginal bleeding is mild. She denies fever or chills. Abdominal pain is mild and controlled with oral medications. She is breastfeeding. She desires circumcision for her male baby: not applicable.    Objective:     Vital Signs (Most Recent):  Temp: 99.1 °F (37.3 °C) (18 0800)  Pulse: 75 (18 0800)  Resp: 18 (18 0800)  BP: 138/84 (18 0800) Vital Signs (24h Range):  Temp:  [97.8 °F (36.6 °C)-99.1 °F (37.3 °C)] 99.1 °F (37.3 °C)  Pulse:  [71-76] 75  Resp:  [18] 18  BP: (119-138)/(72-85) 138/84     Weight: 87.5 kg (193 lb)  Body mass index is 28.5 kg/m².    No intake or output data in the 24 hours ending 18 1044    Significant Labs:  Lab Results   Component Value Date    GROUPTRH O POS 2018    HEPBSAG Negative 2018    STREPBCULT No Group B Streptococcus isolated 10/05/2018     Recent Labs   Lab 18  0506   HGB 10.8*   HCT 31.8*       I have personallly reviewed all pertinent lab results from the last 24 hours.    Physical Exam:   Constitutional: She is oriented to person, place, and time. She appears well-developed and well-nourished.     Eyes: Conjunctivae are normal. Pupils are equal,  round, and reactive to light.    Neck: Normal range of motion.    Cardiovascular: Normal rate and regular rhythm.     Pulmonary/Chest: Breath sounds normal.        Abdominal: Soft.     Genitourinary: Vagina normal and uterus normal.           Musculoskeletal: Normal range of motion and moves all extremeties.       Neurological: She is alert and oriented to person, place, and time.    Skin: Skin is warm.    Psychiatric: She has a normal mood and affect. Her behavior is normal. Thought content normal.       Assessment/Plan:     28 y.o. female  for:    Vaginal laceration    Routine rudolph care     Normal vaginal delivery    Routine pp care  Discharge home         Disposition: As patient meets milestones, will plan to discharge today    Lisbeth Waggoner CNM  Obstetrics  [facility]

## 2018-11-05 NOTE — DISCHARGE SUMMARY
[facility] -   Obstetrics  Discharge Summary      Patient Name: Tawanna Harp  MRN: [id number]  Admission Date: 11/3/2018  Hospital Length of Stay: 2 days  Discharge Date and Time:  2018 10:56 AM  Attending Physician: Darlene Emerson, *   Discharging Provider: Lisbeth Waggoner CNM  Primary Care Provider: Primary Doctor No    HPI: Presents with C/O CTX    * No surgery found *     Hospital Course:   11/3/18 0930 admit for labor 4+/80  11/3/18  @ 1739, NCB, manual removal of placenta, velamentous cord insertion, ancef 1 gm x's 1 dose, second degree vag floor laceration.  Routine PP care  2018  0900 Routine pp care  18 discharge home    Consults (From admission, onward)        Status Ordering Provider     Inpatient consult to Anesthesiology  Once     Provider:  (Not yet assigned)    JONNATHAN Hidalgo          Final Active Diagnoses:    Diagnosis Date Noted POA    PRINCIPAL PROBLEM:  Normal vaginal delivery [O80] 2018 Not Applicable    Vaginal laceration [S31.41XA] 2018 No    Normal  (single liveborn) [Z38.2] 2018 No      Problems Resolved During this Admission:    Diagnosis Date Noted Date Resolved POA    Normal labor [O80, Z37.9] 2018 Not Applicable    Vaginal delivery [O80] 2018 Not Applicable        Labs:   CBC   Recent Labs   Lab 18  0506   WBC 16.01*   HGB 10.8*   HCT 31.8*          Feeding Method: breast    Immunizations     Date Immunization Status Dose Route/Site Given by    18 MMR Incomplete 0.5 mL Subcutaneous/Left deltoid     18 Tdap Incomplete 0.5 mL Intramuscular/Left deltoid           Delivery:    Episiotomy: None   Lacerations:     Repair suture:     Repair # of packets: 1   Blood loss (ml): 200     Birth information:  YOB: 2019   Time of birth: 5:39 PM   Sex: female   Delivery type: Vaginal, Spontaneous   Gestational Age:  41w4d    Delivery Clinician:      Other providers:       Additional  information:  Forceps:    Vacuum:    Breech:    Observed anomalies      Living?:           APGARS  One minute Five minutes Ten minutes   Skin color:         Heart rate:         Grimace:         Muscle tone:         Breathing:         Totals: 9  9        Placenta: Delivered:       appearance    Pending Diagnostic Studies:     None          Discharged Condition: good    Disposition:     Follow Up:    Patient Instructions:   No discharge procedures on file.  Medications:  Current Discharge Medication List      START taking these medications    Details   ibuprofen (ADVIL,MOTRIN) 600 MG tablet Take 1 tablet (600 mg total) by mouth every 6 (six) hours.  Qty: 30 tablet, Refills: 1         CONTINUE these medications which have NOT CHANGED    Details   PRENATAL VIT CALC,IRON,FOLIC (PRENATAL VITAMIN ORAL) Take by mouth once daily.         STOP taking these medications       evening primrose oil (EVENING PRIMROSE) 500 mg Cap Comments:   Reason for Stopping:         fish oil-omega-3 fatty acids 300-1,000 mg capsule Comments:   Reason for Stopping:         Lactobacillus rhamnosus GG (CULTURELLE) 10 billion cell capsule Comments:   Reason for Stopping:         raspberry flavor (RASPBERRY ORAL) Comments:   Reason for Stopping:               Lisbeth Waggoner CNM  Obstetrics  [facility]

## 2018-11-05 NOTE — SUBJECTIVE & OBJECTIVE
Hospital course: 11/3/18 0930 admit for labor 4+/80  11/3/18  @ 1739, NCB, manual removal of placenta, velamentous cord insertion, ancef 1 gm x's 1 dose, second degree vag floor laceration.  Routine PP care  2018  0900 Routine pp care  18 discharge home    Interval History:     She is doing well this morning. She is tolerating a regular diet without nausea or vomiting. She is voiding spontaneously. She is ambulating. She has passed flatus, and has a BM. Vaginal bleeding is mild. She denies fever or chills. Abdominal pain is mild and controlled with oral medications. She is breastfeeding. She desires circumcision for her male baby: not applicable.    Objective:     Vital Signs (Most Recent):  Temp: 99.1 °F (37.3 °C) (18 0800)  Pulse: 75 (18 0800)  Resp: 18 (18 0800)  BP: 138/84 (18 0800) Vital Signs (24h Range):  Temp:  [97.8 °F (36.6 °C)-99.1 °F (37.3 °C)] 99.1 °F (37.3 °C)  Pulse:  [71-76] 75  Resp:  [18] 18  BP: (119-138)/(72-85) 138/84     Weight: 87.5 kg (193 lb)  Body mass index is 28.5 kg/m².    No intake or output data in the 24 hours ending 18 1044    Significant Labs:  Lab Results   Component Value Date    GROUPTRH O POS 2018    HEPBSAG Negative 2018    STREPBCULT No Group B Streptococcus isolated 10/05/2018     Recent Labs   Lab 18  0506   HGB 10.8*   HCT 31.8*       I have personallly reviewed all pertinent lab results from the last 24 hours.    Physical Exam:   Constitutional: She is oriented to person, place, and time. She appears well-developed and well-nourished.     Eyes: Conjunctivae are normal. Pupils are equal, round, and reactive to light.    Neck: Normal range of motion.    Cardiovascular: Normal rate and regular rhythm.     Pulmonary/Chest: Breath sounds normal.        Abdominal: Soft.     Genitourinary: Vagina normal and uterus normal.           Musculoskeletal: Normal range of motion and moves all extremeties.       Neurological:  She is alert and oriented to person, place, and time.    Skin: Skin is warm.    Psychiatric: She has a normal mood and affect. Her behavior is normal. Thought content normal.

## 2018-11-05 NOTE — NURSING
"Called to room by patient for latch-on verification and breastfeeding assistance. Latch appears to be good but mother complains of pinching and nipple soreness. Instructed the mother to break the latch by breaking the seal. Left nipple is red and appears to have a small blister. We switched over to the right breast and infant appears to have a good latch. Mother reports discomfort but states that it gets better as the infant continues to suck. Mother becomes visibly upset and starts to cry and states that "she is crying so much." I told her that she is doing a great job and that the baby is most likely cluster feeding. Lanolin cream provided and I instructed that she use warm compresses to the breast when she is not breastfeeding for comfort. I also informed her that expressing breast milk and rubbing it into the nipples can also be effective. I offered encouragement and support. Will continue to monitor.  "

## 2018-11-05 NOTE — NURSING
VSS, fundus firm without massage, bleeding light. Discharge instructions reviewed with pt and she verbalizes understanding. Follow up appt made for pt and she verbalizes understanding of time date and place of appt. AVS handout given. Appears to be bonding well with infant. Discharged to car via wheelchair with baby in lap by staff.

## 2018-11-05 NOTE — LACTATION NOTE
Lactation Rounds: Infant output and weight loss WNL. Mother has infant to the right breast in the cross cradle position upon entering room. Latch appears deep, swallows audible, nutritive sucking pattern noted; bottom lip manually un flanged, mother able to return demonstrate. Mother reports yesterday evening latch pain increased to 7/10; current pain level 4/10m unable to describe. Infant feeds until content, nipple with slight compression and blanching upon unlatching. Hand expression reviewed and nipple care discussed as well as preformed.    Oral assessment reveals tight & tick band of tissue that attached upper lip to gumline that blanches with gentle eversion. Speed bump felt with finger sweep under tongue; tight band of tissue noted under tongue. Limited range of motion to tongue tip with lateralization. Discussed with parents normal tongue mobility and current mobility in infant. Resources given and encouraged mother to discuss with pediatrician. Discussed that if latching became to painful, mother could pump breast milk and feed infant. Encouraged mother to stay in close contact with lactation department after discharge.    Mother anticipates discharge home today. Lactation discharge information reviewed.  Mother is aware of warm line, and outpatient consultations and monthly support gatherings. Encouraged mother to contact lactation with any questions, concerns, or problems. Contact numbers provided, and mother verbalizes understanding.       11/05/18 1000   Maternal Infant Assessment   Breast Shape Bilateral:;wide  (slighlt widly spaced and angled breast)   Breast Density Bilateral:;soft   Areola Bilateral:;elastic   Nipple(s) Bilateral:;everted   Nipple Symptoms bilateral:;painful   Infant Assessment   Weight Loss (%) 5.8   Tongue/Frenulum Symptoms frenulum tight   Sucking Reflex present   Rooting Reflex present   Swallow Reflex present   Number of Stools (24 hours) 3   Number of Voids (24 hours) 5    LATCH Score   Latch 2-->grasps breast, tongue down, lips flanged, rhythmic sucking   Audible Swallowing 2-->spontaneous and intermittent (24 hrs old)   Type Of Nipple 2-->everted (after stimulation)   Comfort (Breast/Nipple) 1-->filling, red/small blisters/bruises, mild/mod discomfort   Hold (Positioning) 2-->no assist from staff, mother able to position/hold infant   Score (less than 7 for 2/more consecutive times, consult Lactation Consultant) 9   Maternal Infant Feeding   Maternal Emotional State relaxed;independent   Infant Positioning cross-cradle  (right and left breast)   Signs of Milk Transfer audible swallow;infant jaw motion present   Presence of Pain yes   Pain Location nipples, bilateral   Pain Description other (see comments)  (mother unable to describe)   Comfort Measures Following Feeding expressed milk applied;air-drying encouraged;other (see comments)  (warm compress per mothers request)   Nipple Shape After Feeding, Left slight compression   Nipple Shape After Feeding, Right slight compression   Latch Assistance no   Breastfeeding Education adequate infant intake;adequate milk volume;importance of skin-to-skin contact;milk expression, hand   Breastfeeding History   Breastfeeding History no   Feeding Infant   Effective Latch During Feeding yes   Audible Swallow yes   Lactation Referrals   Lactation Referrals pediatric care provider;outpatient lactation program   Lactation Interventions   Attachment Promotion breastfeeding assistance provided;family involvement promoted;infant-mother separation minimized;rooming-in promoted;skin-to-skin contact encouraged   Breast Care: Breastfeeding milk massaged towards nipple   Breastfeeding Assistance both breasts offered each feeding;feeding cue recognition promoted;feeding on demand promoted;infant suck/swallow verified;infant latch-on verified;support offered   Maternal Breastfeeding Support lactation counseling provided;encouragement offered

## 2018-11-09 ENCOUNTER — TELEPHONE (OUTPATIENT)
Dept: LACTATION | Facility: CLINIC | Age: 28
End: 2018-11-09

## 2018-12-14 ENCOUNTER — POSTPARTUM VISIT (OUTPATIENT)
Dept: OBSTETRICS AND GYNECOLOGY | Facility: CLINIC | Age: 28
End: 2018-12-14

## 2018-12-14 VITALS
BODY MASS INDEX: 24.88 KG/M2 | SYSTOLIC BLOOD PRESSURE: 110 MMHG | DIASTOLIC BLOOD PRESSURE: 72 MMHG | WEIGHT: 168 LBS | HEIGHT: 69 IN

## 2018-12-14 PROBLEM — O48.0 POST TERM PREGNANCY OVER 40 WEEKS: Status: RESOLVED | Noted: 2018-10-29 | Resolved: 2018-12-14

## 2018-12-14 PROBLEM — S31.41XA VAGINAL LACERATION: Status: RESOLVED | Noted: 2018-11-03 | Resolved: 2018-12-14

## 2018-12-14 PROCEDURE — 99999 PR PBB SHADOW E&M-EST. PATIENT-LVL II: CPT | Mod: PBBFAC,,, | Performed by: MIDWIFE

## 2018-12-14 PROCEDURE — 99212 OFFICE O/P EST SF 10 MIN: CPT | Mod: PBBFAC | Performed by: MIDWIFE

## 2018-12-14 PROCEDURE — 0503F POSTPARTUM CARE VISIT: CPT | Mod: ,,, | Performed by: MIDWIFE

## 2018-12-14 NOTE — PROGRESS NOTES
"Subjective:       Tawanna Harp is a 28 y.o. female who presents for a postpartum visit. She is 6 weeks postpartum following a spontaneous vaginal delivery. I have fully reviewed the prenatal and intrapartum course. The delivery was at 41 4/7 gestational weeks. Outcome: spontaneous vaginal delivery. Anesthesia: epidural. Postpartum course has been unremarkable. Baby's course has been unremarkable. Baby is feeding by breast. Bleeding no bleeding. Bowel function is normal. Bladder function is normal. Patient is not sexually active. Contraception method is abstinence. Postpartum depression screening: negative.    The following portions of the patient's history were reviewed and updated as appropriate: allergies, current medications, past family history, past medical history, past social history, past surgical history and problem list.    Review of Systems  Pertinent items are noted in HPI.     Objective:      /72   Ht 5' 9" (1.753 m)   Wt 76.2 kg (167 lb 15.9 oz)   LMP 12/26/2017 (Within Days)   Breastfeeding? Yes   BMI 24.81 kg/m²    General:  alert, appears stated age, cooperative and no distress    Vulva:  normal   Vagina: normal vagina, no discharge, exudate, lesion, or erythema          Assessment:       Routine postpartum exam. Pap smear not done at today's visit.     Plan:      1. Contraception: condoms  2. Follow up in: 1 year or as needed.   "

## 2019-11-01 ENCOUNTER — PATIENT MESSAGE (OUTPATIENT)
Dept: OBSTETRICS AND GYNECOLOGY | Facility: CLINIC | Age: 29
End: 2019-11-01

## 2019-11-13 ENCOUNTER — LAB VISIT (OUTPATIENT)
Dept: LAB | Facility: HOSPITAL | Age: 29
End: 2019-11-13
Attending: OBSTETRICS & GYNECOLOGY

## 2019-11-13 ENCOUNTER — PATIENT MESSAGE (OUTPATIENT)
Dept: OBSTETRICS AND GYNECOLOGY | Facility: CLINIC | Age: 29
End: 2019-11-13

## 2019-11-13 DIAGNOSIS — O20.0 THREATENED MISCARRIAGE IN EARLY PREGNANCY: Primary | ICD-10-CM

## 2019-11-13 DIAGNOSIS — O20.0 THREATENED MISCARRIAGE IN EARLY PREGNANCY: ICD-10-CM

## 2019-11-13 PROCEDURE — 84144 ASSAY OF PROGESTERONE: CPT

## 2019-11-13 PROCEDURE — 84702 CHORIONIC GONADOTROPIN TEST: CPT

## 2019-11-13 PROCEDURE — 36415 COLL VENOUS BLD VENIPUNCTURE: CPT | Mod: PO

## 2019-11-14 LAB
HCG INTACT+B SERPL-ACNC: NORMAL MIU/ML
PROGEST SERPL-MCNC: 16.8 NG/ML

## 2019-11-14 NOTE — TELEPHONE ENCOUNTER
Pt already had labs drawn and has been made aware that ultrasound orders will be placed when the provider see her at her upcoming visit. Pt verbalizes understanding. DS

## 2019-11-22 ENCOUNTER — PROCEDURE VISIT (OUTPATIENT)
Dept: OBSTETRICS AND GYNECOLOGY | Facility: CLINIC | Age: 29
End: 2019-11-22

## 2019-11-22 ENCOUNTER — OFFICE VISIT (OUTPATIENT)
Dept: OBSTETRICS AND GYNECOLOGY | Facility: CLINIC | Age: 29
End: 2019-11-22

## 2019-11-22 VITALS
BODY MASS INDEX: 22.24 KG/M2 | DIASTOLIC BLOOD PRESSURE: 64 MMHG | SYSTOLIC BLOOD PRESSURE: 110 MMHG | WEIGHT: 150.13 LBS | HEIGHT: 69 IN

## 2019-11-22 DIAGNOSIS — O26.841 UTERINE SIZE DATE DISCREPANCY, FIRST TRIMESTER: ICD-10-CM

## 2019-11-22 DIAGNOSIS — O20.9 VAGINAL BLEEDING AFFECTING EARLY PREGNANCY: ICD-10-CM

## 2019-11-22 DIAGNOSIS — Z32.01 PREGNANCY EXAMINATION OR TEST, POSITIVE RESULT: ICD-10-CM

## 2019-11-22 DIAGNOSIS — Z32.01 PREGNANCY EXAMINATION OR TEST, POSITIVE RESULT: Primary | ICD-10-CM

## 2019-11-22 PROCEDURE — 87491 CHLMYD TRACH DNA AMP PROBE: CPT

## 2019-11-22 PROCEDURE — 76801 OB US < 14 WKS SINGLE FETUS: CPT | Mod: 26,S$PBB,, | Performed by: OBSTETRICS & GYNECOLOGY

## 2019-11-22 PROCEDURE — 99999 PR PBB SHADOW E&M-EST. PATIENT-LVL III: CPT | Mod: PBBFAC,,, | Performed by: ADVANCED PRACTICE MIDWIFE

## 2019-11-22 PROCEDURE — 99213 OFFICE O/P EST LOW 20 MIN: CPT | Mod: S$PBB,,, | Performed by: ADVANCED PRACTICE MIDWIFE

## 2019-11-22 PROCEDURE — 99999 PR PBB SHADOW E&M-EST. PATIENT-LVL III: ICD-10-PCS | Mod: PBBFAC,,, | Performed by: ADVANCED PRACTICE MIDWIFE

## 2019-11-22 PROCEDURE — 99213 PR OFFICE/OUTPT VISIT, EST, LEVL III, 20-29 MIN: ICD-10-PCS | Mod: S$PBB,,, | Performed by: ADVANCED PRACTICE MIDWIFE

## 2019-11-22 PROCEDURE — 76801 PR US, OB <14WKS, TRANSABD, SINGLE GESTATION: ICD-10-PCS | Mod: 26,S$PBB,, | Performed by: OBSTETRICS & GYNECOLOGY

## 2019-11-22 PROCEDURE — 99213 OFFICE O/P EST LOW 20 MIN: CPT | Mod: PBBFAC,25 | Performed by: ADVANCED PRACTICE MIDWIFE

## 2019-11-22 PROCEDURE — 76801 OB US < 14 WKS SINGLE FETUS: CPT | Mod: PBBFAC | Performed by: OBSTETRICS & GYNECOLOGY

## 2019-11-22 RX ORDER — AMOXICILLIN 500 MG
CAPSULE ORAL DAILY
COMMUNITY
End: 2020-03-20

## 2019-11-23 NOTE — PROGRESS NOTES
Tawanna Harp is a 29 y.o. , presents today for amenorrhea.      C/C: amenorrhea  She has not seen any other provider for this pregnancy    HPI: Reports amenorrhea since Patient's last menstrual period was 2019 (exact date)..  She is not currently on any contraception.  Also reports nausea, no vomiting. Has noticed breast tenderness. vaginal bleeding last week, called and spoke with Dr. Emerson, HCG levels done. Will get US today    SOCIAL HISTORY: Denies emotional/mental/physical/sexual violence or abuse. Feels safe at home. Accompanied today by .     PAP HISTORY: last pap 2017, results-   WNL   denies any history of abnormal pap smear or STDs.     Last baby born with our practice, familiar with CNM/MD collaboration    Review of patient's allergies indicates:  No Known Allergies  Past Medical History:   Diagnosis Date    Arthritis     neck     Past Surgical History:   Procedure Laterality Date    DILATION AND CURETTAGE OF UTERUS       Past Surgical History:   Procedure Laterality Date    DILATION AND CURETTAGE OF UTERUS       OB History    Para Term  AB Living   3 1 1   2 1   SAB TAB Ectopic Multiple Live Births   2     0 1      # Outcome Date GA Lbr Jarad/2nd Weight Sex Delivery Anes PTL Lv   3 Term 18 41w4d  3.81 kg (8 lb 6.4 oz) F Vag-Spont None N SUNIL   2 SAB            1 SAB              OB History        3    Para   1    Term   1            AB   2    Living   1       SAB   2    TAB        Ectopic        Multiple   0    Live Births   1               Social History     Socioeconomic History    Marital status:      Spouse name: Not on file    Number of children: Not on file    Years of education: Not on file    Highest education level: Not on file   Occupational History    Not on file   Social Needs    Financial resource strain: Not on file    Food insecurity:     Worry: Not on file     Inability: Not on file    Transportation  "needs:     Medical: Not on file     Non-medical: Not on file   Tobacco Use    Smoking status: Never Smoker    Smokeless tobacco: Never Used   Substance and Sexual Activity    Alcohol use: No     Comment: cooks with it    Drug use: No    Sexual activity: Yes     Partners: Male   Lifestyle    Physical activity:     Days per week: Not on file     Minutes per session: Not on file    Stress: Not on file   Relationships    Social connections:     Talks on phone: Not on file     Gets together: Not on file     Attends [employer] service: Not on file     Active member of club or organization: Not on file     Attends meetings of clubs or organizations: Not on file     Relationship status: Not on file   Other Topics Concern    Not on file   Social History Narrative    Not on file     Family History   Problem Relation Age of Onset    Diabetes Paternal Grandmother     Cancer Sister      Social History     Substance and Sexual Activity   Sexual Activity Yes    Partners: Male         Primary Doctor No     ROS:   Constitutional/Gen: Denies fevers, chills, malaise, or weight loss. fatigue   Psych: Denies depression, anxiety  Eyes: Denies changes in vision or scotomata  Ears, nose, mouth, throat: Denies sinus tenderness, swelling, or dentition problems  CV/vasc: Denies heart palpitations or edema  Resp: Denies SOB or dyspnea  Breasts: Denies mass, nipple discharge, or trauma.  breast tenderness.  GI: Denies constipation, diarrhea, or vomiting. +nausea.  : Denies vaginal discharge, dysuria or pelvic pain. Denies urinary frequency  MS: Denies weakness, soreness, or changes in ROM    OBJECTIVE:  /64 (BP Location: Right arm)   Ht 5' 9" (1.753 m)   Wt 68.1 kg (150 lb 2.1 oz)   LMP 09/27/2019 (Exact Date)   BMI 22.17 kg/m²   Constitutional/Gen: NAD, appears stated age, neatly groomed  Neck: supple, no masses or enlargement  Head: normocephalic  Skin: warm and dry w/o rash  Lung: normal resp effort, CTAB  Heart: " normal HR, RRR   Breasts: bilaterally--no masses, tenderness, skin changes, or nipple discharge noted  Abdomen: soft, nontender, no masses, and bowel sounds normal, no enlargement  Pelvic deferred pap current, proven pelvis  Extremities: FROM, with no edema or tenderness.  Neurologic: A&O x 4, non-focal, cranial nerves 2-12 grossly intact  Psych: affect appropriate and without signs of mood, thought or memory difficulty appreciated    UPT + in office today    ASSESSMENT:  29 y.o. female  with amenorrhea  Likely at 8w0d via LMP DAHLIA 7/3/20 per LMP  Body mass index is 22.17 kg/m².  Patient Active Problem List   Diagnosis    Septate uterus       PLAN:  1. Amenorrhea  -- + UPT in office, Patient's last menstrual period was 2019 (exact date). --> Estimated Date of Delivery: 7/3/20  -- Dating US and secondary to first trimester bleeding- scheduled today  -- Routine serum and urine prenatal labs today      2. BMI 22  -- Discussed IOM recommended weight gain of:   Weight category Pre pre BMI  Recommended TWG   Underweight Less than 18.5 28-40    Normal Weight 18.5-24.9  25-35    Overweight 25-29.9  15-25    Obese   30 and greater  11-20   -- Discussed criteria for delivery at [facility] r/t excessive pre-preg weight or excessive weight gain:   Pre-pregnancy BMI over 40 or excess pregnancy weight gain defined as:   Pre-preg BMI < 18.5; Excess weight gain = > 60 pound   Pre-preg BMI 18.5-24.9;  Excess weight gain = > 53 pounds   Pre-preg BMI 25-29.9;  Excess weight gain = > 38 pounds   Pre-preg BMI > 30;  Excess weight gain = > 30 pounds    3. Discussed nausea and vomiting in pregnancy  -- Education regarding lifestyle and dietary modifications  -- Reviewed use of B6/Unisom prn. Pt will notify us if no relief/worsening symptoms, will consider alternative therapies prn    4. Pregnancy education and couseling; handouts and booklet provided  -- Familiar with our practice and anticipated prenatal course of care and how to  contact us  -- Precautions/warning signs reviewed  -- Common complaints of pregnancy  -- Routine prenatal labs including HIV  -- Ultrasounds  -- Childbirth education/hospital/ABC facilities  -- Nutrition, prepregnant BMI, and recommended weight gain  -- Toxoplasmosis precautions (Cats/Raw Meat)  -- Sexual activity and exercise  -- Environmental/Work hazards  -- Travel  -- Tobacco (Ask, Advise, Assess, Assist, and Arrange), as well as alcohol and drug use  -- Use of any medications (Including supplements, Vitamins, Herbs, or OTC Drugs)      6. Reviewed warning signs, precautions, and how/when to contact us.     7. RTC x 4 weeks, call or present sooner prn.     Updated Medication List:  Current Outpatient Medications   Medication Sig Dispense Refill    omega-3 fatty acids/fish oil (FISH OIL-OMEGA-3 FATTY ACIDS) 300-1,000 mg capsule Take by mouth once daily.      PRENATAL VIT CALC,IRON,FOLIC (PRENATAL VITAMIN ORAL) Take by mouth once daily.       No current facility-administered medications for this visit.

## 2019-11-25 LAB
C TRACH DNA SPEC QL NAA+PROBE: NOT DETECTED
N GONORRHOEA DNA SPEC QL NAA+PROBE: NOT DETECTED

## 2019-12-02 NOTE — PROGRESS NOTES
"Tawanna Harp is a 26 y.o. female  presents today for follow up of recent miscarriage.  Patient seen in ER 2 days ago with cramping and bleeding.  Pelvic ultrasound at that time showed 7 week embryo with no heart tones.  Bleeding is lighter today.  Cramping still present.  Blood type is O positive.      History reviewed. No pertinent past medical history.  History reviewed. No pertinent surgical history.  Family History   Problem Relation Age of Onset    Diabetes Paternal Grandmother     Cancer Sister      Social History   Substance Use Topics    Smoking status: Never Smoker    Smokeless tobacco: Never Used    Alcohol use None      Comment: cooks with it     OB History    Para Term  AB SAB TAB Ectopic Multiple Living   1    1 1          # Outcome Date GA Lbr Jarad/2nd Weight Sex Delivery Anes PTL Lv   1 SAB                   /60  Ht 5' 9" (1.753 m)  Wt 68.1 kg (150 lb 2.1 oz)  LMP 2016 (Exact Date)  Breastfeeding? No Comment: Miscarriage  BMI 22.17 kg/m2    ROS:  GENERAL: No fever, chills, fatigability or weight loss.  VULVAR: No pain, no lesions and no itching.  VAGINAL: No relaxation, no itching, no discharge, and no lesions.  ABDOMEN: No nausea. No vomiting. No diarrhea. No constipation    PE:  deferred    ASSESSMENT:    1. Complete miscarriage  CBC auto differential    TSH    hCG, quantitative           PLAN  Check HCG level today and follow until negative.  Patient and her  counseled regarding possible causes of miscarriage and timing for trying again for pregnancy.  She declines contraception.          " No

## 2019-12-18 ENCOUNTER — INITIAL PRENATAL (OUTPATIENT)
Dept: OBSTETRICS AND GYNECOLOGY | Facility: CLINIC | Age: 29
End: 2019-12-18

## 2019-12-18 ENCOUNTER — LAB VISIT (OUTPATIENT)
Dept: LAB | Facility: HOSPITAL | Age: 29
End: 2019-12-18
Attending: ADVANCED PRACTICE MIDWIFE

## 2019-12-18 VITALS
DIASTOLIC BLOOD PRESSURE: 78 MMHG | BODY MASS INDEX: 21.94 KG/M2 | SYSTOLIC BLOOD PRESSURE: 112 MMHG | WEIGHT: 148.56 LBS

## 2019-12-18 DIAGNOSIS — Z34.81 SUPERVISION OF NORMAL INTRAUTERINE PREGNANCY IN MULTIGRAVIDA IN FIRST TRIMESTER: ICD-10-CM

## 2019-12-18 DIAGNOSIS — Z32.01 PREGNANCY EXAMINATION OR TEST, POSITIVE RESULT: ICD-10-CM

## 2019-12-18 LAB
ABO + RH BLD: NORMAL
BASOPHILS # BLD AUTO: 0.03 K/UL (ref 0–0.2)
BASOPHILS NFR BLD: 0.4 % (ref 0–1.9)
BLD GP AB SCN CELLS X3 SERPL QL: NORMAL
DIFFERENTIAL METHOD: ABNORMAL
EOSINOPHIL # BLD AUTO: 0.1 K/UL (ref 0–0.5)
EOSINOPHIL NFR BLD: 1.1 % (ref 0–8)
ERYTHROCYTE [DISTWIDTH] IN BLOOD BY AUTOMATED COUNT: 12.1 % (ref 11.5–14.5)
HCT VFR BLD AUTO: 42.9 % (ref 37–48.5)
HGB BLD-MCNC: 13.6 G/DL (ref 12–16)
IMM GRANULOCYTES # BLD AUTO: 0.02 K/UL (ref 0–0.04)
IMM GRANULOCYTES NFR BLD AUTO: 0.3 % (ref 0–0.5)
LYMPHOCYTES # BLD AUTO: 1.6 K/UL (ref 1–4.8)
LYMPHOCYTES NFR BLD: 22.2 % (ref 18–48)
MCH RBC QN AUTO: 28.9 PG (ref 27–31)
MCHC RBC AUTO-ENTMCNC: 31.7 G/DL (ref 32–36)
MCV RBC AUTO: 91 FL (ref 82–98)
MONOCYTES # BLD AUTO: 0.4 K/UL (ref 0.3–1)
MONOCYTES NFR BLD: 4.9 % (ref 4–15)
NEUTROPHILS # BLD AUTO: 5.2 K/UL (ref 1.8–7.7)
NEUTROPHILS NFR BLD: 71.1 % (ref 38–73)
NRBC BLD-RTO: 0 /100 WBC
PLATELET # BLD AUTO: 291 K/UL (ref 150–350)
PMV BLD AUTO: 10.9 FL (ref 9.2–12.9)
RBC # BLD AUTO: 4.71 M/UL (ref 4–5.4)
WBC # BLD AUTO: 7.29 K/UL (ref 3.9–12.7)

## 2019-12-18 PROCEDURE — 0500F PR INITIAL PRENATAL CARE VISIT: ICD-10-PCS | Mod: S$PBB,,, | Performed by: ADVANCED PRACTICE MIDWIFE

## 2019-12-18 PROCEDURE — 99999 PR PBB SHADOW E&M-EST. PATIENT-LVL II: ICD-10-PCS | Mod: PBBFAC,,, | Performed by: ADVANCED PRACTICE MIDWIFE

## 2019-12-18 PROCEDURE — 99212 OFFICE O/P EST SF 10 MIN: CPT | Mod: PBBFAC,25 | Performed by: ADVANCED PRACTICE MIDWIFE

## 2019-12-18 PROCEDURE — 99999 PR PBB SHADOW E&M-EST. PATIENT-LVL II: CPT | Mod: PBBFAC,,, | Performed by: ADVANCED PRACTICE MIDWIFE

## 2019-12-18 PROCEDURE — 86762 RUBELLA ANTIBODY: CPT

## 2019-12-18 PROCEDURE — 86703 HIV-1/HIV-2 1 RESULT ANTBDY: CPT

## 2019-12-18 PROCEDURE — 87340 HEPATITIS B SURFACE AG IA: CPT

## 2019-12-18 PROCEDURE — 86592 SYPHILIS TEST NON-TREP QUAL: CPT

## 2019-12-18 PROCEDURE — 85025 COMPLETE CBC W/AUTO DIFF WBC: CPT

## 2019-12-18 PROCEDURE — 86901 BLOOD TYPING SEROLOGIC RH(D): CPT

## 2019-12-18 PROCEDURE — 0500F INITIAL PRENATAL CARE VISIT: CPT | Mod: S$PBB,,, | Performed by: ADVANCED PRACTICE MIDWIFE

## 2019-12-18 PROCEDURE — 36415 COLL VENOUS BLD VENIPUNCTURE: CPT

## 2019-12-18 NOTE — PATIENT INSTRUCTIONS
Adapting to Pregnancy: Second Trimester  Keep up the healthy habits you started in your first trimester. You might be a little more tired than normal. So plan your day wisely. Look at the tips below and choose the ones that suit your lifestyle.    If you have any questions, check with your healthcare provider.   If you work  If you can, adjust your work with your employer to fit your needs. Try these tips:  · If you stand for long periods, find ways to do some tasks while sitting. Also, try to stand with 1 foot resting on a low stool or ledge. Shift your weight from foot to foot often. Wear low-heeled shoes.  · If you sit, keep your knees level with your hips. Rest your feet on a firm surface. Sit tall with support for your low back.  · If you work long hours, ask about adjusting your schedule. Try taking shorter breaks more often.  When you travel  The second trimester may be the best time for any travel. Talk to your healthcare provider about any special plans you may need to make. Always:  · Wear a seat belt. Fasten the lap part under your belly. Wear the shoulder part also.  · Take breaks often during long trips by car or plane. Move around to stretch your legs.  · Drink plenty of fluids on flights. The air in plane cabins is very dry.  · Avoid hot climates or high altitudes if you are not used to them.  · Avoid places where the food and water might make you sick.  · Make sure you are up-to-date on all immunizations, including the flu vaccine. This is especially important when traveling overseas.  Taking time to relax  Find time to rest and relax at work or at home:  · Take short time-outs daily. Do relaxation exercises.  · Breathe deeply during stressful times.  · Try not to take on too much. Plan tasks for times when you have the most energy.  · Take naps when you can. Or just sit and relax.  · After week 16, avoid lying on your back for more than a few minutes. Instead, lie on your side. Switch sides  often.  Continuing as lovers  Unless your healthcare provider tells you otherwise, there is no reason to stop having sex now. Blood supply increases to the pelvic area in the second trimester. Because of this, sex might be more enjoyable. Try different positions and see whats best. Also, talk to your partner about any changes in desire. Spotting may happen after sex. Be sure to let your healthcare provider know if there is heavy bleeding.  Keeping your environment safe  You can still clean house and use scented products. Just take some simple precautions:  · Wear gloves when using cleaning fluids.  · Open windows to let in fresh air. Use a fan if you paint.  · Avoid secondhand smoke.  · Dont breathe fumes from nail polish, hair spray, cleansers, or other chemicals.  Date Last Reviewed: 8/16/2015 © 2000-2017 [employer]. [address] [place] [address]. All rights reserved. This information is not intended as a substitute for professional medical care. Always follow your healthcare professional's instructions.        Pregnancy: Your Second Trimester Changes    Each day, you and your baby are changing and growing together. Heres a quick look at whats happening to both of you.  How You Are Changing  Even when you dont notice it, your body is adapting to meet the needs of your growing baby. The changes in your body might also affect your moods.  Your body  Your uterus expands as baby grows. As the weeks go by, you will feel more pressure on your bladder, stomach, and other organs. You may notice some skin color changes on your forehead, nose, or cheeks. Freckles may darken, and moles may grow. You may notice a darker line on your abdomen between your belly button and pubic bone in the midline.  Your moods  The second trimester is often easier than the first. Still, be prepared for mood swings. These are due to the increase in hormones (chemicals that affect the way organs work) produced by  your body. These mood swings are a normal part of pregnancy.  How your baby is growing       Month 4  Babys heartbeat may be heard with a Doppler (hand-held ultrasound device) by 9 to 10 weeks. Eyebrows, eyelashes and fingernails begin to form.  Month 5  You may feel your baby move. After a growth spurt, your baby nears 10 inches. Month 6  Babys fingerprints have formed. Your baby weighs about 1  to 2 pounds and is about 12 inches long.   Date Last Reviewed: 8/16/2015 © 2000-2017 [employer]. [address] [place] [address]. All rights reserved. This information is not intended as a substitute for professional medical care. Always follow your healthcare professional's instructions.        Pelvic Pain in Pregnancy: Unclear (2-3 Trimester)  You are well into your pregnancy and are having pain and pressure in your pelvic area. This is your lower belly (abdomen). Mild pelvic pressure or heaviness is very common in the latter stages of a healthy pregnancy. This is due to the growing uterus (womb). Although the exact cause of your pain is not certain, it does not appear to be dangerous. It may be due to ligaments in your belly stretching to support your uterus as it grows. The weight of your baby may also be causing pressure and pain. Pain can be caused by the bones of your pelvis shifting as your body makes room for the baby to pass through. This is known as symphysis pubis dysfunction (SPD). SPD can cause quite a bit of pain and discomfort as the due date nears.     Pain in the low back is common during pregnancy.   Home care  The following are general care guidelines:  · Avoid strenuous activities and long periods of standing. Bed rest is not needed unless your doctor has recommended it.  · Exercise for 30 minutes all or most days of the week. This will promote muscle tone, strength, and endurance. Ask your healthcare provider what exercises to do and to avoid.  · Sit in a warm (NOT hot)  bath. This helps relax tight, painful muscles.  · Sleep on your side with a pillow between your legs. This helps align your pelvis.  · Eat frequent, light meals. Choose foods that are easy to digest.  · Ask your doctor whether a pregnancy support belt could help you.  · If told to by your healthcare provider, take an over-the-counter medicine, such as acetaminophen, to relieve pain. Follow instructions carefully for how much to take and how often to take it. Do not take aspirin or nonsteroidal anti-inflammatory medicines (like ibuprofen) unless you have been told to do so by your healthcare provider.  Follow-up care  Follow up with your healthcare provider, or as advised.  When to seek medical advice  Call your healthcare provider right away if any of these occur:   · Sudden or gradual worsening abdominal pain  · Fainting, dizziness, or weakness when standing  · Any vaginal bleeding  · Leakage of fluid from the vagina  · Decreased fetal motion  · Repeated vomiting or diarrhea  · Pain that seems to settle in one area, especially the lower right abdomen  · Blood in vomit or bowel movements (dark red or black color)  · Fever of 100.4°F (38°C) or higher  Date Last Reviewed: 6/1/2016  © 1082-3211 [employer]. [address] [place] [address], [place] [address]. All rights reserved. This information is not intended as a substitute for professional medical care. Always follow your healthcare professional's instructions.

## 2019-12-19 PROBLEM — Z34.81 SUPERVISION OF NORMAL INTRAUTERINE PREGNANCY IN MULTIGRAVIDA IN FIRST TRIMESTER: Status: ACTIVE | Noted: 2019-12-19

## 2019-12-19 LAB
HBV SURFACE AG SERPL QL IA: NEGATIVE
HIV 1+2 AB+HIV1 P24 AG SERPL QL IA: NEGATIVE
RPR SER QL: NORMAL
RUBV IGG SER-ACNC: 12.2 IU/ML
RUBV IGG SER-IMP: REACTIVE

## 2019-12-19 NOTE — PROGRESS NOTES
CHIEF COMPLAINT:   Patient presents with      initial OB      HISTORY OF PRESENT ILLNESS  Tawanna Harp 29 y.o.  presents for initial OB  The patient has no complaints today.  Slight nausea, no vomiting. No bleeding or pain.  Pregnancy was planned and is desired.  Partner is supportive of pregnancy.  Lives at home with spouse and daughter.  No cats at home.  Stay at home mother.  Denies domestic abuse.  Denies chemical/pesticide/radiation exposure.    OB history:    LMP: Patient's last menstrual period was 2019 (exact date).  EDC: Estimated Date of Delivery: 20  EGA: 12w1d     Health Maintenance   Topic Date Due    Lipid Panel  1989    TETANUS VACCINE  2007    Pap Smear with HPV Cotest  2020       Past Medical History:   Diagnosis Date    Arthritis     neck       Past Surgical History:   Procedure Laterality Date    DILATION AND CURETTAGE OF UTERUS         Family History   Problem Relation Age of Onset    Diabetes Paternal Grandmother     Cancer Sister        Social History     Socioeconomic History    Marital status:      Spouse name: Not on file    Number of children: Not on file    Years of education: Not on file    Highest education level: Not on file   Occupational History    Not on file   Social Needs    Financial resource strain: Not on file    Food insecurity:     Worry: Not on file     Inability: Not on file    Transportation needs:     Medical: Not on file     Non-medical: Not on file   Tobacco Use    Smoking status: Never Smoker    Smokeless tobacco: Never Used   Substance and Sexual Activity    Alcohol use: No     Comment: cooks with it    Drug use: No    Sexual activity: Yes     Partners: Male   Lifestyle    Physical activity:     Days per week: Not on file     Minutes per session: Not on file    Stress: Not on file   Relationships    Social connections:     Talks on phone: Not on file     Gets together: Not on file      Attends Orthodox service: Not on file     Active member of club or organization: Not on file     Attends meetings of clubs or organizations: Not on file     Relationship status: Not on file   Other Topics Concern    Not on file   Social History Narrative    Not on file       Current Outpatient Medications   Medication Sig Dispense Refill    omega-3 fatty acids/fish oil (FISH OIL-OMEGA-3 FATTY ACIDS) 300-1,000 mg capsule Take by mouth once daily.      PRENATAL VIT CALC,IRON,FOLIC (PRENATAL VITAMIN ORAL) Take by mouth once daily.       No current facility-administered medications for this visit.        Review of patient's allergies indicates:  No Known Allergies      PHYSICAL EXAM   Vitals:    12/18/19 1025   BP: 112/78        PAIN SCALE: 0/10 None    PHYSICAL EXAM    ROS:  GENERAL: No fever, chills, fatigability or weight loss.  CV: Denies chest pain  PULM: Denies shortness of breath or wheezing.  ABDOMEN: Appetite fine. No weight loss. Denies diarrhea, abdominal pain, hematemesis or blood in stool.  URINARY: No flank pain, dysuria or hematuria.  REPRODUCTIVE: No abnormal vaginal bleeding.       PE: done on risk assessment  APPEARANCE: Well nourished, well developed, in no acute distress  ABDOMEN: Soft. No tenderness or masses. No hepatosplenomegaly. No hernias  PELVIC: deferred    A/P:     -      Patient was counseled today on A.C.S. Pap guidelines and recommendations for yearly pelvic exams, mammograms and monthly self breast exams; to see her PCP for other health maintenance and pregnancy.   -      Patient's medications and medical history reviewed with patient and implications in pregnancy.   -      Pregnancy course discussed and 'AtoZ' book given. Patient was counseled on proper weight gain based on the [place] [facility]'s recommendations based on her pre-pregnancy weight. Discussed foods to avoid in pregnancy (i.e. sushi, fish that are high in mercury, deli meat, and unpasteurized cheeses). Discussed  prenatal vitamin options (i.e. stool softener, DHA). Discussed potential medical problems in pregnancy.  -     Discussed risk of Toxoplasmosis transmission from pets and reviewed risk reduction techniques.  -     Chromosomal abnormality risk discussed and available testing offered - declined QMS  -     Pt was counseled on exercise in pregnancy and weight gain recommendations.  -     Pt was counseled on travel recommendations and on risks of Zika virus exposure.  Current [facility] Zika advisories and prevention techniques were reviewed with pt.  Pt denies any recent international travel and does not plan travel during pregnancy.  Pt reports that partner does not plan travel either.  -     Oriented to practice including CNM collaboration.   -     Follow-up routine OB  labs today and u/s reviewed, DAHLIA provided.  Discussed last birth, pt had velamentous cord insertion, placenta manually removed, pt was very uncomfortable after NCB. Discussed indications if PPH, otherwise can allow for spontaneous delivery of placenta, discussed ways to promote separation, breastfeeding or fundal massage to facilitate UC if indicated. Pt prefers [facility] [place] location, closer to home. al

## 2020-01-16 ENCOUNTER — ROUTINE PRENATAL (OUTPATIENT)
Dept: OBSTETRICS AND GYNECOLOGY | Facility: CLINIC | Age: 30
End: 2020-01-16

## 2020-01-16 VITALS — DIASTOLIC BLOOD PRESSURE: 64 MMHG | BODY MASS INDEX: 22.07 KG/M2 | SYSTOLIC BLOOD PRESSURE: 106 MMHG | WEIGHT: 149.5 LBS

## 2020-01-16 DIAGNOSIS — Z3A.16 16 WEEKS GESTATION OF PREGNANCY: ICD-10-CM

## 2020-01-16 DIAGNOSIS — Z34.92 NORMAL PREGNANCY IN SECOND TRIMESTER: Primary | ICD-10-CM

## 2020-01-16 PROCEDURE — 0502F SUBSEQUENT PRENATAL CARE: CPT | Mod: ,,, | Performed by: MIDWIFE

## 2020-01-16 PROCEDURE — 99999 PR PBB SHADOW E&M-EST. PATIENT-LVL II: CPT | Mod: PBBFAC,,, | Performed by: MIDWIFE

## 2020-01-16 PROCEDURE — 0502F PR SUBSEQUENT PRENATAL CARE: ICD-10-PCS | Mod: ,,, | Performed by: MIDWIFE

## 2020-01-16 PROCEDURE — 99212 OFFICE O/P EST SF 10 MIN: CPT | Mod: PBBFAC | Performed by: MIDWIFE

## 2020-01-16 PROCEDURE — 99999 PR PBB SHADOW E&M-EST. PATIENT-LVL II: ICD-10-PCS | Mod: PBBFAC,,, | Performed by: MIDWIFE

## 2020-01-16 NOTE — PROGRESS NOTES
Doing well, feeling much better  Denies cramping/bleeding  Some round ligament stretching  Declines genetic screening  S=D  Breast/NCB/skin to skin discussed  RTC in 4 weeks with anatomy scan

## 2020-02-14 ENCOUNTER — ROUTINE PRENATAL (OUTPATIENT)
Dept: OBSTETRICS AND GYNECOLOGY | Facility: CLINIC | Age: 30
End: 2020-02-14

## 2020-02-14 ENCOUNTER — PROCEDURE VISIT (OUTPATIENT)
Dept: OBSTETRICS AND GYNECOLOGY | Facility: CLINIC | Age: 30
End: 2020-02-14

## 2020-02-14 VITALS
SYSTOLIC BLOOD PRESSURE: 108 MMHG | DIASTOLIC BLOOD PRESSURE: 60 MMHG | BODY MASS INDEX: 22.76 KG/M2 | WEIGHT: 154.13 LBS

## 2020-02-14 DIAGNOSIS — Z34.92 NORMAL PREGNANCY IN SECOND TRIMESTER: ICD-10-CM

## 2020-02-14 DIAGNOSIS — Z3A.16 16 WEEKS GESTATION OF PREGNANCY: ICD-10-CM

## 2020-02-14 DIAGNOSIS — Z36.89 ENCOUNTER FOR FETAL ANATOMIC SURVEY: ICD-10-CM

## 2020-02-14 PROBLEM — Q51.28 SEPTATE UTERUS: Status: RESOLVED | Noted: 2017-12-16 | Resolved: 2020-02-14

## 2020-02-14 PROCEDURE — 99999 PR PBB SHADOW E&M-EST. PATIENT-LVL II: CPT | Mod: PBBFAC,,, | Performed by: ADVANCED PRACTICE MIDWIFE

## 2020-02-14 PROCEDURE — 76805 PR US, OB 14+WKS, TRANSABD, SINGLE GESTATION: ICD-10-PCS | Mod: 26,S$PBB,, | Performed by: OBSTETRICS & GYNECOLOGY

## 2020-02-14 PROCEDURE — 76805 OB US >/= 14 WKS SNGL FETUS: CPT | Mod: 26,S$PBB,, | Performed by: OBSTETRICS & GYNECOLOGY

## 2020-02-14 PROCEDURE — 99212 OFFICE O/P EST SF 10 MIN: CPT | Mod: PBBFAC | Performed by: ADVANCED PRACTICE MIDWIFE

## 2020-02-14 PROCEDURE — 0502F PR SUBSEQUENT PRENATAL CARE: ICD-10-PCS | Mod: ,,, | Performed by: ADVANCED PRACTICE MIDWIFE

## 2020-02-14 PROCEDURE — 76805 OB US >/= 14 WKS SNGL FETUS: CPT | Mod: PBBFAC | Performed by: OBSTETRICS & GYNECOLOGY

## 2020-02-14 PROCEDURE — 99999 PR PBB SHADOW E&M-EST. PATIENT-LVL II: ICD-10-PCS | Mod: PBBFAC,,, | Performed by: ADVANCED PRACTICE MIDWIFE

## 2020-02-14 PROCEDURE — 0502F SUBSEQUENT PRENATAL CARE: CPT | Mod: ,,, | Performed by: ADVANCED PRACTICE MIDWIFE

## 2020-02-14 NOTE — PROGRESS NOTES
sono done posterior placenta with marginal insertion,needs rescan in 4 weeks  Patient viewed [employer] video, Learn Your Baby and was provided with [facility] handouts: Baby Led Feeding and Rooming In. Discussed benefits of rooming-in 24 hours a day, benefits of cue-based feeding, the impact of feeding frequency on milk supply, and basic breastfeeding management. Encouraged [facility] to attend [facility] P[facility] and to download the [employer] mobile chuck if she has not already done so. Patient verbalizes understanding.

## 2020-03-12 ENCOUNTER — TELEPHONE (OUTPATIENT)
Dept: OBSTETRICS AND GYNECOLOGY | Facility: CLINIC | Age: 30
End: 2020-03-12

## 2020-03-12 NOTE — TELEPHONE ENCOUNTER
----- Message from Lauren Cabrera sent at 3/12/2020  9:58 AM CDT -----  Contact: [facility]  Request a call concerning a medical release form sent on this pt, no additonal info given and can be reached at [phone]///thxMW

## 2020-03-12 NOTE — TELEPHONE ENCOUNTER
Fax given for them to send over medical clearance for patient to have dental work.  Will fax to [phone].

## 2020-03-20 ENCOUNTER — PROCEDURE VISIT (OUTPATIENT)
Dept: OBSTETRICS AND GYNECOLOGY | Facility: CLINIC | Age: 30
End: 2020-03-20

## 2020-03-20 ENCOUNTER — ROUTINE PRENATAL (OUTPATIENT)
Dept: OBSTETRICS AND GYNECOLOGY | Facility: CLINIC | Age: 30
End: 2020-03-20

## 2020-03-20 VITALS
BODY MASS INDEX: 23.83 KG/M2 | DIASTOLIC BLOOD PRESSURE: 68 MMHG | SYSTOLIC BLOOD PRESSURE: 122 MMHG | WEIGHT: 161.38 LBS

## 2020-03-20 DIAGNOSIS — O43.199 MARGINAL INSERTION OF UMBILICAL CORD AFFECTING MANAGEMENT OF MOTHER: ICD-10-CM

## 2020-03-20 DIAGNOSIS — Z34.81 SUPERVISION OF NORMAL INTRAUTERINE PREGNANCY IN MULTIGRAVIDA IN FIRST TRIMESTER: ICD-10-CM

## 2020-03-20 DIAGNOSIS — Z3A.28 28 WEEKS GESTATION OF PREGNANCY: Primary | ICD-10-CM

## 2020-03-20 PROCEDURE — 0502F PR SUBSEQUENT PRENATAL CARE: ICD-10-PCS | Mod: ,,, | Performed by: OBSTETRICS & GYNECOLOGY

## 2020-03-20 PROCEDURE — 0502F SUBSEQUENT PRENATAL CARE: CPT | Mod: ,,, | Performed by: OBSTETRICS & GYNECOLOGY

## 2020-03-20 PROCEDURE — 76816 OB US FOLLOW-UP PER FETUS: CPT | Mod: PBBFAC | Performed by: OBSTETRICS & GYNECOLOGY

## 2020-03-20 PROCEDURE — 76816 OB US FOLLOW-UP PER FETUS: CPT | Mod: 26,S$PBB,, | Performed by: OBSTETRICS & GYNECOLOGY

## 2020-03-20 PROCEDURE — 99999 PR PBB SHADOW E&M-EST. PATIENT-LVL II: CPT | Mod: PBBFAC,,, | Performed by: OBSTETRICS & GYNECOLOGY

## 2020-03-20 PROCEDURE — 99212 OFFICE O/P EST SF 10 MIN: CPT | Mod: PBBFAC,25 | Performed by: OBSTETRICS & GYNECOLOGY

## 2020-03-20 PROCEDURE — 76816 PR  US,PREGNANT UTERUS,F/U,TRANSABD APP: ICD-10-PCS | Mod: 26,S$PBB,, | Performed by: OBSTETRICS & GYNECOLOGY

## 2020-03-20 PROCEDURE — 99999 PR PBB SHADOW E&M-EST. PATIENT-LVL II: ICD-10-PCS | Mod: PBBFAC,,, | Performed by: OBSTETRICS & GYNECOLOGY

## 2020-03-20 NOTE — PROGRESS NOTES
No complaints.  U/S today shows marginal cord insertion (1.8 cm from placental edge).  Normal growth, anatomy complete.  Patient concerned due to velamentous cord insertion with last pregnancy that required manual placental removal.  She is concerned about potential risks to baby, as well as significant pain that was associated with manual removal.  Desires natural birth, but possibly interested in induction at 39 weeks if cervix favorable.  Plan:  Discussed with patient the very low risks of adverse outcome associated with marginal cord insertion and reassurance given.  Recommend another growth u/s at 34-35 weeks and will recheck placenta and cord insertion then.  Another u/s could be done at 39 weeks to help with decision of induction vs expectant management.  All questions answered.

## 2020-04-09 ENCOUNTER — ROUTINE PRENATAL (OUTPATIENT)
Dept: OBSTETRICS AND GYNECOLOGY | Facility: CLINIC | Age: 30
End: 2020-04-09

## 2020-04-09 ENCOUNTER — LAB VISIT (OUTPATIENT)
Dept: LAB | Facility: HOSPITAL | Age: 30
End: 2020-04-09
Attending: OBSTETRICS & GYNECOLOGY

## 2020-04-09 VITALS — BODY MASS INDEX: 24.51 KG/M2 | SYSTOLIC BLOOD PRESSURE: 120 MMHG | DIASTOLIC BLOOD PRESSURE: 64 MMHG | WEIGHT: 166 LBS

## 2020-04-09 DIAGNOSIS — Z3A.28 28 WEEKS GESTATION OF PREGNANCY: ICD-10-CM

## 2020-04-09 DIAGNOSIS — Z34.93 NORMAL PREGNANCY IN THIRD TRIMESTER: Primary | ICD-10-CM

## 2020-04-09 LAB
BASOPHILS # BLD AUTO: 0.04 K/UL (ref 0–0.2)
BASOPHILS NFR BLD: 0.4 % (ref 0–1.9)
DIFFERENTIAL METHOD: ABNORMAL
EOSINOPHIL # BLD AUTO: 0.2 K/UL (ref 0–0.5)
EOSINOPHIL NFR BLD: 1.6 % (ref 0–8)
ERYTHROCYTE [DISTWIDTH] IN BLOOD BY AUTOMATED COUNT: 13.4 % (ref 11.5–14.5)
GLUCOSE SERPL-MCNC: 87 MG/DL (ref 70–140)
HCT VFR BLD AUTO: 36.5 % (ref 37–48.5)
HGB BLD-MCNC: 11.3 G/DL (ref 12–16)
HIV 1+2 AB+HIV1 P24 AG SERPL QL IA: NEGATIVE
IMM GRANULOCYTES # BLD AUTO: 0.05 K/UL (ref 0–0.04)
IMM GRANULOCYTES NFR BLD AUTO: 0.5 % (ref 0–0.5)
LYMPHOCYTES # BLD AUTO: 1.5 K/UL (ref 1–4.8)
LYMPHOCYTES NFR BLD: 14.4 % (ref 18–48)
MCH RBC QN AUTO: 29 PG (ref 27–31)
MCHC RBC AUTO-ENTMCNC: 31 G/DL (ref 32–36)
MCV RBC AUTO: 94 FL (ref 82–98)
MONOCYTES # BLD AUTO: 0.7 K/UL (ref 0.3–1)
MONOCYTES NFR BLD: 6.5 % (ref 4–15)
NEUTROPHILS # BLD AUTO: 8 K/UL (ref 1.8–7.7)
NEUTROPHILS NFR BLD: 76.6 % (ref 38–73)
NRBC BLD-RTO: 0 /100 WBC
PLATELET # BLD AUTO: 223 K/UL (ref 150–350)
PMV BLD AUTO: 10.9 FL (ref 9.2–12.9)
RBC # BLD AUTO: 3.9 M/UL (ref 4–5.4)
WBC # BLD AUTO: 10.4 K/UL (ref 3.9–12.7)

## 2020-04-09 PROCEDURE — 86592 SYPHILIS TEST NON-TREP QUAL: CPT

## 2020-04-09 PROCEDURE — 99999 PR PBB SHADOW E&M-EST. PATIENT-LVL II: ICD-10-PCS | Mod: PBBFAC,,, | Performed by: MIDWIFE

## 2020-04-09 PROCEDURE — 85025 COMPLETE CBC W/AUTO DIFF WBC: CPT

## 2020-04-09 PROCEDURE — 36415 COLL VENOUS BLD VENIPUNCTURE: CPT

## 2020-04-09 PROCEDURE — 0502F PR SUBSEQUENT PRENATAL CARE: ICD-10-PCS | Mod: ,,, | Performed by: MIDWIFE

## 2020-04-09 PROCEDURE — 86703 HIV-1/HIV-2 1 RESULT ANTBDY: CPT

## 2020-04-09 PROCEDURE — 0502F SUBSEQUENT PRENATAL CARE: CPT | Mod: ,,, | Performed by: MIDWIFE

## 2020-04-09 PROCEDURE — 99999 PR PBB SHADOW E&M-EST. PATIENT-LVL II: CPT | Mod: PBBFAC,,, | Performed by: MIDWIFE

## 2020-04-09 PROCEDURE — 82950 GLUCOSE TEST: CPT

## 2020-04-09 PROCEDURE — 99212 OFFICE O/P EST SF 10 MIN: CPT | Mod: PBBFAC | Performed by: MIDWIFE

## 2020-04-09 NOTE — PROGRESS NOTES
Doing well! Baby very active  Denies s/s of PTL/pre-e  Signed up for [employer]  Desires to breastfeed, exclusively pumped for 10 months with 1st baby due to latch issues  28 week labs today  17lbs 6.7oz ELIS, S=D  Reviewed kick counts, pre-e, PTL  Virtual visit in 2 weeks

## 2020-04-11 LAB — RPR SER QL: NORMAL

## 2020-04-13 ENCOUNTER — PATIENT MESSAGE (OUTPATIENT)
Dept: ADMINISTRATIVE | Facility: OTHER | Age: 30
End: 2020-04-13

## 2020-04-30 ENCOUNTER — OFFICE VISIT (OUTPATIENT)
Dept: OBSTETRICS AND GYNECOLOGY | Facility: CLINIC | Age: 30
End: 2020-04-30

## 2020-04-30 DIAGNOSIS — Z34.93 NORMAL PREGNANCY IN THIRD TRIMESTER: Primary | ICD-10-CM

## 2020-04-30 PROCEDURE — 99213 OFFICE O/P EST LOW 20 MIN: CPT | Mod: 95,,, | Performed by: MIDWIFE

## 2020-04-30 PROCEDURE — 99213 PR OFFICE/OUTPT VISIT, EST, LEVL III, 20-29 MIN: ICD-10-PCS | Mod: 95,,, | Performed by: MIDWIFE

## 2020-05-07 VITALS
DIASTOLIC BLOOD PRESSURE: 67 MMHG | WEIGHT: 166.25 LBS | BODY MASS INDEX: 24.55 KG/M2 | SYSTOLIC BLOOD PRESSURE: 108 MMHG

## 2020-05-07 NOTE — PROGRESS NOTES
The patient location is: home  The chief complaint leading to consultation is: routine PN visit during Covid-19 pandemic  Visit type: audiovisual  Total time spent with patient: 7 minutes, 100% of time was spent counseling & coordinating care with patient  Each patient to whom he or she provides medical services by telemedicine is:  (1) informed of the relationship between the physician and patient and the respective role of any other health care provider with respect to management of the patient; and (2) notified that he or she may decline to receive medical services by telemedicine and may withdraw from such care at any time.    Doing well, denies complaints  Reports good fetal movement  Denies s/s of PTL/preeclampsia  Reviewed 28 week labs, WNL  States does not want to do PAP during postpartal period, requesting it be performed at same appointment as GBS screening  Declines TDAP  17 lb 10.6 TWG  Reviewed kick counts, PTL & pre-e precautions  RTC in 3 weeks

## 2020-05-25 ENCOUNTER — ROUTINE PRENATAL (OUTPATIENT)
Dept: OBSTETRICS AND GYNECOLOGY | Facility: CLINIC | Age: 30
End: 2020-05-25

## 2020-05-25 VITALS
WEIGHT: 172.38 LBS | SYSTOLIC BLOOD PRESSURE: 118 MMHG | DIASTOLIC BLOOD PRESSURE: 78 MMHG | BODY MASS INDEX: 25.46 KG/M2

## 2020-05-25 DIAGNOSIS — Z36.89 DETERMINE FETAL PRESENTATION USING ULTRASOUND: ICD-10-CM

## 2020-05-25 DIAGNOSIS — Z34.93 NORMAL PREGNANCY IN THIRD TRIMESTER: Primary | ICD-10-CM

## 2020-05-25 PROCEDURE — 0502F PR SUBSEQUENT PRENATAL CARE: ICD-10-PCS | Mod: ,,, | Performed by: MIDWIFE

## 2020-05-25 PROCEDURE — 99999 PR PBB SHADOW E&M-EST. PATIENT-LVL II: CPT | Mod: PBBFAC,,, | Performed by: MIDWIFE

## 2020-05-25 PROCEDURE — 99212 OFFICE O/P EST SF 10 MIN: CPT | Mod: PBBFAC | Performed by: MIDWIFE

## 2020-05-25 PROCEDURE — 0502F SUBSEQUENT PRENATAL CARE: CPT | Mod: ,,, | Performed by: MIDWIFE

## 2020-05-25 PROCEDURE — 99999 PR PBB SHADOW E&M-EST. PATIENT-LVL II: ICD-10-PCS | Mod: PBBFAC,,, | Performed by: MIDWIFE

## 2020-05-25 NOTE — PROGRESS NOTES
Doing well, no complaints  Baby very active  Denies s/s of PTL/pre-e  Suspect breech presentation by Leopold's, BREECH confirmed by U/S  Discussed methods to turn baby. Spinning babies, chiropractor, etc.   Gbs handout given  Wants PAP with GBS  23 lb 13 oz TWG, S=D  Kick counts, pre-e/PTL precautions  RTC in 1 week with 3T U/S

## 2020-06-01 ENCOUNTER — PROCEDURE VISIT (OUTPATIENT)
Dept: OBSTETRICS AND GYNECOLOGY | Facility: CLINIC | Age: 30
End: 2020-06-01

## 2020-06-01 ENCOUNTER — ROUTINE PRENATAL (OUTPATIENT)
Dept: OBSTETRICS AND GYNECOLOGY | Facility: CLINIC | Age: 30
End: 2020-06-01

## 2020-06-01 VITALS
WEIGHT: 174.38 LBS | DIASTOLIC BLOOD PRESSURE: 56 MMHG | BODY MASS INDEX: 25.75 KG/M2 | SYSTOLIC BLOOD PRESSURE: 108 MMHG

## 2020-06-01 DIAGNOSIS — Z34.80 SUPERVISION OF OTHER NORMAL PREGNANCY: ICD-10-CM

## 2020-06-01 DIAGNOSIS — Z36.89 DETERMINE FETAL PRESENTATION USING ULTRASOUND: ICD-10-CM

## 2020-06-01 PROCEDURE — 76816 OB US FOLLOW-UP PER FETUS: CPT | Mod: PBBFAC | Performed by: OBSTETRICS & GYNECOLOGY

## 2020-06-01 PROCEDURE — 76819 FETAL BIOPHYS PROFIL W/O NST: CPT | Mod: PBBFAC | Performed by: OBSTETRICS & GYNECOLOGY

## 2020-06-01 PROCEDURE — 0502F SUBSEQUENT PRENATAL CARE: CPT | Mod: ,,, | Performed by: ADVANCED PRACTICE MIDWIFE

## 2020-06-01 PROCEDURE — 0502F PR SUBSEQUENT PRENATAL CARE: ICD-10-PCS | Mod: ,,, | Performed by: ADVANCED PRACTICE MIDWIFE

## 2020-06-01 PROCEDURE — 99999 PR PBB SHADOW E&M-EST. PATIENT-LVL II: CPT | Mod: PBBFAC,,, | Performed by: ADVANCED PRACTICE MIDWIFE

## 2020-06-01 PROCEDURE — 76819 FETAL BIOPHYS PROFIL W/O NST: CPT | Mod: 26,S$PBB,, | Performed by: OBSTETRICS & GYNECOLOGY

## 2020-06-01 PROCEDURE — 99212 OFFICE O/P EST SF 10 MIN: CPT | Mod: PBBFAC | Performed by: ADVANCED PRACTICE MIDWIFE

## 2020-06-01 PROCEDURE — 76819 PR US, OB, FETAL BIOPHYSICAL, W/O NST: ICD-10-PCS | Mod: 26,S$PBB,, | Performed by: OBSTETRICS & GYNECOLOGY

## 2020-06-01 PROCEDURE — 76816 PR  US,PREGNANT UTERUS,F/U,TRANSABD APP: ICD-10-PCS | Mod: 26,S$PBB,, | Performed by: OBSTETRICS & GYNECOLOGY

## 2020-06-01 PROCEDURE — 76816 OB US FOLLOW-UP PER FETUS: CPT | Mod: 26,S$PBB,, | Performed by: OBSTETRICS & GYNECOLOGY

## 2020-06-01 PROCEDURE — 99999 PR PBB SHADOW E&M-EST. PATIENT-LVL II: ICD-10-PCS | Mod: PBBFAC,,, | Performed by: ADVANCED PRACTICE MIDWIFE

## 2020-06-01 NOTE — PATIENT INSTRUCTIONS

## 2020-06-01 NOTE — PROGRESS NOTES
Ultrasound secondary to presentation,  Breech MVP 5.6 cm, FUAD 14.6 cm  BPP 8 8, EFW 6 lb 2 oz at 39 percentile - reassuring.  Breech presentation discussed, attending chiropractor /spinning baby's.  Schedule with Lisbeth PENA at next visit with an ultrasound to discuss options /evaluate and for questions regarding ECV scheduled .  Patient happy with this plan, follow-up as indicated /decided  GBS next visit.    Labor precautions and kick counts reviewed

## 2020-06-08 ENCOUNTER — ROUTINE PRENATAL (OUTPATIENT)
Dept: OBSTETRICS AND GYNECOLOGY | Facility: CLINIC | Age: 30
End: 2020-06-08

## 2020-06-08 ENCOUNTER — PROCEDURE VISIT (OUTPATIENT)
Dept: OBSTETRICS AND GYNECOLOGY | Facility: CLINIC | Age: 30
End: 2020-06-08

## 2020-06-08 VITALS
WEIGHT: 174.38 LBS | BODY MASS INDEX: 25.75 KG/M2 | SYSTOLIC BLOOD PRESSURE: 110 MMHG | DIASTOLIC BLOOD PRESSURE: 64 MMHG

## 2020-06-08 DIAGNOSIS — Z34.93 NORMAL PREGNANCY IN THIRD TRIMESTER: ICD-10-CM

## 2020-06-08 DIAGNOSIS — O43.199 MARGINAL INSERTION OF UMBILICAL CORD AFFECTING MANAGEMENT OF MOTHER: ICD-10-CM

## 2020-06-08 PROCEDURE — 76816 OB US FOLLOW-UP PER FETUS: CPT | Mod: 26,S$PBB,, | Performed by: OBSTETRICS & GYNECOLOGY

## 2020-06-08 PROCEDURE — 76816 PR  US,PREGNANT UTERUS,F/U,TRANSABD APP: ICD-10-PCS | Mod: 26,S$PBB,, | Performed by: OBSTETRICS & GYNECOLOGY

## 2020-06-08 PROCEDURE — 87081 CULTURE SCREEN ONLY: CPT

## 2020-06-08 PROCEDURE — 76819 FETAL BIOPHYS PROFIL W/O NST: CPT | Mod: PBBFAC | Performed by: OBSTETRICS & GYNECOLOGY

## 2020-06-08 PROCEDURE — 99999 PR PBB SHADOW E&M-EST. PATIENT-LVL II: ICD-10-PCS | Mod: PBBFAC,,, | Performed by: ADVANCED PRACTICE MIDWIFE

## 2020-06-08 PROCEDURE — 0502F PR SUBSEQUENT PRENATAL CARE: ICD-10-PCS | Mod: ,,, | Performed by: ADVANCED PRACTICE MIDWIFE

## 2020-06-08 PROCEDURE — 99999 PR PBB SHADOW E&M-EST. PATIENT-LVL II: CPT | Mod: PBBFAC,,, | Performed by: ADVANCED PRACTICE MIDWIFE

## 2020-06-08 PROCEDURE — 76819 FETAL BIOPHYS PROFIL W/O NST: CPT | Mod: 26,S$PBB,, | Performed by: OBSTETRICS & GYNECOLOGY

## 2020-06-08 PROCEDURE — 76816 OB US FOLLOW-UP PER FETUS: CPT | Mod: PBBFAC | Performed by: OBSTETRICS & GYNECOLOGY

## 2020-06-08 PROCEDURE — 0502F SUBSEQUENT PRENATAL CARE: CPT | Mod: ,,, | Performed by: ADVANCED PRACTICE MIDWIFE

## 2020-06-08 PROCEDURE — 76819 PR US, OB, FETAL BIOPHYSICAL, W/O NST: ICD-10-PCS | Mod: 26,S$PBB,, | Performed by: OBSTETRICS & GYNECOLOGY

## 2020-06-08 PROCEDURE — 99212 OFFICE O/P EST SF 10 MIN: CPT | Mod: PBBFAC | Performed by: ADVANCED PRACTICE MIDWIFE

## 2020-06-08 NOTE — PROGRESS NOTES
GBS done  Pros and cons of breech version discussed Doing spinning babies and seeing chiropractor Would like version on Monday  Scheduled for Monday at 6:30 am  Kick counts baby active

## 2020-06-10 LAB — BACTERIA SPEC AEROBE CULT: NORMAL

## 2020-06-15 ENCOUNTER — HOSPITAL ENCOUNTER (OUTPATIENT)
Facility: HOSPITAL | Age: 30
Discharge: HOME | End: 2020-06-15
Attending: OBSTETRICS & GYNECOLOGY | Admitting: OBSTETRICS & GYNECOLOGY

## 2020-06-15 LAB — SARS-COV-2 RDRP RESP QL NAA+PROBE: NEGATIVE

## 2020-06-15 PROCEDURE — U0002 COVID-19 LAB TEST NON-CDC: HCPCS

## 2020-06-15 PROCEDURE — 59025 OBTAIN FETAL NONSTRESS TEST (NST): ICD-10-PCS | Mod: 26,,, | Performed by: ADVANCED PRACTICE MIDWIFE

## 2020-06-15 PROCEDURE — 96372 THER/PROPH/DIAG INJ SC/IM: CPT

## 2020-06-15 PROCEDURE — G0378 HOSPITAL OBSERVATION PER HR: HCPCS

## 2020-06-15 PROCEDURE — 59025 FETAL NON-STRESS TEST: CPT | Mod: 26,,, | Performed by: ADVANCED PRACTICE MIDWIFE

## 2020-06-15 PROCEDURE — 99213 OFFICE O/P EST LOW 20 MIN: CPT | Mod: 25,,, | Performed by: ADVANCED PRACTICE MIDWIFE

## 2020-06-15 PROCEDURE — 99213 PR OFFICE/OUTPT VISIT, EST, LEVL III, 20-29 MIN: ICD-10-PCS | Mod: 25,,, | Performed by: ADVANCED PRACTICE MIDWIFE

## 2020-06-15 PROCEDURE — 99499 NO LOS: ICD-10-PCS | Mod: ,,, | Performed by: OBSTETRICS & GYNECOLOGY

## 2020-06-15 PROCEDURE — 99499 UNLISTED E&M SERVICE: CPT | Mod: ,,, | Performed by: OBSTETRICS & GYNECOLOGY

## 2020-06-15 PROCEDURE — 59025 FETAL NON-STRESS TEST: CPT

## 2020-06-15 PROCEDURE — 63600175 PHARM REV CODE 636 W HCPCS: Performed by: ADVANCED PRACTICE MIDWIFE

## 2020-06-15 PROCEDURE — 99211 OFF/OP EST MAY X REQ PHY/QHP: CPT | Mod: 25

## 2020-06-15 PROCEDURE — 59412 ANTEPARTUM MANIPULATION: CPT

## 2020-06-15 RX ORDER — SODIUM CHLORIDE 9 MG/ML
INJECTION, SOLUTION INTRAVENOUS CONTINUOUS
Status: DISCONTINUED | OUTPATIENT
Start: 2020-06-15 | End: 2020-06-15 | Stop reason: HOSPADM

## 2020-06-15 RX ORDER — ONDANSETRON 8 MG/1
8 TABLET, ORALLY DISINTEGRATING ORAL EVERY 8 HOURS PRN
Status: DISCONTINUED | OUTPATIENT
Start: 2020-06-15 | End: 2020-06-15 | Stop reason: HOSPADM

## 2020-06-15 RX ORDER — ACETAMINOPHEN 500 MG
500 TABLET ORAL EVERY 6 HOURS PRN
Status: DISCONTINUED | OUTPATIENT
Start: 2020-06-15 | End: 2020-06-15 | Stop reason: HOSPADM

## 2020-06-15 RX ORDER — TERBUTALINE SULFATE 1 MG/ML
0.25 INJECTION SUBCUTANEOUS ONCE
Status: COMPLETED | OUTPATIENT
Start: 2020-06-15 | End: 2020-06-15

## 2020-06-15 RX ADMIN — TERBUTALINE SULFATE 2.5 MG: 1 INJECTION SUBCUTANEOUS at 07:06

## 2020-06-15 NOTE — SUBJECTIVE & OBJECTIVE
Obstetric HPI:  Patient reports None contractions, active fetal movement, No vaginal bleeding , No loss of fluid     This pregnancy has been complicated by marginal insertion of umbilical cord    OB History    Para Term  AB Living   4 1 1 0 2 1   SAB TAB Ectopic Multiple Live Births   2 0 0 0 1      # Outcome Date GA Lbr Jarad/2nd Weight Sex Delivery Anes PTL Lv   4 Current            3 Term 18 41w4d  3.81 kg (8 lb 6.4 oz) F Vag-Spont None N SUNIL      Name: PETE CASTORENA      Apgar1: 9  Apgar5: 9   2 SAB            1 SAB              Past Medical History:   Diagnosis Date    Arthritis     neck     Past Surgical History:   Procedure Laterality Date    DILATION AND CURETTAGE OF UTERUS      Resection of Uterine Septum  2018       PTA Medications   Medication Sig    Lactobacillus rhamnosus GG (CULTURELLE) 10 billion cell capsule Take 1 capsule by mouth once daily.    PRENATAL VIT CALC,IRON,FOLIC (PRENATAL VITAMIN ORAL) Take by mouth once daily.       Review of patient's allergies indicates:  No Known Allergies     Family History     Problem Relation (Age of Onset)    Cancer Sister    Diabetes Paternal Grandmother        Tobacco Use    Smoking status: Never Smoker    Smokeless tobacco: Never Used   Substance and Sexual Activity    Alcohol use: No     Comment: cooks with it    Drug use: No    Sexual activity: Yes     Partners: Male     Review of Systems   Constitutional: Negative.    HENT: Negative.    Eyes: Negative.    Respiratory: Negative.    Cardiovascular: Negative.    Gastrointestinal: Negative.    Endocrine: Negative.    Genitourinary: Negative.    Musculoskeletal: Negative.    Integumentary:  Negative.   Neurological: Negative.    Hematological: Negative.    Psychiatric/Behavioral: Negative.    All other systems reviewed and are negative.  Breast: negative.       Objective:     Vital Signs (Most Recent):    Vital Signs (24h Range):           There is no height or weight on  file to calculate BMI.    FHT: 138Cat 1 (reassuring)  TOCO:  Q 60 minutes    Physical Exam:   Constitutional: She is oriented to person, place, and time. She appears well-developed and well-nourished.    HENT:   Head: Normocephalic.    Eyes: Pupils are equal, round, and reactive to light.    Neck: Normal range of motion.    Cardiovascular: Normal rate and regular rhythm.     Pulmonary/Chest: Effort normal and breath sounds normal.        Abdominal: Soft.             Musculoskeletal: Normal range of motion.       Neurological: She is alert and oriented to person, place, and time.    Skin: Skin is warm and dry.    Psychiatric: She has a normal mood and affect. Her behavior is normal.       Cervix:  Dilation:  defer  Effacement:    Station:   Presentation:      Significant Labs:  Lab Results   Component Value Date    GROUPTRH O POS 12/18/2019    HEPBSAG Negative 12/18/2019    STREPBCULT No Group B Streptococcus isolated 06/08/2020       I have personallly reviewed all pertinent lab results from the last 24 hours.

## 2020-06-15 NOTE — PROCEDURES
Tawanna Harp is a 29 y.o. female patient.            Obtain Fetal nonstress test (NST)    Date/Time: 6/15/2020 9:51 AM  Performed by: Lisbeth Waggoner CNM  Authorized by: Elaina Huff CNM     Nonstress Test:     Variability:  6-25 BPM    Decelerations:  None    Accelerations:  15 bpm    Acoustic Stimulator: No      Baseline:  145    Uterine Irritability: No      Contractions:  Not present  Biophysical Profile:     Nonstress Test Interpretation: equivocal      Overall Impression:  Reassuring  Post-procedure:     Patient tolerance:  Patient tolerated the procedure well with no immediate complications        Lisbeth Waggoner  6/15/2020

## 2020-06-15 NOTE — PROGRESS NOTES
Breech version explained to patient and consent obtained  Reactive NST  Position confirmed by ultrasound  Terbutaline.25mg sq given  Baby turned with one attempt to vertex under ultrasound with Dr Thompson  Placed back on monitor for 1 hour  Discharged home with precautions  Follow up in office 1 week  Cheyanne link

## 2020-06-15 NOTE — DISCHARGE SUMMARY
[facility]  Obstetrics  Discharge Summary      Patient Name: Tawanna Harp  MRN: [id number]  Admission Date: 6/15/2020  Hospital Length of Stay: 0 days  Discharge Date and Time:  06/15/2020 10:08 AM  Attending Physician: Roberta Thompson MD   Discharging Provider: Lisbeth Waggoner CNM   Primary Care Provider: Primary Doctor No    HPI:  IUP at 37w5d here for breech version    FHT: 145Cat 1 (reassuring)  TOCO:  Q 30 minutes    * No surgery found *     Hospital Course:   sono confirms breech  Breech version          Final Active Diagnoses:    Diagnosis Date Noted POA    Breech presentation successfully converted, antepartum, not applicable or unspecified fetus [O32.1XX0] 06/15/2020 Unknown      Problems Resolved During this Admission:    Diagnosis Date Noted Date Resolved POA    Breech presentation [O32.1XX0] 2020 06/15/2020 Yes        Significant Diagnostic Studies: Labs: All labs within the past 24 hours have been reviewed      Feeding Method: breast    Immunizations     None          This patient has no babies on file.  Pending Diagnostic Studies:     None          Discharged Condition: good    Disposition: Home or Self Care    Follow Up:  Follow-up Information     Follow up In 1 week.               Patient Instructions:      Activity as tolerated     Medications:  Current Discharge Medication List      CONTINUE these medications which have NOT CHANGED    Details   Lactobacillus rhamnosus GG (CULTURELLE) 10 billion cell capsule Take 1 capsule by mouth once daily.      PRENATAL VIT CALC,IRON,FOLIC (PRENATAL VITAMIN ORAL) Take by mouth once daily.             Lisbeth Waggoner CNM  Obstetrics  [facility]

## 2020-06-15 NOTE — NURSING
Patient discharged per CHAPINCITO Waggoner CNM order. Patient was given documentation and educated about signs and symptoms of PTL, how to perform kick counts, monitoring for infection and when to contact her health care provider and/or return to the hospital. Patient verbalized understanding and had no further questions.

## 2020-06-15 NOTE — H&P
[facility]   Obstetrics  History & Physical    Patient Name: Tawanna Harp  MRN: [id number]  Admission Date: 6/15/2020  Primary Care Provider: Primary Doctor No    Subjective:     Principal Problem:<principal problem not specified>    History of Present Illness:   IUP at 37w5d here for breech version    Obstetric HPI:  Patient reports None contractions, active fetal movement, No vaginal bleeding , No loss of fluid     This pregnancy has been complicated by marginal insertion of umbilical cord    OB History    Para Term  AB Living   4 1 1 0 2 1   SAB TAB Ectopic Multiple Live Births   2 0 0 0 1      # Outcome Date GA Lbr Jarad/2nd Weight Sex Delivery Anes PTL Lv   4 Current            3 Term 18 41w4d  3.81 kg (8 lb 6.4 oz) F Vag-Spont None N SUNIL      Name: KELL, PETE CONTRERAS      Apgar1: 9  Apgar5: 9   2 SAB            1 SAB              Past Medical History:   Diagnosis Date    Arthritis     neck     Past Surgical History:   Procedure Laterality Date    DILATION AND CURETTAGE OF UTERUS      Resection of Uterine Septum  2018       [facility] Medications   Medication Sig    Lactobacillus rhamnosus GG (CULTURELLE) 10 billion cell capsule Take 1 capsule by mouth once daily.    PRENATAL VIT CALC,IRON,FOLIC (PRENATAL VITAMIN ORAL) Take by mouth once daily.       Review of patient's allergies indicates:  No Known Allergies     Family History     Problem Relation (Age of Onset)    Cancer Sister    Diabetes Paternal Grandmother        Tobacco Use    Smoking status: Never Smoker    Smokeless tobacco: Never Used   Substance and Sexual Activity    Alcohol use: No     Comment: cooks with it    Drug use: No    Sexual activity: Yes     Partners: Male     Review of Systems   Constitutional: Negative.    HENT: Negative.    Eyes: Negative.    Respiratory: Negative.    Cardiovascular: Negative.    Gastrointestinal: Negative.    Endocrine: Negative.    Genitourinary: Negative.     Musculoskeletal: Negative.    Integumentary:  Negative.   Neurological: Negative.    Hematological: Negative.    Psychiatric/Behavioral: Negative.    All other systems reviewed and are negative.  Breast: negative.       Objective:     Vital Signs (Most Recent):    Vital Signs (24h Range):           There is no height or weight on file to calculate BMI.    FHT: 138Cat 1 (reassuring)  TOCO:  Q 60 minutes    Physical Exam:   Constitutional: She is oriented to person, place, and time. She appears well-developed and well-nourished.    HENT:   Head: Normocephalic.    Eyes: Pupils are equal, round, and reactive to light.    Neck: Normal range of motion.    Cardiovascular: Normal rate and regular rhythm.     Pulmonary/Chest: Effort normal and breath sounds normal.        Abdominal: Soft.             Musculoskeletal: Normal range of motion.       Neurological: She is alert and oriented to person, place, and time.    Skin: Skin is warm and dry.    Psychiatric: She has a normal mood and affect. Her behavior is normal.       Cervix:  Dilation:  defer  Effacement:    Station:   Presentation:      Significant Labs:  Lab Results   Component Value Date    GROUPTRH O POS 2019    HEPBSAG Negative 2019    STREPBCULT No Group B Streptococcus isolated 2020       I have personallly reviewed all pertinent lab results from the last 24 hours.    Assessment/Plan:     29 y.o. female  at 37w5d for:    Breech presentation  Monitor NST  Terbutaline  Breech version        Lisbeth Waggoner CNM  Obstetrics  [facility]

## 2020-06-24 ENCOUNTER — ROUTINE PRENATAL (OUTPATIENT)
Dept: OBSTETRICS AND GYNECOLOGY | Facility: CLINIC | Age: 30
End: 2020-06-24

## 2020-06-24 VITALS — BODY MASS INDEX: 26.73 KG/M2 | WEIGHT: 181 LBS | SYSTOLIC BLOOD PRESSURE: 110 MMHG | DIASTOLIC BLOOD PRESSURE: 66 MMHG

## 2020-06-24 DIAGNOSIS — Z34.93 NORMAL PREGNANCY IN THIRD TRIMESTER: Primary | ICD-10-CM

## 2020-06-24 PROCEDURE — 0502F PR SUBSEQUENT PRENATAL CARE: ICD-10-PCS | Mod: ,,, | Performed by: ADVANCED PRACTICE MIDWIFE

## 2020-06-24 PROCEDURE — 99212 OFFICE O/P EST SF 10 MIN: CPT | Mod: PBBFAC | Performed by: ADVANCED PRACTICE MIDWIFE

## 2020-06-24 PROCEDURE — 99999 PR PBB SHADOW E&M-EST. PATIENT-LVL II: ICD-10-PCS | Mod: PBBFAC,,, | Performed by: ADVANCED PRACTICE MIDWIFE

## 2020-06-24 PROCEDURE — 99999 PR PBB SHADOW E&M-EST. PATIENT-LVL II: CPT | Mod: PBBFAC,,, | Performed by: ADVANCED PRACTICE MIDWIFE

## 2020-06-24 PROCEDURE — 0502F SUBSEQUENT PRENATAL CARE: CPT | Mod: ,,, | Performed by: ADVANCED PRACTICE MIDWIFE

## 2020-07-01 ENCOUNTER — ROUTINE PRENATAL (OUTPATIENT)
Dept: OBSTETRICS AND GYNECOLOGY | Facility: CLINIC | Age: 30
End: 2020-07-01

## 2020-07-01 ENCOUNTER — PROCEDURE VISIT (OUTPATIENT)
Dept: OBSTETRICS AND GYNECOLOGY | Facility: CLINIC | Age: 30
End: 2020-07-01

## 2020-07-01 VITALS
WEIGHT: 179.88 LBS | DIASTOLIC BLOOD PRESSURE: 80 MMHG | SYSTOLIC BLOOD PRESSURE: 118 MMHG | BODY MASS INDEX: 26.57 KG/M2

## 2020-07-01 DIAGNOSIS — O48.0 POST-TERM PREGNANCY, 40-42 WEEKS OF GESTATION: Primary | ICD-10-CM

## 2020-07-01 DIAGNOSIS — O48.0 POST-TERM PREGNANCY, 40-42 WEEKS OF GESTATION: ICD-10-CM

## 2020-07-01 PROCEDURE — 76816 PR  US,PREGNANT UTERUS,F/U,TRANSABD APP: ICD-10-PCS | Mod: 26,59,S$PBB, | Performed by: OBSTETRICS & GYNECOLOGY

## 2020-07-01 PROCEDURE — 99999 PR PBB SHADOW E&M-EST. PATIENT-LVL II: CPT | Mod: PBBFAC,,, | Performed by: MIDWIFE

## 2020-07-01 PROCEDURE — 76816 OB US FOLLOW-UP PER FETUS: CPT | Mod: 59,PBBFAC | Performed by: OBSTETRICS & GYNECOLOGY

## 2020-07-01 PROCEDURE — 0502F PR SUBSEQUENT PRENATAL CARE: ICD-10-PCS | Mod: ,,, | Performed by: MIDWIFE

## 2020-07-01 PROCEDURE — 76819 PR US, OB, FETAL BIOPHYSICAL, W/O NST: ICD-10-PCS | Mod: 26,59,S$PBB, | Performed by: OBSTETRICS & GYNECOLOGY

## 2020-07-01 PROCEDURE — 99999 PR PBB SHADOW E&M-EST. PATIENT-LVL II: ICD-10-PCS | Mod: PBBFAC,,, | Performed by: MIDWIFE

## 2020-07-01 PROCEDURE — 0502F SUBSEQUENT PRENATAL CARE: CPT | Mod: ,,, | Performed by: MIDWIFE

## 2020-07-01 PROCEDURE — 76819 FETAL BIOPHYS PROFIL W/O NST: CPT | Mod: 59,PBBFAC | Performed by: OBSTETRICS & GYNECOLOGY

## 2020-07-01 PROCEDURE — 99212 OFFICE O/P EST SF 10 MIN: CPT | Mod: PBBFAC,25 | Performed by: MIDWIFE

## 2020-07-01 PROCEDURE — 76819 FETAL BIOPHYS PROFIL W/O NST: CPT | Mod: 26,59,S$PBB, | Performed by: OBSTETRICS & GYNECOLOGY

## 2020-07-01 PROCEDURE — 76816 OB US FOLLOW-UP PER FETUS: CPT | Mod: 26,59,S$PBB, | Performed by: OBSTETRICS & GYNECOLOGY

## 2020-07-01 RX ORDER — SIMETHICONE 80 MG
1 TABLET,CHEWABLE ORAL 4 TIMES DAILY PRN
Status: CANCELLED | OUTPATIENT
Start: 2020-07-01

## 2020-07-01 RX ORDER — OXYTOCIN/RINGER'S LACTATE 30/500 ML
2 PLASTIC BAG, INJECTION (ML) INTRAVENOUS CONTINUOUS
Status: CANCELLED | OUTPATIENT
Start: 2020-07-01

## 2020-07-01 RX ORDER — OXYTOCIN/RINGER'S LACTATE 30/500 ML
41.7 PLASTIC BAG, INJECTION (ML) INTRAVENOUS CONTINUOUS
Status: CANCELLED | OUTPATIENT
Start: 2020-07-01 | End: 2020-07-01

## 2020-07-01 RX ORDER — CALCIUM CARBONATE 200(500)MG
500 TABLET,CHEWABLE ORAL 3 TIMES DAILY PRN
Status: CANCELLED | OUTPATIENT
Start: 2020-07-01

## 2020-07-01 RX ORDER — SODIUM CHLORIDE, SODIUM LACTATE, POTASSIUM CHLORIDE, CALCIUM CHLORIDE 600; 310; 30; 20 MG/100ML; MG/100ML; MG/100ML; MG/100ML
INJECTION, SOLUTION INTRAVENOUS CONTINUOUS
Status: CANCELLED | OUTPATIENT
Start: 2020-07-01

## 2020-07-01 RX ORDER — MISOPROSTOL 100 UG/1
600 TABLET ORAL
Status: CANCELLED | OUTPATIENT
Start: 2020-07-01

## 2020-07-01 RX ORDER — OXYTOCIN/RINGER'S LACTATE 30/500 ML
333 PLASTIC BAG, INJECTION (ML) INTRAVENOUS CONTINUOUS
Status: CANCELLED | OUTPATIENT
Start: 2020-07-01 | End: 2020-07-01

## 2020-07-01 RX ORDER — SODIUM CHLORIDE 9 MG/ML
INJECTION, SOLUTION INTRAVENOUS
Status: CANCELLED | OUTPATIENT
Start: 2020-07-01

## 2020-07-01 RX ORDER — ONDANSETRON 4 MG/1
8 TABLET, ORALLY DISINTEGRATING ORAL EVERY 8 HOURS PRN
Status: CANCELLED | OUTPATIENT
Start: 2020-07-01

## 2020-07-01 NOTE — PROGRESS NOTES
Doing well, reports good fetal movement  Denies contractions, leaking of fluid, vaginal bleeding  Denies s/s of pre-eclampsia  States went to 41.4 weeks with last baby spontaneously, and wants to go to 42 weeks before being induced  Feels baby has dropped  Declines cervical exam  U/S today: BPP 8/8, MVP 6.1 cm, FUAD 13.5, 75%tile, *BABY BACK TO BREECH*  Patient desires repeat external version and IOL  Discussed with Lisbeth HEATONM, Dr. Stephani Allred, and RADHA Ferreira CNM. POC is to attempt external version tomorrow AM on L&D. If successful, will proceed with IOL. Patient called and agrees with plan. Aware will proceed with  if unable to turn baby.   Reviewed kick counts, labor & pre-e precautions  Patient to L&D 20 @ 0700 for external version and delivery

## 2020-07-02 ENCOUNTER — HOSPITAL ENCOUNTER (INPATIENT)
Facility: HOSPITAL | Age: 30
LOS: 3 days | Discharge: HOME | End: 2020-07-05
Attending: OBSTETRICS & GYNECOLOGY | Admitting: OBSTETRICS & GYNECOLOGY

## 2020-07-02 DIAGNOSIS — O48.0 POST-TERM PREGNANCY, 40-42 WEEKS OF GESTATION: ICD-10-CM

## 2020-07-02 DIAGNOSIS — Z34.90 ENCOUNTER FOR INDUCTION OF LABOR: ICD-10-CM

## 2020-07-02 LAB
ABO + RH BLD: NORMAL
BASOPHILS # BLD AUTO: 0.03 K/UL (ref 0–0.2)
BASOPHILS NFR BLD: 0.3 % (ref 0–1.9)
BLD GP AB SCN CELLS X3 SERPL QL: NORMAL
DIFFERENTIAL METHOD: ABNORMAL
EOSINOPHIL # BLD AUTO: 0.1 K/UL (ref 0–0.5)
EOSINOPHIL NFR BLD: 1.4 % (ref 0–8)
ERYTHROCYTE [DISTWIDTH] IN BLOOD BY AUTOMATED COUNT: 13.6 % (ref 11.5–14.5)
HCT VFR BLD AUTO: 36.1 % (ref 37–48.5)
HGB BLD-MCNC: 11.4 G/DL (ref 12–16)
IMM GRANULOCYTES # BLD AUTO: 0.05 K/UL (ref 0–0.04)
IMM GRANULOCYTES NFR BLD AUTO: 0.5 % (ref 0–0.5)
LYMPHOCYTES # BLD AUTO: 2 K/UL (ref 1–4.8)
LYMPHOCYTES NFR BLD: 21.1 % (ref 18–48)
MCH RBC QN AUTO: 27.1 PG (ref 27–31)
MCHC RBC AUTO-ENTMCNC: 31.6 G/DL (ref 32–36)
MCV RBC AUTO: 86 FL (ref 82–98)
MONOCYTES # BLD AUTO: 0.8 K/UL (ref 0.3–1)
MONOCYTES NFR BLD: 8.1 % (ref 4–15)
NEUTROPHILS # BLD AUTO: 6.3 K/UL (ref 1.8–7.7)
NEUTROPHILS NFR BLD: 68.6 % (ref 38–73)
NRBC BLD-RTO: 0 /100 WBC
PLATELET # BLD AUTO: 218 K/UL (ref 150–350)
PMV BLD AUTO: 10.9 FL (ref 9.2–12.9)
RBC # BLD AUTO: 4.2 M/UL (ref 4–5.4)
SARS-COV-2 RDRP RESP QL NAA+PROBE: NEGATIVE
WBC # BLD AUTO: 9.25 K/UL (ref 3.9–12.7)

## 2020-07-02 PROCEDURE — 25000003 PHARM REV CODE 250: Performed by: ADVANCED PRACTICE MIDWIFE

## 2020-07-02 PROCEDURE — 85025 COMPLETE CBC W/AUTO DIFF WBC: CPT

## 2020-07-02 PROCEDURE — 59412 ANTEPARTUM MANIPULATION: CPT

## 2020-07-02 PROCEDURE — C1726 CATH, BAL DIL, NON-VASCULAR: HCPCS

## 2020-07-02 PROCEDURE — U0002 COVID-19 LAB TEST NON-CDC: HCPCS

## 2020-07-02 PROCEDURE — 76815 OB US LIMITED FETUS(S): CPT

## 2020-07-02 PROCEDURE — 63600175 PHARM REV CODE 636 W HCPCS: Performed by: ADVANCED PRACTICE MIDWIFE

## 2020-07-02 PROCEDURE — 63600175 PHARM REV CODE 636 W HCPCS: Performed by: MIDWIFE

## 2020-07-02 PROCEDURE — 11000001 HC ACUTE MED/SURG PRIVATE ROOM

## 2020-07-02 PROCEDURE — 59412 ANTEPARTUM MANIPULATION: CPT | Mod: ,,, | Performed by: ADVANCED PRACTICE MIDWIFE

## 2020-07-02 PROCEDURE — 86850 RBC ANTIBODY SCREEN: CPT

## 2020-07-02 PROCEDURE — 59412 PR ANTEPARTUM HEAD MANIPULATION: ICD-10-PCS | Mod: ,,, | Performed by: ADVANCED PRACTICE MIDWIFE

## 2020-07-02 PROCEDURE — 72100003 HC LABOR CARE, EA. ADDL. 8 HRS

## 2020-07-02 PROCEDURE — 72100002 HC LABOR CARE, 1ST 8 HOURS

## 2020-07-02 RX ORDER — MISOPROSTOL 100 MCG
25 TABLET ORAL EVERY 6 HOURS
Status: DISCONTINUED | OUTPATIENT
Start: 2020-07-02 | End: 2020-07-02

## 2020-07-02 RX ORDER — SIMETHICONE 80 MG
1 TABLET,CHEWABLE ORAL 4 TIMES DAILY PRN
Status: DISCONTINUED | OUTPATIENT
Start: 2020-07-02 | End: 2020-07-03

## 2020-07-02 RX ORDER — OXYTOCIN/RINGER'S LACTATE 30/500 ML
41.7 PLASTIC BAG, INJECTION (ML) INTRAVENOUS CONTINUOUS
Status: ACTIVE | OUTPATIENT
Start: 2020-07-02 | End: 2020-07-02

## 2020-07-02 RX ORDER — SODIUM CHLORIDE, SODIUM LACTATE, POTASSIUM CHLORIDE, CALCIUM CHLORIDE 600; 310; 30; 20 MG/100ML; MG/100ML; MG/100ML; MG/100ML
INJECTION, SOLUTION INTRAVENOUS CONTINUOUS
Status: DISCONTINUED | OUTPATIENT
Start: 2020-07-02 | End: 2020-07-03

## 2020-07-02 RX ORDER — ONDANSETRON 8 MG/1
8 TABLET, ORALLY DISINTEGRATING ORAL EVERY 8 HOURS PRN
Status: DISCONTINUED | OUTPATIENT
Start: 2020-07-02 | End: 2020-07-03

## 2020-07-02 RX ORDER — TERBUTALINE SULFATE 1 MG/ML
0.25 INJECTION SUBCUTANEOUS ONCE
Status: COMPLETED | OUTPATIENT
Start: 2020-07-02 | End: 2020-07-02

## 2020-07-02 RX ORDER — CALCIUM CARBONATE 200(500)MG
500 TABLET,CHEWABLE ORAL 3 TIMES DAILY PRN
Status: DISCONTINUED | OUTPATIENT
Start: 2020-07-02 | End: 2020-07-03

## 2020-07-02 RX ORDER — OXYTOCIN/RINGER'S LACTATE 30/500 ML
333 PLASTIC BAG, INJECTION (ML) INTRAVENOUS CONTINUOUS
Status: ACTIVE | OUTPATIENT
Start: 2020-07-02 | End: 2020-07-02

## 2020-07-02 RX ORDER — SODIUM CHLORIDE 9 MG/ML
INJECTION, SOLUTION INTRAVENOUS
Status: DISCONTINUED | OUTPATIENT
Start: 2020-07-02 | End: 2020-07-03

## 2020-07-02 RX ORDER — MISOPROSTOL 200 UG/1
600 TABLET ORAL
Status: DISCONTINUED | OUTPATIENT
Start: 2020-07-02 | End: 2020-07-03

## 2020-07-02 RX ORDER — OXYTOCIN/RINGER'S LACTATE 30/500 ML
2 PLASTIC BAG, INJECTION (ML) INTRAVENOUS CONTINUOUS
Status: DISCONTINUED | OUTPATIENT
Start: 2020-07-02 | End: 2020-07-03

## 2020-07-02 RX ADMIN — Medication 2 MILLI-UNITS/MIN: at 05:07

## 2020-07-02 RX ADMIN — Medication 25 MCG: at 10:07

## 2020-07-02 RX ADMIN — TERBUTALINE SULFATE 0.25 MG: 1 INJECTION SUBCUTANEOUS at 08:07

## 2020-07-02 RX ADMIN — SODIUM CHLORIDE, SODIUM LACTATE, POTASSIUM CHLORIDE, AND CALCIUM CHLORIDE: .6; .31; .03; .02 INJECTION, SOLUTION INTRAVENOUS at 05:07

## 2020-07-02 NOTE — HOSPITAL COURSE
Admit for delivery  7/3/20--AROM, clear fluid, 3/70/-2, restart pitocin  7/4/20 PPD #1 routine PP care, breastfeeding  7/5/20 PPD2, Discharge instructions reviewed including PIH S&S, PPD, pain management and bleeding precautions, Will have f/u for tongue and lip revision for infant

## 2020-07-02 NOTE — PROGRESS NOTES
S:Voices no complaints  O:  VS reviewed, afebrile   Vitals:    07/02/20 1511 07/02/20 1515 07/02/20 1516 07/02/20 1521   BP:       Pulse: 80  79 78   Resp:  16     Temp:       TempSrc:       SpO2: 96%  97% 98%   Weight:       Height:           FHTs:  CAT 1 reassuring  UC:  irregular  SVE:   1.5 / 60 / V / -3, Cooks catheter inserted with ease, 60cc / 60cc    A: IUP @ 40w1d ;     Patient Active Problem List   Diagnosis    Marginal insertion of umbilical cord    Breech presentation    Supervision of other normal pregnancy    Breech presentation successfully converted, antepartum, not applicable or unspecified fetus    Post-term pregnancy, 40-42 weeks of gestation       P: low dose pitocin  Continue close monitoring of maternal/fetal status

## 2020-07-02 NOTE — PROGRESS NOTES
Patient here for breech version  Procedure explained, consents signed  sono done baby breech with back on left  Baby lifted and gentle turned to vertex  Mother and baby tolerated procedure well  Cervical exam cx ft  Will proceed with induction

## 2020-07-02 NOTE — NURSING
RADHA Ferreira CNM called to review strip [id number] to see if okay to give cytotec; pt sitting straight up; difficult to keep fht on without breaks in monitoring. JEAN verified.

## 2020-07-02 NOTE — SUBJECTIVE & OBJECTIVE
Obstetric HPI:  Patient reports no contractions, active fetal movement, No vaginal bleeding , No loss of fluid     This pregnancy has been complicated by breech presentation, marginal cord insertion    OB History    Para Term  AB Living   4 1 1 0 2 1   SAB TAB Ectopic Multiple Live Births   2 0 0 0 1      # Outcome Date GA Lbr Jarad/2nd Weight Sex Delivery Anes PTL Lv   4 Current            3 Term 18 41w4d  3.81 kg (8 lb 6.4 oz) F Vag-Spont None N SUNIL      Name: PETE CASTORENA      Apgar1: 9  Apgar5: 9   2 SAB            1 SAB              Past Medical History:   Diagnosis Date    Arthritis     neck     Past Surgical History:   Procedure Laterality Date    DILATION AND CURETTAGE OF UTERUS      Resection of Uterine Septum  2018       PTA Medications   Medication Sig    Lactobacillus rhamnosus GG (CULTURELLE) 10 billion cell capsule Take 1 capsule by mouth once daily.    PRENATAL VIT CALC,IRON,FOLIC (PRENATAL VITAMIN ORAL) Take by mouth once daily.       Review of patient's allergies indicates:  No Known Allergies     Family History     Problem Relation (Age of Onset)    Cancer Sister    Diabetes Paternal Grandmother        Tobacco Use    Smoking status: Never Smoker    Smokeless tobacco: Never Used   Substance and Sexual Activity    Alcohol use: No     Comment: cooks with it    Drug use: No    Sexual activity: Yes     Partners: Male     Review of Systems   Constitutional:        Voices no complaints @ this time and appears in NAD   All other systems reviewed and are negative.     Objective:     Vital Signs (Most Recent):    Vital Signs (24h Range):  BP: (118)/(80) 118/80        There is no height or weight on file to calculate BMI.    FHT:applied  TOCO: Applied    Physical Exam:   Constitutional: She is oriented to person, place, and time. She appears well-developed and well-nourished.        Pulmonary/Chest: Effort normal.        Abdominal: Soft.     Genitourinary:    Uterus  normal.             Musculoskeletal: Normal range of motion and moves all extremeties.       Neurological: She is alert and oriented to person, place, and time.    Skin: Skin is warm and dry.    Psychiatric: She has a normal mood and affect. Her behavior is normal.       Cervix:  Deferred @ present     Significant Labs:  Lab Results   Component Value Date    GROUPTRH O POS 12/18/2019    HEPBSAG Negative 12/18/2019    STREPBCULT No Group B Streptococcus isolated 06/08/2020       I have personallly reviewed all pertinent lab results from the last 24 hours.

## 2020-07-02 NOTE — H&P
[facility] -   Obstetrics  History & Physical    Patient Name: Tawanna Harp  MRN: [id number]  Admission Date: 2020  Primary Care Provider: Primary Doctor No    Subjective:     Principal Problem:Breech presentation    History of Present Illness:  Presents for scheduled version and IOL if successful or  if not    Obstetric HPI:  Patient reports no contractions, active fetal movement, No vaginal bleeding , No loss of fluid     This pregnancy has been complicated by breech presentation, marginal cord insertion    OB History    Para Term  AB Living   4 1 1 0 2 1   SAB TAB Ectopic Multiple Live Births   2 0 0 0 1      # Outcome Date GA Lbr Jarad/2nd Weight Sex Delivery Anes PTL Lv   4 Current            3 Term 18 41w4d  3.81 kg (8 lb 6.4 oz) F Vag-Spont None N SUNIL      Name: KELL, PETE CONTRERAS      Apgar1: 9  Apgar5: 9   2 SAB            1 SAB              Past Medical History:   Diagnosis Date    Arthritis     neck     Past Surgical History:   Procedure Laterality Date    DILATION AND CURETTAGE OF UTERUS      Resection of Uterine Septum         PTA Medications   Medication Sig    Lactobacillus rhamnosus GG (CULTURELLE) 10 billion cell capsule Take 1 capsule by mouth once daily.    PRENATAL VIT CALC,IRON,FOLIC (PRENATAL VITAMIN ORAL) Take by mouth once daily.       Review of patient's allergies indicates:  No Known Allergies     Family History     Problem Relation (Age of Onset)    Cancer Sister    Diabetes Paternal Grandmother        Tobacco Use    Smoking status: Never Smoker    Smokeless tobacco: Never Used   Substance and Sexual Activity    Alcohol use: No     Comment: cooks with it    Drug use: No    Sexual activity: Yes     Partners: Male     Review of Systems   Constitutional:        Voices no complaints @ this time and appears in NAD   All other systems reviewed and are negative.     Objective:     Vital Signs (Most Recent):    Vital Signs  (24h Range):  BP: (118)/(80) 118/80        There is no height or weight on file to calculate BMI.    FHT:applied  TOCO: Applied    Physical Exam:   Constitutional: She is oriented to person, place, and time. She appears well-developed and well-nourished.        Pulmonary/Chest: Effort normal.        Abdominal: Soft.     Genitourinary:    Uterus normal.             Musculoskeletal: Normal range of motion and moves all extremeties.       Neurological: She is alert and oriented to person, place, and time.    Skin: Skin is warm and dry.    Psychiatric: She has a normal mood and affect. Her behavior is normal.       Cervix:  Deferred @ present     Significant Labs:  Lab Results   Component Value Date    GROUPTRH O POS 2019    HEPBSAG Negative 2019    STREPBCULT No Group B Streptococcus isolated 2020       I have personallly reviewed all pertinent lab results from the last 24 hours.    Assessment/Plan:     30 y.o. female  at 40w1d for:    No notes have been filed under this hospital service.  Service: Obstetrics and Gynecology      Erika Ferreira CNM  Obstetrics  [facility] -

## 2020-07-02 NOTE — NURSING
Fetal presentation Cephalic verified per bedside ultrasound. Monitors removed; pt able to move around on yoga ball and eat.

## 2020-07-02 NOTE — NURSING
CNM at bedside; bedside ultrasound performed to verify cephalic presentation. Cook balloon inserted 60/60. Pt tolerated well.

## 2020-07-02 NOTE — NURSING
Pt requesting to eat; CNM notified; will verify fetal presentation and if still head down, okay to eat now and then recheck cervix at 1630.

## 2020-07-02 NOTE — NURSING
Pt verbalized having bf issues with first infant possibly due to tongue tied issues; pt verbalized pumping exclusively for 10 months; pt very eager to exclusively breastfeed with this child.

## 2020-07-02 NOTE — NURSING
Discussed feeding choice with mother.  Reviewed benefits of breastfeeding and risks of formula feeding. Mother states her intention is to exclusively breastfeed; pt has bf prior.

## 2020-07-02 NOTE — NURSING
Pt up to unit for external cephalic version; pt denies pain; pt reports +fm; pt eager to delivery; pt verbalized being nervous if she has to get a c/s; pt educated on primary c/s procedure with spinal; pt hoping to have natural ;

## 2020-07-03 ENCOUNTER — ANESTHESIA (OUTPATIENT)
Dept: OBSTETRICS AND GYNECOLOGY | Facility: HOSPITAL | Age: 30
End: 2020-07-03

## 2020-07-03 ENCOUNTER — ANESTHESIA EVENT (OUTPATIENT)
Dept: OBSTETRICS AND GYNECOLOGY | Facility: HOSPITAL | Age: 30
End: 2020-07-03

## 2020-07-03 PROBLEM — Z34.90 ENCOUNTER FOR INDUCTION OF LABOR: Status: RESOLVED | Noted: 2020-07-03 | Resolved: 2020-07-03

## 2020-07-03 PROBLEM — O43.199 MARGINAL INSERTION OF UMBILICAL CORD AFFECTING MANAGEMENT OF MOTHER: Status: RESOLVED | Noted: 2020-03-20 | Resolved: 2020-07-03

## 2020-07-03 PROBLEM — O48.0 POST-TERM PREGNANCY, 40-42 WEEKS OF GESTATION: Status: RESOLVED | Noted: 2020-07-02 | Resolved: 2020-07-03

## 2020-07-03 PROBLEM — Z34.80 SUPERVISION OF OTHER NORMAL PREGNANCY: Status: RESOLVED | Noted: 2020-06-01 | Resolved: 2020-07-03

## 2020-07-03 PROBLEM — Z34.90 ENCOUNTER FOR INDUCTION OF LABOR: Status: ACTIVE | Noted: 2020-07-03

## 2020-07-03 PROCEDURE — 51701 INSERT BLADDER CATHETER: CPT

## 2020-07-03 PROCEDURE — 25000003 PHARM REV CODE 250: Performed by: NURSE ANESTHETIST, CERTIFIED REGISTERED

## 2020-07-03 PROCEDURE — 27200710 HC EPIDURAL INFUSION PUMP SET: Performed by: ANESTHESIOLOGY

## 2020-07-03 PROCEDURE — 62326 NJX INTERLAMINAR LMBR/SAC: CPT | Performed by: NURSE ANESTHETIST, CERTIFIED REGISTERED

## 2020-07-03 PROCEDURE — 25000003 PHARM REV CODE 250: Performed by: MIDWIFE

## 2020-07-03 PROCEDURE — 63600175 PHARM REV CODE 636 W HCPCS: Performed by: MIDWIFE

## 2020-07-03 PROCEDURE — 59400 OBSTETRICAL CARE: CPT | Mod: ,,, | Performed by: MIDWIFE

## 2020-07-03 PROCEDURE — 72200005 HC VAGINAL DELIVERY LEVEL II

## 2020-07-03 PROCEDURE — 11000001 HC ACUTE MED/SURG PRIVATE ROOM

## 2020-07-03 PROCEDURE — 59400 PR FULL ROUT OBSTE CARE,VAGINAL DELIV: ICD-10-PCS | Mod: ,,, | Performed by: MIDWIFE

## 2020-07-03 PROCEDURE — 72100003 HC LABOR CARE, EA. ADDL. 8 HRS

## 2020-07-03 PROCEDURE — 63600175 PHARM REV CODE 636 W HCPCS: Performed by: NURSE ANESTHETIST, CERTIFIED REGISTERED

## 2020-07-03 PROCEDURE — 27800516 HC TRAY, EPIDURAL COMBO: Performed by: ANESTHESIOLOGY

## 2020-07-03 RX ORDER — LIDOCAINE HYDROCHLORIDE AND EPINEPHRINE 15; 5 MG/ML; UG/ML
INJECTION, SOLUTION EPIDURAL
Status: DISCONTINUED | OUTPATIENT
Start: 2020-07-03 | End: 2020-07-04

## 2020-07-03 RX ORDER — DOCUSATE SODIUM 100 MG/1
200 CAPSULE, LIQUID FILLED ORAL 2 TIMES DAILY PRN
Status: DISCONTINUED | OUTPATIENT
Start: 2020-07-03 | End: 2020-07-05 | Stop reason: HOSPADM

## 2020-07-03 RX ORDER — HYDROCORTISONE 25 MG/G
CREAM TOPICAL 3 TIMES DAILY PRN
Status: DISCONTINUED | OUTPATIENT
Start: 2020-07-03 | End: 2020-07-05 | Stop reason: HOSPADM

## 2020-07-03 RX ORDER — ACETAMINOPHEN 325 MG/1
650 TABLET ORAL EVERY 6 HOURS PRN
Status: DISCONTINUED | OUTPATIENT
Start: 2020-07-03 | End: 2020-07-05 | Stop reason: HOSPADM

## 2020-07-03 RX ORDER — BUPIVACAINE HYDROCHLORIDE 2.5 MG/ML
INJECTION, SOLUTION EPIDURAL; INFILTRATION; INTRACAUDAL
Status: DISCONTINUED | OUTPATIENT
Start: 2020-07-03 | End: 2020-07-04

## 2020-07-03 RX ORDER — HYDROCODONE BITARTRATE AND ACETAMINOPHEN 5; 325 MG/1; MG/1
1 TABLET ORAL EVERY 6 HOURS PRN
Status: DISCONTINUED | OUTPATIENT
Start: 2020-07-03 | End: 2020-07-05 | Stop reason: HOSPADM

## 2020-07-03 RX ORDER — PRENATAL WITH FERROUS FUM AND FOLIC ACID 3080; 920; 120; 400; 22; 1.84; 3; 20; 10; 1; 12; 200; 27; 25; 2 [IU]/1; [IU]/1; MG/1; [IU]/1; MG/1; MG/1; MG/1; MG/1; MG/1; MG/1; UG/1; MG/1; MG/1; MG/1; MG/1
1 TABLET ORAL DAILY
Status: DISCONTINUED | OUTPATIENT
Start: 2020-07-04 | End: 2020-07-05 | Stop reason: HOSPADM

## 2020-07-03 RX ORDER — ONDANSETRON 8 MG/1
8 TABLET, ORALLY DISINTEGRATING ORAL EVERY 8 HOURS PRN
Status: DISCONTINUED | OUTPATIENT
Start: 2020-07-03 | End: 2020-07-05 | Stop reason: HOSPADM

## 2020-07-03 RX ORDER — ROPIVACAINE HYDROCHLORIDE 2 MG/ML
INJECTION, SOLUTION EPIDURAL; INFILTRATION; PERINEURAL
Status: DISCONTINUED | OUTPATIENT
Start: 2020-07-03 | End: 2020-07-04

## 2020-07-03 RX ORDER — DIPHENHYDRAMINE HYDROCHLORIDE 50 MG/ML
25 INJECTION INTRAMUSCULAR; INTRAVENOUS EVERY 4 HOURS PRN
Status: DISCONTINUED | OUTPATIENT
Start: 2020-07-03 | End: 2020-07-05 | Stop reason: HOSPADM

## 2020-07-03 RX ORDER — ROPIVACAINE HYDROCHLORIDE 2 MG/ML
INJECTION, SOLUTION EPIDURAL; INFILTRATION; PERINEURAL CONTINUOUS PRN
Status: DISCONTINUED | OUTPATIENT
Start: 2020-07-03 | End: 2020-07-04

## 2020-07-03 RX ORDER — OXYTOCIN/RINGER'S LACTATE 30/500 ML
95 PLASTIC BAG, INJECTION (ML) INTRAVENOUS ONCE
Status: COMPLETED | OUTPATIENT
Start: 2020-07-03 | End: 2020-07-03

## 2020-07-03 RX ORDER — IBUPROFEN 600 MG/1
600 TABLET ORAL EVERY 6 HOURS
Status: DISCONTINUED | OUTPATIENT
Start: 2020-07-03 | End: 2020-07-05 | Stop reason: HOSPADM

## 2020-07-03 RX ORDER — SIMETHICONE 80 MG
1 TABLET,CHEWABLE ORAL EVERY 6 HOURS PRN
Status: DISCONTINUED | OUTPATIENT
Start: 2020-07-03 | End: 2020-07-05 | Stop reason: HOSPADM

## 2020-07-03 RX ORDER — DIPHENHYDRAMINE HCL 25 MG
25 CAPSULE ORAL EVERY 4 HOURS PRN
Status: DISCONTINUED | OUTPATIENT
Start: 2020-07-03 | End: 2020-07-05 | Stop reason: HOSPADM

## 2020-07-03 RX ADMIN — SODIUM CHLORIDE, SODIUM LACTATE, POTASSIUM CHLORIDE, AND CALCIUM CHLORIDE 1000 ML: .6; .31; .03; .02 INJECTION, SOLUTION INTRAVENOUS at 01:07

## 2020-07-03 RX ADMIN — BUPIVACAINE HYDROCHLORIDE 10 ML: 2.5 INJECTION, SOLUTION EPIDURAL; INFILTRATION; INTRACAUDAL; PERINEURAL at 04:07

## 2020-07-03 RX ADMIN — Medication 95 MILLI-UNITS/MIN: at 06:07

## 2020-07-03 RX ADMIN — Medication 2 MILLI-UNITS/MIN: at 09:07

## 2020-07-03 RX ADMIN — LIDOCAINE HYDROCHLORIDE,EPINEPHRINE BITARTRATE 3 ML: 15; .005 INJECTION, SOLUTION EPIDURAL; INFILTRATION; INTRACAUDAL; PERINEURAL at 02:07

## 2020-07-03 RX ADMIN — ROPIVACAINE HYDROCHLORIDE 5 ML: 2 INJECTION, SOLUTION EPIDURAL; INFILTRATION at 02:07

## 2020-07-03 RX ADMIN — ROPIVACAINE HYDROCHLORIDE 14 ML/HR: 2 INJECTION, SOLUTION EPIDURAL; INFILTRATION at 02:07

## 2020-07-03 RX ADMIN — SODIUM CHLORIDE, SODIUM LACTATE, POTASSIUM CHLORIDE, AND CALCIUM CHLORIDE: .6; .31; .03; .02 INJECTION, SOLUTION INTRAVENOUS at 11:07

## 2020-07-03 RX ADMIN — BUPIVACAINE HYDROCHLORIDE 7 ML: 2.5 INJECTION, SOLUTION EPIDURAL; INFILTRATION; INTRACAUDAL; PERINEURAL at 03:07

## 2020-07-03 RX ADMIN — IBUPROFEN 600 MG: 600 TABLET, FILM COATED ORAL at 11:07

## 2020-07-03 NOTE — PROGRESS NOTES
Pt desiring unmedicated birth. Walking hallway with partner, appears comfortable. Has no questions

## 2020-07-03 NOTE — PROGRESS NOTES
"LABOR NOTE    S:  Pt resting comfortably with epidural, no complaints.  Family at bedside and supportive.     O: /75   Pulse 78   Temp 97.7 °F (36.5 °C) (Oral)   Resp 19   Ht 5' 9" (1.753 m)   Wt 81.6 kg (179 lb 14.3 oz)   LMP 2019 (Exact Date)   SpO2 100%   Breastfeeding No   BMI 26.57 kg/m²     GENERAL: Calm and appropriate affect  NEURO: Alert, oriented, normal speech  ABDOMEN: Nontender, Fundus palpates soft between UC's.  FHT: Baseline 145, moderate BTBV, positive accels, no decels. Cat 1, reassuring.  CTX: q 3-5 minutes  SVE: /-1       ASSESSMENT:   30 y.o.  IUP at 40w2d, FHT reassuring/ Cat 1    Patient Active Problem List   Diagnosis    Marginal insertion of umbilical cord    Supervision of other normal pregnancy    Breech presentation successfully converted, antepartum, not applicable or unspecified fetus    Post-term pregnancy, 40-42 weeks of gestation    Encounter for induction of labor         PLAN:  Continue close Maternal/Fetal monitoring  Pitocin Augmentation per protocol - currently infusing at 10mu  Recheck 2 hours or PRN    "

## 2020-07-03 NOTE — NURSING
Pt cervix is low; make sure to guard uterus with fundal rubs; pt bleeding moderate, pt uterus has been boggy and then firm after some moments of fundal rubbing; CNM aware; bolused in remaining bag of pitiocin from delivery; new bag hung at 95 mL per CNM.

## 2020-07-03 NOTE — NURSING
Pt requesting to stay off monitor until closer to when CNM breaks water; CNM in another delivery at the time; will put pt on monitors in a few minutes.

## 2020-07-03 NOTE — NURSING
CRNA called; pt experiencing right sharp pain around her hip that radiates around; pt reported pain and discomfort during SVE on the right side only; pt has been repositioned multiple times; request to give pt additional meds in epidural; KURT Duenas verified he will come and assess.

## 2020-07-03 NOTE — ANESTHESIA PREPROCEDURE EVALUATION
07/03/2020  Tawanna Harp is a 30 y.o., female.    Anesthesia Evaluation    I have reviewed the Patient Summary Reports.    I have reviewed the Nursing Notes.    I have reviewed the Medications.     Review of Systems  Anesthesia Hx:  No previous Anesthesia    Hematology/Oncology:  Hematology Normal   Oncology Normal     EENT/Dental:EENT/Dental Normal   Cardiovascular:   Exercise tolerance: good NYHA Classification I    Pulmonary:  Pulmonary Normal    Renal/:  Renal/ Normal     Hepatic/GI:  Hepatic/GI Normal    Musculoskeletal:  Musculoskeletal Normal    Neurological:  Neurology Normal    Endocrine:  Endocrine Normal    Dermatological:  Skin Normal    Psych:  Psychiatric Normal           Physical Exam  General:  Well nourished    Airway/Jaw/Neck:  Airway Findings: Mouth Opening: Normal Tongue: Normal  General Airway Assessment: Adult  Mallampati: II  Improves to I with phonation.  TM Distance: Normal, at least 6 cm        Eyes/Ears/Nose:  EYES/EARS/NOSE FINDINGS: Normal   Dental:  DENTAL FINDINGS: Normal   Chest/Lungs:  Chest/Lungs Findings: Clear to auscultation, Normal Respiratory Rate     Heart/Vascular:  Heart Findings: Rate: Normal  Rhythm: Regular Rhythm     Abdomen:  Abdomen Findings: Normal    Musculoskeletal:  Musculoskeletal Findings: Normal   Skin:  Skin Findings: Normal    Mental Status:  Mental Status Findings:  Cooperative, Alert and Oriented         Anesthesia Plan  Type of Anesthesia, risks & benefits discussed:  Anesthesia Type:  epidural  Patient's Preference:   Intra-op Monitoring Plan:   Intra-op Monitoring Plan Comments:   Post Op Pain Control Plan: multimodal analgesia  Post Op Pain Control Plan Comments:   Induction:    Beta Blocker:  Patient is not currently on a Beta-Blocker (No further documentation required).       Informed Consent: Patient understands risks and  agrees with Anesthesia plan.  Questions answered. Anesthesia consent signed with patient.  ASA Score: 2     Day of Surgery Review of History & Physical:    H&P update referred to the surgeon.         Ready For Surgery From Anesthesia Perspective.

## 2020-07-03 NOTE — SUBJECTIVE & OBJECTIVE
Interval History:  Tawanna is a 30 y.o.  at 40w2d. She is doing well.     Objective:     Vital Signs (Most Recent):  Temp: 97.8 °F (36.6 °C) (20 06)  Pulse: 78 (20 0641)  Resp: 17 (20 0715)  BP: 114/84 (2038)  SpO2: 100 % (20) Vital Signs (24h Range):  Temp:  [97.8 °F (36.6 °C)-98.5 °F (36.9 °C)] 97.8 °F (36.6 °C)  Pulse:  [62-99] 78  Resp:  [16-19] 17  SpO2:  [96 %-100 %] 100 %  BP: (108-125)/(48-84) 114/84     Weight: 81.6 kg (179 lb 14.3 oz)  Body mass index is 26.57 kg/m².    FHT: Cat 1 (reassuring)  TOCO:  Q 3-5 minutes    Cervical Exam:  Dilation:  3  Effacement:  70%  Station: -2  Presentation: Vertex, AROM, clear, mod amt     Significant Labs:  Lab Results   Component Value Date    GROUPTRH O POS 2020    HEPBSAG Negative 2019    STREPBCULT No Group B Streptococcus isolated 2020       I have personallly reviewed all pertinent lab results from the last 24 hours.  Recent Lab Results     None          Physical Exam:   Constitutional: She is oriented to person, place, and time. She appears well-developed and well-nourished.    HENT:   Head: Normocephalic.     Neck: Normal range of motion.     Pulmonary/Chest: Effort normal.        Abdominal: Soft.   gravid             Musculoskeletal: Normal range of motion and moves all extremeties.       Neurological: She is alert and oriented to person, place, and time.    Skin: Skin is warm and dry.    Psychiatric: She has a normal mood and affect. Her behavior is normal. Judgment and thought content normal.

## 2020-07-03 NOTE — LACTATION NOTE
This note was copied from a baby's chart.  Lactation Rounds:     Visited mother at bedside. Infant was feeding intermittently at the breast at time of visit. Mother stated that infant had been more vigorous at start of feeding and was beginning to slow down. Mother reported pumping for 10 months for her previous child due to suspected oral restriction. She stated that she intends to pursue more aggressive intervention if needed this time.    Admit teaching done, including feeding on demand, recognition of feeding cues, expectations for infant feeding/behavior in first day of life, importance of skin to skin contact, hand expression (mother reported extensive experience with this), risks of artificial nipples and pacifiers. Lactation phone number was provided with encouragement to call with any questions or concerns or for observation of/assistance with next feeding.

## 2020-07-03 NOTE — PROGRESS NOTES
S:  Tolerating pitocin and contractions well  O:  VS reviewed, afebrile   Vitals:    07/02/20 1831 07/02/20 1900 07/02/20 2000 07/02/20 2100   BP:       Pulse: 97 86 80 76   Resp:   18    Temp:   98 °F (36.7 °C)    TempSrc:   Oral    SpO2: 97% 98% 98% 98%   Weight:       Height:           FHTs:  CAT 1-2 reassuring  UC: q 2-4, pitocin @ 12 mu  SVE:  1935 cooks catheter out cervix 5cm, VTX @ -1    A: IUP @ 40w1d ;     Patient Active Problem List   Diagnosis    Marginal insertion of umbilical cord    Breech presentation    Supervision of other normal pregnancy    Breech presentation successfully converted, antepartum, not applicable or unspecified fetus    Post-term pregnancy, 40-42 weeks of gestation       P:   Continue close monitoring of maternal/fetal status

## 2020-07-03 NOTE — PROGRESS NOTES
[facility]  Obstetrics  Labor Progress Note    Patient Name: Tawanna Harp  MRN: [id number]  Admission Date: 2020  Hospital Length of Stay: 1 days  Attending Physician: Stephani Allred MD  Primary Care Provider: Primary Doctor No    Subjective:     Principal Problem:Encounter for induction of labor    Hospital Course:  Admit for delivery  7/3/20--AROM, clear fluid, 3/70/-2, restart pitocin    Interval History:  Tawanna is a 30 y.o.  at 40w2d. She is doing well.     Objective:     Vital Signs (Most Recent):  Temp: 97.8 °F (36.6 °C) (20)  Pulse: 78 (20)  Resp: 17 (20)  BP: 114/84 (20)  SpO2: 100 % (20) Vital Signs (24h Range):  Temp:  [97.8 °F (36.6 °C)-98.5 °F (36.9 °C)] 97.8 °F (36.6 °C)  Pulse:  [62-99] 78  Resp:  [16-19] 17  SpO2:  [96 %-100 %] 100 %  BP: (108-125)/(48-84) 114/84     Weight: 81.6 kg (179 lb 14.3 oz)  Body mass index is 26.57 kg/m².    FHT: Cat 1 (reassuring)  TOCO:  Q 3-5 minutes    Cervical Exam:  Dilation:  3  Effacement:  70%  Station: -2  Presentation: Vertex, AROM, clear, mod amt     Significant Labs:  Lab Results   Component Value Date    GROUPTRH O POS 2020    HEPBSAG Negative 2019    STREPBCULT No Group B Streptococcus isolated 2020       I have personallly reviewed all pertinent lab results from the last 24 hours.  Recent Lab Results     None          Physical Exam:   Constitutional: She is oriented to person, place, and time. She appears well-developed and well-nourished.    HENT:   Head: Normocephalic.     Neck: Normal range of motion.     Pulmonary/Chest: Effort normal.        Abdominal: Soft.   gravid             Musculoskeletal: Normal range of motion and moves all extremeties.       Neurological: She is alert and oriented to person, place, and time.    Skin: Skin is warm and dry.    Psychiatric: She has a normal mood and affect. Her behavior is normal. Judgment and thought  content normal.       Assessment/Plan:     30 y.o. female  at 40w2d for:    * Encounter for induction of labor  IOL for unstable lie of fetus    Breech presentation successfully converted, antepartum, not applicable or unspecified fetus  Successful version, IOL          Guillermo Fernandez, JEAN  Obstetrics  [facility]

## 2020-07-03 NOTE — NURSING
ROMY Abel CNM at bedside; pt requesting to eat; plan of care discussed with pt and spouse; plan is to d/c pit, take a shower, eat breakfast, and then resume pit and break water.

## 2020-07-03 NOTE — ANESTHESIA PROCEDURE NOTES
Epidural    Patient location during procedure: OB   Reason for block: primary anesthetic   Diagnosis: iup,labor    Start time: 7/3/2020 2:09 PM  Timeout: 7/3/2020 2:08 PM  End time: 7/3/2020 2:22 PM    Staffing  Performing Provider: Gera Duenas Jr., CRNA  Authorizing Provider: Cristina Last MD        Preanesthetic Checklist  Completed: patient identified, site marked, surgical consent, pre-op evaluation, timeout performed, IV checked, risks and benefits discussed, monitors and equipment checked, anesthesia consent given, hand hygiene performed and patient being monitored  Preparation  Patient position: sitting  Prep: Betadine  Patient monitoring: Pulse Ox and Blood Pressure  Epidural  Skin Anesthetic: lidocaine 1%  Skin Wheal: 3 mL  Administration type: continuous  Approach: midline  Interspace: L3-4    Injection technique: TROY air  Needle and Epidural Catheter  Needle type: Tuohy   Needle gauge: 17  Needle length: 3.5 inches  Needle insertion depth: 4 cm  Catheter type: springwound and multi-orifice  Catheter size: 18 G  Catheter at skin depth: 12 cm  Test dose: 3 mL of lidocaine 1.5% with Epi 1-to-200,000  Additional Documentation: incremental injection, negative aspiration for heme and CSF, no paresthesia on injection, no signs/symptoms of IV or SA injection, no significant pain on injection and no significant complaints from patient  Needle localization: anatomical landmarks  Medications:  Volume per aspiration: 0 mL  Time between aspirations: 2 minutes  Assessment  Upper dermatomal levels - Left: T10  Right: T9   Dermatomal levels determined by alcohol wipe  Ease of block: easy  Patient's tolerance of the procedure: comfortable throughout block and no complaintsNo inadvertent dural puncture with Tuohy.  Dural puncture not performed with spinal needle

## 2020-07-03 NOTE — L&D DELIVERY NOTE
[facility] -   Vaginal Delivery   Operative Note    SUMMARY     Normal spontaneous vaginal delivery of live infant, was placed on mothers abdomen for skin to skin and bulb suctioning performed.  Infant delivered position OA over intact perineum.  Nuchal cord: No.    Spontaneous delivery of placenta and IV pitocin given noting good uterine tone.  No lacerations noted.  Patient tolerated delivery well.   Indications:  (spontaneous vaginal delivery)  Pregnancy complicated by:   Patient Active Problem List   Diagnosis     (spontaneous vaginal delivery)    Delivery outcome of single liveborn infant     Admitting GA: 40w2d    Delivery Information for Mark Harp    Birth information:  YOB: 2021   Time of birth: 5:12 PM   Sex: female   Head Delivery Date/Time: 7/3/2020  5:11 PM   Delivery type: Vaginal, Spontaneous   Gestational Age: 40w2d    Delivery Providers    Delivering clinician: Guillermo Fernandez CNM   Provider Role    Missy Holm RN Registered Nurse    Yun Villarreal RN Registered Nurse    Bernarda Adler RN Registered Nurse    Ish Amaya Medical Student            Measurements    Weight:   Length:          Apgars    Living status: Living  Apgars:  1 min.:  5 min.:  10 min.:  15 min.:  20 min.:    Skin color:  1  1       Heart rate:  2  2       Reflex irritability:  2  2       Muscle tone:  2  2       Respiratory effort:  2  2       Total:  9  9       Apgars assigned by: EMERSON VILLARREAL RN         Operative Delivery    Forceps attempted?: No  Vacuum extractor attempted?: No         Shoulder Dystocia    Shoulder dystocia present?: No           Presentation    Presentation: Vertex           Interventions/Resuscitation    Method: Bulb Suctioning, Tactile Stimulation, Deep Suctioning       Cord    Vessels: 3 vessels  Complications: None  Delayed Cord Clamping?: Yes  Cord Clamped Date/Time: 7/3/2020  5:15 PM  Cord Blood Disposition: Lab  Gases Sent?: No        Placenta    Placenta delivery date/time: 7/3/2020 1723  Placenta removal: Spontaneous  Placenta appearance: Intact  Placenta disposition: discarded           Labor Events:       labor: No     Labor Onset Date/Time:         Dilation Complete Date/Time:         Start Pushing Date/Time:         Start Pushing Date/Time:       Rupture Date/Time: 20  0939         Rupture type:          Fluid Amount: Small      Fluid Color: Clear      Fluid Odor:       Membrane Status: ARM (Artificial Rupture)               steroids: None     Antibiotics given for GBS: No     Induction: oxytocin     Indications for induction:  Post-term Gestation     Augmentation: amniotomy     Indications for augmentation: Ineffective Contraction Pattern     Labor complications: None     Additional complications:          Cervical ripenin2020 10:01 AM      Misoprostol          Delivery:      Episiotomy: None     Indication for Episiotomy:       Perineal Lacerations: None Repaired:      Periurethral Laceration:   Repaired:     Labial Laceration:   Repaired:     Sulcus Laceration:   Repaired:     Vaginal Laceration:   Repaired:     Cervical Laceration:   Repaired:     Repair suture:       Repair # of packets:       Last Value - EBL - Nursing (mL): 0     Sum - EBL - Nursing (mL): 0     Last Value - EBL - Anesthesia (mL):      Calculated QBL (mL):       Vaginal Sweep Performed:       Surgicount Correct:         Other providers:       Anesthesia    Method: Epidural          Details (if applicable):  Trial of Labor      Categorization:      Priority:     Indications for :     Incision Type:       Additional  information:  Forceps:    Vacuum:    Breech:    Observed anomalies    Other (Comments):

## 2020-07-04 PROCEDURE — 25000003 PHARM REV CODE 250: Performed by: MIDWIFE

## 2020-07-04 PROCEDURE — 11000001 HC ACUTE MED/SURG PRIVATE ROOM

## 2020-07-04 RX ADMIN — PRENATAL VITAMINS-IRON FUMARATE 27 MG IRON-FOLIC ACID 0.8 MG TABLET 1 TABLET: at 08:07

## 2020-07-04 RX ADMIN — IBUPROFEN 600 MG: 600 TABLET, FILM COATED ORAL at 05:07

## 2020-07-04 RX ADMIN — IBUPROFEN 600 MG: 600 TABLET, FILM COATED ORAL at 11:07

## 2020-07-04 NOTE — LACTATION NOTE
Lactation Rounds.    Infant at the R breast in cross cradle hold.  Mother has her hand on infant's head and is leaning over baby.  Assisted to move mother to a reclined position and pillows used to support infant closer to mother.  Mother reports this position makes the latch more comfortable.    Mother reports that she has been noticing her nipples are compressed upon un-latching.  She c/o pain while feeding throughout the night.  Mother expresses concern about the baby's labial frenulum and discusses how her older daughter had similar anatomy. She exclusively pumped for ten months d/t pain upon latching and desires a different outcome with this baby.    Discussed the availability of the [facility] feeding team.  Mother states she will discuss a referral with her pediatrician.      Mother inquires about a nipple shield.  Discussed the risks and benefits and mother verbalizes understanding. Advised that one will be made available to her upon her informed request.      Lactation contact information left.  Encouraged mother to call for the next feeding.

## 2020-07-04 NOTE — SUBJECTIVE & OBJECTIVE
Interval History: PPD #1    She is doing well this morning. She is tolerating a regular diet without nausea or vomiting. She is voiding spontaneously. She is ambulating. She has passed flatus, and has not a BM. Vaginal bleeding is mild. She denies fever or chills. Abdominal pain is mild and controlled with oral medications. She is breastfeeding. She desires circumcision for her male baby: not applicable.    Objective:     Vital Signs (Most Recent):  Temp: 98.1 °F (36.7 °C) (07/04/20 0803)  Pulse: 70 (07/04/20 0803)  Resp: 18 (07/04/20 0803)  BP: 109/67 (07/04/20 0803)  SpO2: 99 % (07/04/20 0420) Vital Signs (24h Range):  Temp:  [97.2 °F (36.2 °C)-98.5 °F (36.9 °C)] 98.1 °F (36.7 °C)  Pulse:  [] 70  Resp:  [16-21] 18  SpO2:  [95 %-100 %] 99 %  BP: (101-137)/(53-92) 109/67     Weight: 81.6 kg (179 lb 14.3 oz)  Body mass index is 26.57 kg/m².      Intake/Output Summary (Last 24 hours) at 7/4/2020 0842  Last data filed at 7/4/2020 0422  Gross per 24 hour   Intake 3129.05 ml   Output 2150 ml   Net 979.05 ml           Significant Labs:  Lab Results   Component Value Date    GROUPTRH O POS 07/02/2020    HEPBSAG Negative 12/18/2019    STREPBCULT No Group B Streptococcus isolated 06/08/2020     No results for input(s): HGB, HCT in the last 48 hours.    Recent Lab Results     None          Physical Exam:   Constitutional: She is oriented to person, place, and time. She appears well-developed and well-nourished.      Neck: Normal range of motion.    Cardiovascular: Normal rate.     Pulmonary/Chest: Effort normal.        Abdominal: Soft.   Fundus firm below umbilicus     Genitourinary:    Genitourinary Comments: Small lochia             Musculoskeletal: Normal range of motion and moves all extremeties.       Neurological: She is alert and oriented to person, place, and time.    Skin: Skin is warm and dry.    Psychiatric: She has a normal mood and affect. Her behavior is normal.

## 2020-07-04 NOTE — PROGRESS NOTES
[facility] -   Obstetrics  Postpartum Progress Note    Patient Name: Tawanna Harp  MRN: [id number]  Admission Date: 2020  Hospital Length of Stay: 2 days  Attending Physician: Stephani Allred MD  Primary Care Provider: Primary Doctor No    Subjective:     Principal Problem: (spontaneous vaginal delivery)    Hospital Course:  Admit for delivery  7/3/20--AROM, clear fluid, 3/70/2, restart pitocin  20 PPD #1 routine PP care, breastfeeding    Interval History: PPD #1    She is doing well this morning. She is tolerating a regular diet without nausea or vomiting. She is voiding spontaneously. She is ambulating. She has passed flatus, and has not a BM. Vaginal bleeding is mild. She denies fever or chills. Abdominal pain is mild and controlled with oral medications. She is breastfeeding. She desires circumcision for her male baby: not applicable.    Objective:     Vital Signs (Most Recent):  Temp: 98.1 °F (36.7 °C) (20 0803)  Pulse: 70 (20 0803)  Resp: 18 (20 0803)  BP: 109/67 (20 0803)  SpO2: 99 % (20 0420) Vital Signs (24h Range):  Temp:  [97.2 °F (36.2 °C)-98.5 °F (36.9 °C)] 98.1 °F (36.7 °C)  Pulse:  [] 70  Resp:  [16-21] 18  SpO2:  [95 %-100 %] 99 %  BP: (101-137)/(53-92) 109/67     Weight: 81.6 kg (179 lb 14.3 oz)  Body mass index is 26.57 kg/m².      Intake/Output Summary (Last 24 hours) at 2020 0842  Last data filed at 2020 0422  Gross per 24 hour   Intake 3129.05 ml   Output 2150 ml   Net 979.05 ml           Significant Labs:  Lab Results   Component Value Date    GROUPTRH O POS 2020    HEPBSAG Negative 2019    STREPBCULT No Group B Streptococcus isolated 2020     No results for input(s): HGB, HCT in the last 48 hours.    Recent Lab Results     None          Physical Exam:   Constitutional: She is oriented to person, place, and time. She appears well-developed and well-nourished.      Neck: Normal range of motion.     Cardiovascular: Normal rate.     Pulmonary/Chest: Effort normal.        Abdominal: Soft.   Fundus firm below umbilicus     Genitourinary:    Genitourinary Comments: Small lochia             Musculoskeletal: Normal range of motion and moves all extremeties.       Neurological: She is alert and oriented to person, place, and time.    Skin: Skin is warm and dry.    Psychiatric: She has a normal mood and affect. Her behavior is normal.       Assessment/Plan:     30 y.o. female  for:    *  (spontaneous vaginal delivery)  PPD #1 routine PP care    Delivery outcome of single liveborn infant  Baby at bedside, breastfeeding        Disposition: As patient meets milestones, will plan to discharge tomorrow.    Elaina Huff CNM  Obstetrics  [facility]

## 2020-07-04 NOTE — PLAN OF CARE
Patient afebrile and had no falls this shift. Fundus firm without massage and below umbilicus. Bleeding light to scant, no clots passed this shift. Voids spontaneously. Ambulates independently. Pain well controlled with oral pain medication. Vital signs stable at this time. Bonding well with infant; responds to infant cues and participates in infant care. Will continue to monitor.

## 2020-07-04 NOTE — LACTATION NOTE
Lactation Rounds.    Mother seated in bed with infant at R breast.  She reports that latching has been more comfortable this afternoon and she has been able to hear swallows during this feeding.  No further questions or concerns at this time, mother will call as assistance is desired.

## 2020-07-04 NOTE — LACTATION NOTE
"This note was copied from a baby's chart.  Before latching, nipples appear already damaged. Reviewed nipple care with colostrum/breastmilk and nipple cream. When infant latched to breast, mother visibly uncomfortable. She stated that the infant "bites" frequently while suckling, causing pain. Reviewed suck training with mother. Encouraged mother to pump when/if it is too painful to latch infant to breast to protect milk supply.     Mother was taught hand expression of breastmilk/colostrum. She was instructed to:   Sit upright and lean forward, if possible.   When feasible, apply warm, wet compress over breasts for a few minutes.    Perform gentle breast massage.   Form a C with her hand and place it about 1 inch back from the areola with the nipple centered between her index finger and her thumb.   Press, compress, relax:  Using her finger and thumb, apply pressure in an inward direction toward the breast without stretching the tissue, compress the breast tissue between her finger and thumb, then relax her finger and thumb. Repeat process for a few minutes.   Rotate placement of finger and thumb on the breasts to facilitate emptying.   Collect expressed breastmilk/colostrum with a spoon or cup and feed immediately to the baby, if able.    Offered to assist with hand-expression, mother stated she knows how to do it. Encouraged mother to hand-express after each feeding and/or pumping session and to feed infant what she collects. Mother verbalized understanding and agreed to feeding plan.  Reviewed syringe feeding as well, states she already knows how to do it. Verbally reviewed safe syringe feeding, encouraged her to call if/when she does syringe feeding. Verbalized understanding. Will continue to monitor.  "

## 2020-07-04 NOTE — LACTATION NOTE
Lactation Rounds:     Visited mother at bedside. She was breastfeeding infant in right cross-cradle hold with pillow support. Mother stated that her nipple pain is stable. Reviewed nipple care. Reviewed plan of care for infant feeding tonight. Mother verbalized knowledge of plan and denied questions or concerns at this time.

## 2020-07-04 NOTE — LACTATION NOTE
"To room as primary nurse advises infant BG = 41 mg/dL.  Mother had infant at the breast, hands to father at the bedside.  Mother states that the sucking pattern has seemed "less bitey" and more wavelike for the past feedings. However, infant does not seem satisfied between feedings.  Impression: milk transfer at the breast is ineffective.    Assisted mother with hand expression of both breast x5 minutes.  Mother finds it difficult to tolerate and reports her breasts are quite tender.  Yield = 0.1 mL.    Parents prefer syringe feeding to supplement.  15mL of infant formula fed to infant via syringe with teaching and demo to father.  Infant appears satisfied and sleeps in fathers arms.    Breasts:  R nipple is peeling, with abrasion noted around the base of the nipple from 1 o'clock to 6 o'clock.  L nipple abrasion noted to central nipple.      Infant:   Suck is strong, organized.  Tongue has difficulty cupping a gloved finger and does not maintain suction throughout syringe feeding.  No milk leakage noted, oral seal adequate.  Labial frenulum is thick, ending at notched gums.  Gums mariam on passive eversion, upper lip everts easily.  Thick, inelastic lingual frenulum noted.  Tongue observed extending to lips, elevates minimally off the floor of the mouth, lateralizes minimally.    Gag reflex is strong with minimal stimulation to palate.    Assisted mother to pump on "initiate" mode with 24mm flanges.  Drops observed.  Advised mother to pump 8 times in a 24 hour period.    Encouraged limited time at the breast in order to minimalize nipple pain/damge and to prevent frustration in infant from poor transfer.      Lactation contact information remains and parents encouraged to call for the next feeding.    "

## 2020-07-04 NOTE — LACTATION NOTE
This note was copied from a baby's chart.  Supplementation is medically indicated at this time due to hypoglycemia. Discussed with mother preferred alternative feeding methods, such as SNS, syringe, spoon, cup and finger feeding. Discussed risks and encouraged mother to avoid artificial nipples and bottles. Mother chooses to supplement infant via syringe. Mother taught how to safely feed infant via this method. Demonstrated by nurse and mother return demonstrates proper and safe usage.   Because baby is being supplemented away from the breast, mother was:   - informed that breastfeeding support and assistance is available as needed  - encouraged to express milk from both breasts each time a supplement is given  - encouraged to use her own collected milk as a first choice for supplementation  Mother was encouraged to request assistance as needed and voices understanding.

## 2020-07-04 NOTE — ANESTHESIA POSTPROCEDURE EVALUATION
Anesthesia Post Evaluation    Patient: Tawanna Harp    Procedure(s) Performed: * No procedures listed *    Final Anesthesia Type: epidural    Patient location during evaluation: labor & delivery  Patient participation: Yes- Able to Participate  Level of consciousness: awake and alert  Post-procedure vital signs: reviewed and stable  Pain management: adequate  Airway patency: patent    PONV status at discharge: No PONV  Anesthetic complications: no      Cardiovascular status: blood pressure returned to baseline  Respiratory status: unassisted and spontaneous ventilation  Hydration status: euvolemic  Follow-up not needed.          Vitals Value Taken Time   /67 07/04/20 0420   Temp 36.8 °C (98.2 °F) 07/04/20 0420   Pulse 65 07/04/20 0420   Resp 18 07/04/20 0420   SpO2 99 % 07/04/20 0420         No case tracking events are documented in the log.      Pain/Lise Score: Pain Rating Prior to Med Admin: 0 (7/3/2020 11:50 PM)  Pain Rating Post Med Admin: 0 (7/4/2020 12:45 AM)

## 2020-07-05 VITALS
SYSTOLIC BLOOD PRESSURE: 118 MMHG | WEIGHT: 179.88 LBS | HEART RATE: 70 BPM | OXYGEN SATURATION: 99 % | HEIGHT: 69 IN | TEMPERATURE: 98 F | BODY MASS INDEX: 26.64 KG/M2 | DIASTOLIC BLOOD PRESSURE: 68 MMHG | RESPIRATION RATE: 18 BRPM

## 2020-07-05 PROCEDURE — 99024 POSTOP FOLLOW-UP VISIT: CPT | Mod: ,,, | Performed by: ADVANCED PRACTICE MIDWIFE

## 2020-07-05 PROCEDURE — 25000003 PHARM REV CODE 250: Performed by: MIDWIFE

## 2020-07-05 PROCEDURE — 99024 PR POST-OP FOLLOW-UP VISIT: ICD-10-PCS | Mod: ,,, | Performed by: ADVANCED PRACTICE MIDWIFE

## 2020-07-05 RX ORDER — IBUPROFEN 600 MG/1
600 TABLET ORAL EVERY 6 HOURS
Qty: 30 TABLET
Start: 2020-07-05 | End: 2020-10-12 | Stop reason: ALTCHOICE

## 2020-07-05 RX ADMIN — DOCUSATE SODIUM 200 MG: 100 CAPSULE, LIQUID FILLED ORAL at 08:07

## 2020-07-05 RX ADMIN — PRENATAL VITAMINS-IRON FUMARATE 27 MG IRON-FOLIC ACID 0.8 MG TABLET 1 TABLET: at 08:07

## 2020-07-05 RX ADMIN — IBUPROFEN 600 MG: 600 TABLET, FILM COATED ORAL at 06:07

## 2020-07-05 RX ADMIN — IBUPROFEN 600 MG: 600 TABLET, FILM COATED ORAL at 12:07

## 2020-07-05 NOTE — ASSESSMENT & PLAN NOTE
NVD of viable female infant  Care per peds  Lactation consult due to possible tongue/lip tie, pt breastfeeding and pumping

## 2020-07-05 NOTE — DISCHARGE INSTRUCTIONS
"YOU MAY GET HELP AFTER DISCHARGE     CALL LACTATION NURSE- WARM LINE  [phone]    CALL LABOR AND DELIVERY NURSE 24 HR A DAY / NIGHT  [phone]    CALL THE [facility] OFFICE     CALL THE BABY DOCTOR [phone]       CALL YOUR MID WIFE  OR OB DOCTOR  Mother Self Care:    Activity: Avoid strenuous exercise and get adequate rest.  No driving until the physician consent given.  Emotional Changes: Most women find birth to be a time of great emotional upheaval.  Sense of loss, mood swings, fatigue, anxiety, and feeling "let down" are common.  If feelings worsen or last more than a week, call your physician.  Breast Care/Breastfeeding: Wear a bra for comfort.  Keep nipples dry and apply your own breast milk or lanolin cream as needed for soreness.  Engorgement can be relieved with warm, moist heat before feedings.  You may also take Ibuprofen.  Breast Care/Bottle Feeding: Wear support bra 24 hours a day for one week.  Avoid stimulation to breasts.  You may use ice packs for discomfort.  Rudolph-Care/Vaginal Bleeding: Remember to use your rudolph-bottle after urinating.  Your flow will change from red, to pink, to yellow/white color over a period of 2 weeks.  Menstruation will return in 3-8 weeks, or longer if breastfeeding.  Episiotomy Vaginal Delivery: Stitches will dissolve within 10 days to 3 weeks.  Warm baths, tucks, and dermoplast will promote healing.  Avoid bubble baths or strong soaps.   Section/Tubal Ligation: Keep incision clean and dry. You may shower, but avoid baths.  Sexual Activity/Pelvic Rest: No sexual activity, tampons, or douching until your physician gives you consent.  Diet: Continue to eat from the five basic food groups, including plenty of protein, fruits, vegetables, and whole grains.  Limit empty calories and high fat foods.  Drink enough fluids to satisfy thirst and add an extra 500 calories for breastfeeding.  Constipation/Hemorrhoids: Drink plenty of water.  You may take a stool softener or " natural laxative (Metamucil). You may use tucks or hemorrhoid ointment and soak in a warm tub.    CALL YOUR OB DOCTOR IF ANY OF THE FOLLOWING OCCURS:  *Heavy bleeding - saturating a pad an hour or passing any large (2-3 inches in size) blood clots.  *Any pain, redness, or tenderness in lower leg.  *You cannot care for yourself or your baby.  *Any signs of infection-      - Temperature greater than 100.5 degrees F      - Foul smelling vaginal discharge and/or incisional drainage      - Increased episiotomy or incisional pain      - Hot, hard, red or sore area on breast      - Flu-like symptoms      - Any urgency, frequency or burning with urination    Return To the Hospital for further Evaluation:  · Headache not relieved by tylenol or ibuprofen  · Blurry vision, double vision, seeing spots, or flashing lights  · Feeling faint or passing out  · Right epigastric pain  · Difficulty breathing  · Swelling in hands, face, or feet  · Any of these symptoms accompanied by nausea/vomiting  · Gaining more than 5 pounds in one week  · Seizures  These symptoms could be an indication of elevated blood pressure.       If you have any questions that need to be answered immediately please call the Labor & Delivery Unit at [phone] and ask to speak to a nurse.

## 2020-07-05 NOTE — SUBJECTIVE & OBJECTIVE
Interval History:     She is doing well this morning. She is tolerating a regular diet without nausea or vomiting. She is voiding spontaneously. She is ambulating. Vaginal bleeding is mild. She denies fever or chills. Abdominal pain is mild and controlled with oral medications. She is breastfeeding but reports issues with latch, possible tongue/lip tie in infant. Will consult with lactation. Is also pumping. She desires circumcision for her male baby: not applicable.    Objective:     Vital Signs (Most Recent):  Temp: 97.9 °F (36.6 °C) (07/05/20 0733)  Pulse: 75 (07/05/20 0733)  Resp: 18 (07/05/20 0733)  BP: 111/67 (07/05/20 0733)  SpO2: 99 % (07/04/20 0420) Vital Signs (24h Range):  Temp:  [97.9 °F (36.6 °C)-98.5 °F (36.9 °C)] 97.9 °F (36.6 °C)  Pulse:  [61-75] 75  Resp:  [16-18] 18  BP: (111-122)/(67-78) 111/67     Weight: 81.6 kg (179 lb 14.3 oz)  Body mass index is 26.57 kg/m².    No intake or output data in the 24 hours ending 07/05/20 0815        Significant Labs:  Lab Results   Component Value Date    GROUPTRH O POS 07/02/2020    HEPBSAG Negative 12/18/2019    STREPBCULT No Group B Streptococcus isolated 06/08/2020     No results for input(s): HGB, HCT in the last 48 hours.    I have personallly reviewed all pertinent lab results from the last 24 hours.    Physical Exam:   Constitutional: She is oriented to person, place, and time. She appears well-developed and well-nourished.    HENT:   Head: Normocephalic.     Neck: Normal range of motion.    Cardiovascular: Normal rate.     Pulmonary/Chest: Effort normal.        Abdominal: Soft.   Non-Tender             Musculoskeletal: Normal range of motion and moves all extremeties.       Neurological: She is alert and oriented to person, place, and time.    Skin: Skin is warm and dry.    Psychiatric: She has a normal mood and affect. Her behavior is normal. Judgment and thought content normal.

## 2020-07-05 NOTE — PROGRESS NOTES
[facility] -   Obstetrics  Postpartum Progress Note    Patient Name: Tawanna Harp  MRN: [id number]  Admission Date: 2020  Hospital Length of Stay: 3 days  Attending Physician: Stephani Allred MD  Primary Care Provider: Primary Doctor No    Subjective:     Principal Problem: (spontaneous vaginal delivery)    Hospital Course:  Admit for delivery  7/3/20--AROM, clear fluid, 3/70/2, restart pitocin  20 PPD #1 routine PP care, breastfeeding  20 PPD2, Discharge instructions reviewed including PIH S&S, PPD, pain management and bleeding precautions, Will have f/u for tongue and lip revision for infant    Interval History:     She is doing well this morning. She is tolerating a regular diet without nausea or vomiting. She is voiding spontaneously. She is ambulating. Vaginal bleeding is mild. She denies fever or chills. Abdominal pain is mild and controlled with oral medications. She is breastfeeding but reports issues with latch, possible tongue/lip tie in infant. Will consult with lactation. Is also pumping. She desires circumcision for her male baby: not applicable.    Objective:     Vital Signs (Most Recent):  Temp: 97.9 °F (36.6 °C) (20)  Pulse: 75 (20)  Resp: 18 (20)  BP: 111/67 (20)  SpO2: 99 % (20 0420) Vital Signs (24h Range):  Temp:  [97.9 °F (36.6 °C)-98.5 °F (36.9 °C)] 97.9 °F (36.6 °C)  Pulse:  [61-75] 75  Resp:  [16-18] 18  BP: (111-122)/(67-78) 111/67     Weight: 81.6 kg (179 lb 14.3 oz)  Body mass index is 26.57 kg/m².    No intake or output data in the 24 hours ending 20 0815        Significant Labs:  Lab Results   Component Value Date    GROUPTRH O POS 2020    HEPBSAG Negative 2019    STREPBCULT No Group B Streptococcus isolated 2020     No results for input(s): HGB, HCT in the last 48 hours.    I have personallly reviewed all pertinent lab results from the last 24 hours.    Physical Exam:    Constitutional: She is oriented to person, place, and time. She appears well-developed and well-nourished.    HENT:   Head: Normocephalic.     Neck: Normal range of motion.    Cardiovascular: Normal rate.     Pulmonary/Chest: Effort normal.        Abdominal: Soft.   Non-Tender             Musculoskeletal: Normal range of motion and moves all extremeties.       Neurological: She is alert and oriented to person, place, and time.    Skin: Skin is warm and dry.    Psychiatric: She has a normal mood and affect. Her behavior is normal. Judgment and thought content normal.       Assessment/Plan:     30 y.o. female  for:    *  (spontaneous vaginal delivery)  NVD of viable female infant  Discharge instructions reviewed including PIH S&S, PPD, pain management and bleeding precautions    Delivery outcome of single liveborn infant  NVD of viable female infant  Care per peds  Lactation consult due to possible tongue/lip tie, pt breastfeeding and pumping        Disposition: As patient meets milestones, will plan to discharge today.    Felipe Elliott CNM  Obstetrics  [facility]

## 2020-07-05 NOTE — PLAN OF CARE
Vs stable doing well. Breast feeding well. Oob voiding well. Passing flatus . Breast feeding mother .having latch issues pumping and hand expressing getting little ebm.  Mother supplementing baby with formula with a nipple and bottle .  At  7a Baby attempted  breast latch , no transfer of milk noted , no swallow noted   with breast feeding and then mom choose to feeding  formula well with bottle .   Mother has a breast shield.  Proper demo and use of Breast pump and comfort gels for healing of sore nipples .  Discussed with Lactation  and  LC will do out pt follow up as  needed .      Mother stated her first child had a lip tie and baby never would latch but mother pumped for 10 months and gave formula and breast milk .

## 2020-07-05 NOTE — ASSESSMENT & PLAN NOTE
NVD of viable female infant  Discharge instructions reviewed including PIH S&S, PPD, pain management and bleeding precautions

## 2020-07-05 NOTE — DISCHARGE SUMMARY
[facility] -   Obstetrics  Discharge Summary      Patient Name: Tawanna Harp  MRN: [id number]  Admission Date: 2020  Hospital Length of Stay: 3 days  Discharge Date and Time:  2020 8:19 AM  Attending Physician: Stephani Allred MD   Discharging Provider: Felipe Elliott CNM   Primary Care Provider: Primary Doctor No    HPI: Presents for scheduled version and IOL if successful or  if not      * No surgery found *     Hospital Course:   Admit for delivery  7/3/20--AROM, clear fluid, 3/70/2, restart pitocin  20 PPD #1 routine PP care, breastfeeding  20 PPD2, Discharge instructions reviewed including PIH S&S, PPD, pain management and bleeding precautions, Will have f/u for tongue and lip revision for infant         Final Active Diagnoses:    Diagnosis Date Noted POA    PRINCIPAL PROBLEM:   (spontaneous vaginal delivery) [O80] 2020 Not Applicable    Delivery outcome of single liveborn infant [Z37.0] 2020 Not Applicable      Problems Resolved During this Admission:    Diagnosis Date Noted Date Resolved POA    Encounter for induction of labor [Z34.90] 2020 Not Applicable    Post-term pregnancy, 40-42 weeks of gestation [O48.0] 2020 Yes    Breech presentation successfully converted, antepartum, not applicable or unspecified fetus [O32.1XX0] 06/15/2020 2020 Yes    Breech presentation [O32.1XX0] 2020 Yes        Significant Diagnostic Studies: Labs: All labs within the past 24 hours have been reviewed      Feeding Method: Breast and pumping     Immunizations     None          Delivery:    Episiotomy: None   Lacerations: None   Repair suture:     Repair # of packets:     Blood loss (ml): 0     Birth information:  YOB: 2021   Time of birth: 5:12 PM   Sex: female   Delivery type: Vaginal, Spontaneous   Gestational Age: 40w2d    Delivery Clinician:      Other providers:       Additional   information:  Forceps:    Vacuum:    Breech:    Observed anomalies      Living?:           APGARS  One minute Five minutes Ten minutes   Skin color:         Heart rate:         Grimace:         Muscle tone:         Breathing:         Totals: 9  9        Placenta: Delivered:       appearance    Pending Diagnostic Studies:     None          Discharged Condition: stable    Disposition: Home or Self Care    Follow Up:  Follow-up Information     Tita Ruvalcaba CNM. Go in 4 weeks.    Specialty: Obstetrics and Gynecology  Why: Routine PP Care  Contact information:  [address] [facility] [address] [phone]                 Patient Instructions:   No discharge procedures on file.  Medications:  Current Discharge Medication List      START taking these medications    Details   ibuprofen (ADVIL,MOTRIN) 600 MG tablet Take 1 tablet (600 mg total) by mouth every 6 (six) hours.  Qty: 30 tablet         CONTINUE these medications which have NOT CHANGED    Details   PRENATAL VIT CALC,IRON,FOLIC (PRENATAL VITAMIN ORAL) Take by mouth once daily.      Lactobacillus rhamnosus GG (CULTURELLE) 10 billion cell capsule Take 1 capsule by mouth once daily.             Felipe Elliott CNM  Obstetrics  [facility]

## 2020-07-05 NOTE — LACTATION NOTE
Vs stable doing well. Breast feeding well. Oob voiding well. Passing flatus . Breast feeding mother .having latch issues pumping and hand expressing getting little ebm.  Mother supplementing baby with formula with a nipple and bottle .  At  7a Baby attempted  breast latch , no transfer of milk noted , no swallow noted   with breast feeding     and then mom choose to feeding  formula well with bottle .   Mother has a breast shield.  Proper demo and use of Breast pump and comfort gels for healing of sore nipples .      Discussed with Lactation  and  LC will do out pt follow up as  needed .      Mother stated her first child had a lip tie and baby never would latch but mother pumped for 10 months and gave formula and breast milk .      Mother requesting supplementation due to  Unable to latch baby , elevated jaundice level in baby ,    Reviewed risks of supplementation. Discussed adequacy of colostrum. Instructed mother on normal  feeding and sleeping patterns. Encouraged mother to breastfeed infant a minimum of 8 times in 24 hours prior to supplementation to promote appropriate breast stimulation for adequate milk supply. Discussed with mother preferred alternative feeding methods, such as supplement infant at breast via SNS, syringe, spoon, or cup feeding. Discussed risks and encouraged mother to avoid artificial nipples and bottles. Mother chooses to supplement infant via bottle and nipple with formula similac  .  Mother taught how to safely feed infant via this method. Demonstrated by nurse and mother return demonstrates proper and safe usage.   Because baby is being supplemented away from the breast, mother was:   - informed that breastfeeding support and assistance is available as needed  - encouraged to express milk from both breasts each time a supplement is given  - encouraged to use her own collected milk as a first choice for supplementation  Mother was encouraged to request assistance as needed and  voices understanding.

## 2020-07-05 NOTE — LACTATION NOTE
Visited mother in room. She is pumping, and complaining of nipple pain. On exam, nipples are red, flaky and inflamed.     Baby's weight loss and output is WDL. She was eating at the breast, and was supplemented with formula overnight for nipple pain.     Maternal Pumping   Type of pump: Medela symphony, has spectra at home  Flange size: she was using 24, we changed it to 27 during consult  Amount collected per session: no   Pain: yes    ORAL ASSESSMENT- INFANT    Lips: Frenum is attaching to the palate.   Upper maxillary labial frenum and upper gumline:     Tongue: poor cupping, taunt at the posterior part of the mouth  Lateralization: poor  Lingual frenum: tight, blanching when tongue is resting on palate    Suck assessment: poor seal, poor peristaltic       IMPRESSION  Infant with tethered oral tissue   Nipple trauma    RECOMMENDATIONS  - ensure proper nutrition with supplementation  - ensure proper milk production by pumping 8-10 times a day.     PLAN OF CARE      Speak with primary care provider about potential referrals to:   Speech therapy   Physical therapy   Occupational therapy   ENT or Dentist    Contact Lactation at [phone] with any questions or concerns, and to modify plan of care as needed.

## 2020-07-14 ENCOUNTER — DOCUMENTATION ONLY (OUTPATIENT)
Dept: LACTATION | Facility: CLINIC | Age: 30
End: 2020-07-14

## 2020-07-20 ENCOUNTER — DOCUMENTATION ONLY (OUTPATIENT)
Dept: LACTATION | Facility: HOSPITAL | Age: 30
End: 2020-07-20

## 2020-07-23 ENCOUNTER — TELEPHONE (OUTPATIENT)
Dept: LACTATION | Facility: CLINIC | Age: 30
End: 2020-07-23

## 2020-08-05 ENCOUNTER — LACTATION CONSULT (OUTPATIENT)
Dept: LACTATION | Facility: CLINIC | Age: 30
End: 2020-08-05

## 2020-08-05 DIAGNOSIS — Z71.9 HEALTH EDUCATION/COUNSELING: Primary | ICD-10-CM

## 2020-08-05 NOTE — Clinical Note
"I saw this mother in a lactation consult. She complains that her "energy levels are trashed" I am concerned about PPD or something else like thyroid. Her pcp was Id[facility], but she left. I counseled her to look for a new PCP. She will be seeing you on 8/12  Thanks!"

## 2020-08-05 NOTE — PROGRESS NOTES
"Copied from baby's chart      Start time: 1435  End time: 1545                 REASON FOR CONSULT   Lactation follow up        PERTINENT HISTORY     Linda is coming for follow up with speech therapy  She ss going at the breast 2-3 times per day, about 10 minutes. Nursing is still quite painful. This is then followed by bottle feeding. She eats 3 oz per bottle feeding. Her output is: 6 or more wet diapers per day, and 1 big stool every other day. She is gaining weight since the revision.      Mother pumps 8 time per day, using Spectra # 28  Her supply is still at the low end: she has to give usually one formula bottle per day        ORAL ASSESSMENT- INFANT     Upper maxillary labial frenum and upper gumline: healing adequately. Slight blanching is still noted     Tongue: milk noted on tongue  Lateralization: WDL  Lingual frenum: healing adequately     Suck assessment: suck assessed by SLP during assessment.      STRUCTURAL ASSESSMENT- INFANT     Body state: WDL. Mother states that baby tolerates well tummy time.   Side Preference: noted a preference to the right, but mother states that baby seems to prefer left in her crib.   Head tilt/rotation:                   FEEDING ASSESSMENT     BREASTFEEDING     Maternal Breast Assessment: soft     LEFT  Position: cross cradle  Latch: adequate  Behavior: sleepy  Concerns: yes, sleepy, flow dependent, non nutritive feeding  Minutes: 5 minutes  Amount transferred: 8 ml     TOTAL BREASTFEEDING  Total minutes: 5  Total transferred: 8 ml     Behavior after breastfeeding: sleepy     SUPPLEMENT  EBM/Formula: gentle ease  Method: belinda, Dr Huggins no 1  Behavior: needed cheek and chin support- with support noted great improvement  Time: less than 30 miuntes  Amount: 3 oz     Behavior after supplementation: content, sleepy     **During consultation, mother verbalized that her energy levels "were trashed". Discussed the needs to establish care with PCP for proper evaluation.** consulted " mother on S/S of PPD- she denies being depress but agrees to establish care with a PCP.         IMPRESSION  Improving starr motor function  Low milk supply  Low maternal energy, needing evaluation (Thyroid vs depression vs anemia?)     RECOMMENDATIONS     PLAN OF CARE     - Keep current POC with supplementation by bottle  - Perform cheek and chin support as demonstrated by SLP  - Establish care with PCP as soon as possible. Evaluate thyroid, anemia and other possible causes of low energy levels.         FOLLOW-UP        Contact Lactation at [phone] with any questions or concerns, and to modify plan of care as needed.

## 2020-08-07 ENCOUNTER — TELEPHONE (OUTPATIENT)
Dept: OBSTETRICS AND GYNECOLOGY | Facility: CLINIC | Age: 30
End: 2020-08-07

## 2020-08-07 NOTE — TELEPHONE ENCOUNTER
"----- Message from Sol Willis RN, IBCLC sent at 8/5/2020  1:39 PM CDT -----  I saw this mother in a lactation consult. She complains that her "energy levels are trashed" I am concerned about PPD or something else like thyroid. Her pcp was Idania, but she left. I counseled her to look for a new PCP. She will be seeing you on 8/12Thanks!  "

## 2020-08-12 ENCOUNTER — LACTATION CONSULT (OUTPATIENT)
Dept: LACTATION | Facility: CLINIC | Age: 30
End: 2020-08-12

## 2020-08-12 ENCOUNTER — TELEPHONE (OUTPATIENT)
Dept: OBSTETRICS AND GYNECOLOGY | Facility: CLINIC | Age: 30
End: 2020-08-12

## 2020-08-12 ENCOUNTER — POSTPARTUM VISIT (OUTPATIENT)
Dept: OBSTETRICS AND GYNECOLOGY | Facility: CLINIC | Age: 30
End: 2020-08-12

## 2020-08-12 VITALS
SYSTOLIC BLOOD PRESSURE: 106 MMHG | DIASTOLIC BLOOD PRESSURE: 68 MMHG | HEIGHT: 69 IN | WEIGHT: 160.25 LBS | BODY MASS INDEX: 23.74 KG/M2

## 2020-08-12 DIAGNOSIS — N81.10 FEMALE CYSTOCELE: ICD-10-CM

## 2020-08-12 DIAGNOSIS — Z71.9 HEALTH EDUCATION/COUNSELING: Primary | ICD-10-CM

## 2020-08-12 PROCEDURE — 87624 HPV HI-RISK TYP POOLED RSLT: CPT

## 2020-08-12 PROCEDURE — 99999 PR PBB SHADOW E&M-EST. PATIENT-LVL II: ICD-10-PCS | Mod: PBBFAC,,, | Performed by: MIDWIFE

## 2020-08-12 PROCEDURE — 88175 CYTOPATH C/V AUTO FLUID REDO: CPT | Performed by: PATHOLOGY

## 2020-08-12 PROCEDURE — 88141 CYTOPATH C/V INTERPRET: CPT | Mod: ,,, | Performed by: PATHOLOGY

## 2020-08-12 PROCEDURE — 99999 PR PBB SHADOW E&M-EST. PATIENT-LVL II: CPT | Mod: PBBFAC,,, | Performed by: MIDWIFE

## 2020-08-12 PROCEDURE — 0503F POSTPARTUM CARE VISIT: CPT | Mod: ,,, | Performed by: MIDWIFE

## 2020-08-12 PROCEDURE — 88141 PR  CYTOPATH CERV/VAG INTERPRET: ICD-10-PCS | Mod: ,,, | Performed by: PATHOLOGY

## 2020-08-12 PROCEDURE — 0503F PR POSTPARTUM CARE VISIT: ICD-10-PCS | Mod: ,,, | Performed by: MIDWIFE

## 2020-08-12 PROCEDURE — 99212 OFFICE O/P EST SF 10 MIN: CPT | Mod: PBBFAC | Performed by: MIDWIFE

## 2020-08-12 NOTE — PROGRESS NOTES
"Subjective:       Tawanna Harp is a 30 y.o. female who presents for a postpartum visit. She is 5 weeks postpartum following a spontaneous vaginal delivery. I have fully reviewed the prenatal and intrapartum course. The delivery was at 40.2 gestational weeks. Outcome: spontaneous vaginal delivery. Anesthesia: epidural. Postpartum course has been normal. Baby's course has been normal with the exception of a tongue tie revision. Baby is feeding by breast, supplementing with formula 1x daily. Bleeding no bleeding. Bowel function is normal. Reports some hard & painful stools. Encouraged increasing hydration and OTC stool softener. Bladder function is normal. Patient is not sexually active. Contraception method is abstinence. Postpartum depression screening: negative. Encouraged to establish care with primary care doctor. Milk supply is on the low end. States energy is low, but strongly believes is from lack of sleep. No hx of thyroid disorders. States she feels like "bladder has dropped."    The following portions of the patient's history were reviewed and updated as appropriate: allergies, current medications, past family history, past medical history, past social history, past surgical history and problem list.    Review of Systems  Pertinent items are noted in HPI.     Objective:      /68   Ht 5' 9" (1.753 m)   Wt 72.7 kg (160 lb 4.4 oz)   LMP 09/27/2019 (Exact Date)   BMI 23.67 kg/m²    General:  alert, appears stated age and cooperative    Breasts:  inspection negative, no nipple discharge or bleeding, no masses or nodularity palpable   Lungs: clear to auscultation bilaterally   Heart:  regular rate and rhythm, S1, S2 normal, no murmur, click, rub or gallop   Abdomen: soft, non-tender; bowel sounds normal; no masses,  no organomegaly    Vulva:  normal   Vagina: normal vagina, no discharge, exudate, lesion, or erythema CYSTOCELE PRESENT   Cervix:  multiparous appearance and no lesions   Corpus: " normal size, contour, position, consistency, mobility, non-tender   Adnexa:  no mass, fullness, tenderness   Rectal Exam: Not performed.          Assessment:       Normal postpartum exam. Pap smear with HPV co-testing done at today's visit.     Plan:      1. Contraception: condoms  2. PT pelvic referral for Cystocele  3. Follow up in: 1 year for Annual Exam or as needed.

## 2020-08-13 ENCOUNTER — TELEPHONE (OUTPATIENT)
Dept: OBSTETRICS AND GYNECOLOGY | Facility: CLINIC | Age: 30
End: 2020-08-13

## 2020-08-13 DIAGNOSIS — M62.89 PELVIC FLOOR DYSFUNCTION: ICD-10-CM

## 2020-08-13 DIAGNOSIS — N81.10 FEMALE CYSTOCELE: Primary | ICD-10-CM

## 2020-08-13 NOTE — TELEPHONE ENCOUNTER
Returned call to Pat Stone with [facility].  She request that referral be resubmitted to Ambulatory Referral to Physical/Occupational Therapy.  Dx: Pelvic Floor Therapy.  Pat Stone would like an IM when order is submitted, so that referral can be attached.

## 2020-08-13 NOTE — TELEPHONE ENCOUNTER
----- Message from Melvin Martinez sent at 8/13/2020 12:16 PM CDT -----  Contact: Pat  Ms. Stewart would like a referral for pt for the pt. So she can schedule an appt for pt.Please  call back at [phone]    Thanks,  Mary Ann

## 2020-08-14 NOTE — PROGRESS NOTES
Patient seen with infant for post op frenectomy visit.     Information pertinent to mother includes:     Low maternal milk supply  Adequate stimulation via pumping 8x per day    Continue to monitor milk supply as stimulation is now adequate. If no improvement noted consider further evaluation for cause of hypogalactia.

## 2020-08-14 NOTE — PROGRESS NOTES
Copied from infant's chart for feeding team evaluation:    Pertinent maternal information noted during consult:     Low maternal milk supply  Inadequate breast stimulation  Visible assessment of maternal thyroid, possibly enlarged  Hx of multiple SAB, thyroid labs per Dr. Emerson, WNL     Recommendations and plan of care specific to mother include pumping plan. Monitor response of milk supply with adequate stimulation.

## 2020-08-14 NOTE — PROGRESS NOTES
Copied from baby's chart      Start time: 1230  End time: 1345     REASON FOR CONSULT   Follow up co-consult with speech        PERTINENT HISTORY     Mother  Age: 30  G 4    P  2   Medical History: SAB, thyroid panel in past WNL.    Previous breastfeeding experience: latch difficulty, exclusively pumped 10months   Complications of pregnancy:  marginal cord insertion, breech presentation, successful version, back to breech, second version successful and IOL   Complications of delivery: denies      Delivery/ Hospital  Gestational Age: 40.2  Delivery Date: [birth year]  Type of birth:   Place of Delivery: [facility]  Pediatrician:  Gera Cuellar in hospital: medical indication for formula supplementation due to hypoglycemia. Latch painful, abrasions to nipples      Infant   Birth weight: 8lb 5.7oz / 3.79kg     Feeds per day: ~8  Frequency: Q2.5-3H during the day. 4 hour stretches at night    Method of feeding: bottle with direct breastfeeding 3x/day  Length of feeds: 3oz bottles less than 20min. At breast average is 15-30 min          Voids per day: 6+  Stools per day:1 large every other day  Color/ consistency: yellow liquidy     Maternal Pumping   Type of pump: spectra  Flange size: 28mm  X per day: 6-8   Time per session: 20-30  Amount collected per session: morning sessions 3.5oz then declines during the day to less than 1oz per side. Average daily pumped volume is 16-18oz   Pain: no     ORAL ASSESSMENT- INFANT  Lips: intact  Upper maxillary labial frenum and upper gumline: notch to gumline. Flanges well with exam mild blanching. Revision site healed.      Tongue: square tip. Milk coating noted to posterior portion of tongue. Open mouth posture sleeping noted. Tongue to palate spontaneously   Lateralization: present bilaterally   Lingual frenum: did not visualize during consult.     Suck assessment: moderate suck force, smooth wavelike motion, maintained midline groove on gloved finger with appropriate labial  seal.       STRUCTURAL ASSESSMENT- INFANT   Body state: tone appropriate  Side Preference: right breast preference due to flow, no structural difference noted  Head tilt/rotation: none noted                        FEEDING ASSESSMENT     BREASTFEEDING     Infant pre-feeding weight in clothes: 10lb 14.0oz / 4932g     Maternal Breast Assessment: soft, no engorgement. No s/s of mastitis. No anatomical abnormality noted. Nipple everted and intact     RIGHT  Position: cross cradle  Latch: moderately wide corner angle, alternates between nutritive and non nutritive jaw motion. Rounded cheek line   Behavior: initially fussy, arching away from breast, chomp/gum then squirm, breast compressions helped settle infant. Flow dependent   Concerns: inconsistent jaw motion, non nutritive suck  Minutes: 13  Weight after right: 10lb 4.6oz / 4950g  Amount transferred: 0.6oz / 18g     LEFT  Position: cross cradle   Latch: latched with less frustration, moderate corner angle, rounded cheek line, mostly non nutritive jaw motion   Behavior: sleepy, more passive than previous side.   Concerns: possible fatigue     Minutes: 12  Weight after left: 10lb 15oz / 4962g  Amount transferred: 0.4oz / 12g         Behavior after breastfeeding: removed from breast as infant was sleeping. Feeding cues returned, hands to mouth, rooting, active awake.      SUPPLEMENT  EBM/Formula: formula   Method: bottle Dr. Huggins  Behavior: minimal spill from corner of mouth. Cheek support provided per mother. Well controlled. Frequent pauses.  Time: 12min  Amount:  3oz      Behavior after supplementation: content, satiated, soft body posture     IMPRESSION  Ineffective transfer  Impaired oral motor function   Low maternal supply (total volume 16-18oz in 24 hours)  Adequate breast stimulation  Adequate weight gain      RECOMMENDATIONS  Attempt supplementation at breast next visit with SNS   Encouraged mother to seek out provider regarding concern for thyroid dysfunction        PLAN OF CARE     · Begin to taper oral stretches by dropping one set of stretches per day over the next week.   · Drop 3am stretch, feed, and pump session  · Continue current pumping schedule other than 3am   · Increased skin to skin  · legendairy liquid gold supplement   · Record daily intake and output as directed.        FOLLOW-UP     · Next Friday 8/21 at 1:30pm lactation follow up and attempt to supplement at breast         Contact Lactation at [phone] with any questions or concerns, and to modify plan of care as needed.

## 2020-08-14 NOTE — TELEPHONE ENCOUNTER
7/23/2020 Contacted ERYN Ruvalcaba CNM to notify of insufficient milk supply despite adequate stimulation and maximum lactation support. Reported visibly enlarged maternal thyroid noted during previous encounters for infant feeding difficulties. No new orders at this time.

## 2020-08-21 ENCOUNTER — LACTATION CONSULT (OUTPATIENT)
Dept: LACTATION | Facility: CLINIC | Age: 30
End: 2020-08-21

## 2020-08-21 DIAGNOSIS — Z71.9 HEALTH EDUCATION/COUNSELING: Primary | ICD-10-CM

## 2020-08-21 NOTE — PROGRESS NOTES
Copied from infant's chart    Start time: 1:33  End time: 3: 30     REASON FOR CONSULT   Follow up, attempt SNS         PERTINENT HISTORY     Mother  Age: 31  G 4    P  2   Medical History: SAB, thyroid panel in past WNL.    Previous breastfeeding experience: latch difficulty, exclusively pumped 10months   Complications of pregnancy:  marginal cord insertion, breech presentation, successful version, back to breech, second version successful and IOL   Complications of delivery: denies      Delivery/ Hospital  Gestational Age: 40.2  Delivery Date: [birth year]  Type of birth:   Place of Delivery: [facility]  Pediatrician:  Gera Cuellar in hospital: medical indication for formula supplementation due to hypoglycemia. Latch painful, abrasions to nipples      Infant   Birth weight: 8lb 5.7oz / 3.79kg     Feeds per day: ~7  Frequency: Q2.5-3H during the day. 7 hour stretches at night    Method of feeding: bottle with direct breastfeeding 4+ times a day (follows with bottle)  Length of feeds: 3oz bottles less than 20min. At breast longest will stay at breast is 20min total usually less than 10min per side         Voids per day: 6+  Stools per day:1 large daily   Color/ consistency: yellow liquidy     Maternal Pumping   Type of pump: spectra  Flange size: 28mm  X per day: 6   Time per session: 20-30  Amount collected per session: morning sessions 1oz on side baby fed from, 3-4oz on opposite breast.  then declines during the day to less than 1oz per side.    Pain: no     ORAL ASSESSMENT- INFANT  Lips: intact  Upper maxillary labial frenum and upper gumline: notch to gumline. Flanges well with exam mild blanching. Revision site healed.      Tongue: square tip. Milk coating noted to posterior portion of tongue. Open mouth posture sleeping noted. Tongue to palate spontaneously   Lateralization: present bilaterally   Lingual frenum: did not visualize during consult.     Suck assessment: moderate-strong suck force, smooth  wavelike motion, maintained midline groove on gloved finger with appropriate labial seal.       STRUCTURAL ASSESSMENT- INFANT   Body state: tone appropriate  Side Preference: right breast preference due to flow, no structural difference noted  Head tilt/rotation: none noted                        FEEDING ASSESSMENT     BREASTFEEDING     Infant pre-feeding weight in clothes: 11lb 7.7oz / 5208g     Maternal Breast Assessment: soft, no engorgement. No s/s of mastitis. No anatomical abnormality noted. Nipple everted and intact    LEFT  Position: cross cradle   Latch: latches well, asymmetrical latch, wide angle and rounded cheek line. Mostly nutritive suck with audible swallows.    Behavior: quickly became frustrated at breast, arching away, latch and unlatch multiple times    Concerns: flow dependent, seems behavioral more than functional     Minutes: 7  Weight after left: 11lb 8.3oz / 5224g  Amount transferred: 0.6oz / 16mL      RIGHT  Position: cross cradle  Latch: wide corner angle, alternates between nutritive and non nutritive jaw motion. Rounded cheek line   Behavior: frustrated, latch and clamp while arching away. Flow dependent, typically follows with a bottle when this is observed. Provided SNS, infant responded well to flow and remained at breast for full feeding. Few pop offs and positioning adjustments made, overall content with feeding, nursed to sleep.    Concerns: flow dependent- becomes frustrated quickly and expects bottle to follow   Minutes: 40  Weight after right: 11lb 12.6oz / 5346g  Amount transferred: 4.3oz / 122mL      Behavior after breastfeeding: sleeping, relaxed posture, satiated.          IMPRESSION  Flow dependent  Effective use of supplemental nursing system  Improving oral motor function   Low maternal supply- (will begin liquid gold supplement in addition to supplementing at breast)  Adequate breast stimulation  Adequate weight gain      RECOMMENDATIONS  No further referrals at this  "time  Continue pumping plan and begin supplementing at breast.   Goal of SNS is to have infant start to associate full feeding at breast as opposed to "snack" followed by bottle.   Supplementation directly at breast and galactagogue in effort to increase supply      PLAN OF CARE     · Continue normal breast feeding session for morning feed  · Attempt SNS 1-2 times daily. The supplementation via SNS will take place of bottle typically provided after nursing.  · Continue to pump after feeding.   · continue skin to skin  · legendairy liquid gold supplement   · Record daily intake and output as directed.  · Supervised tummy time 4 times a day as tolerated.         FOLLOW-UP     ·  next Friday 1:30 follow up        Contact Lactation at [phone] with any questions or concerns, and to modify plan of care as needed.    "

## 2020-08-24 LAB
HPV HR 12 DNA SPEC QL NAA+PROBE: NEGATIVE
HPV16 AG SPEC QL: NEGATIVE
HPV18 DNA SPEC QL NAA+PROBE: NEGATIVE

## 2020-09-08 ENCOUNTER — TELEPHONE (OUTPATIENT)
Dept: OBSTETRICS AND GYNECOLOGY | Facility: CLINIC | Age: 30
End: 2020-09-08

## 2020-09-09 ENCOUNTER — TELEPHONE (OUTPATIENT)
Dept: OBSTETRICS AND GYNECOLOGY | Facility: CLINIC | Age: 30
End: 2020-09-09

## 2020-09-09 NOTE — TELEPHONE ENCOUNTER
Attempted to contact pt to reschedule a visit for repeat pap because not enough cells collected. Unable to leave voicemail due to not set up.

## 2020-09-10 NOTE — TELEPHONE ENCOUNTER
Tita Ruvalcaba, JEAN Rivers Staff 2 days ago     Please call Ms. Harp to reschedule PAP. Her smear did not contain enough cells. I attempted to message her, but it doesn't look like she is reading her portal messages. Thank you!!!!!    Routing comment

## 2020-10-08 ENCOUNTER — TELEPHONE (OUTPATIENT)
Dept: OBSTETRICS AND GYNECOLOGY | Facility: CLINIC | Age: 30
End: 2020-10-08

## 2020-10-09 ENCOUNTER — TELEPHONE (OUTPATIENT)
Dept: OBSTETRICS AND GYNECOLOGY | Facility: CLINIC | Age: 30
End: 2020-10-09

## 2020-10-09 NOTE — TELEPHONE ENCOUNTER
Spoke to pt regarding a repeat pap is needed due to not enough cells collected. Scheduled appt for repeat pap for next Monday with Kelly @ 0922. Pt. verbalized understanding.

## 2020-10-09 NOTE — TELEPHONE ENCOUNTER
Attempted to contact pt regarding rescheduling her pap due to not enough cells. No answer, unable to leave voicemail. Will attempt to contact her later.

## 2020-10-12 ENCOUNTER — OFFICE VISIT (OUTPATIENT)
Dept: OBSTETRICS AND GYNECOLOGY | Facility: CLINIC | Age: 30
End: 2020-10-12

## 2020-10-12 VITALS
DIASTOLIC BLOOD PRESSURE: 58 MMHG | BODY MASS INDEX: 23.54 KG/M2 | WEIGHT: 158.94 LBS | SYSTOLIC BLOOD PRESSURE: 102 MMHG | HEIGHT: 69 IN

## 2020-10-12 DIAGNOSIS — R87.615 UNSATISFACTORY CERVICAL PAPANICOLAOU SMEAR: Primary | ICD-10-CM

## 2020-10-12 PROCEDURE — 99214 OFFICE O/P EST MOD 30 MIN: CPT | Mod: S$PBB,,, | Performed by: NURSE PRACTITIONER

## 2020-10-12 PROCEDURE — 99999 PR PBB SHADOW E&M-EST. PATIENT-LVL III: ICD-10-PCS | Mod: PBBFAC,,, | Performed by: NURSE PRACTITIONER

## 2020-10-12 PROCEDURE — 99214 PR OFFICE/OUTPT VISIT, EST, LEVL IV, 30-39 MIN: ICD-10-PCS | Mod: S$PBB,,, | Performed by: NURSE PRACTITIONER

## 2020-10-12 PROCEDURE — 99999 PR PBB SHADOW E&M-EST. PATIENT-LVL III: CPT | Mod: PBBFAC,,, | Performed by: NURSE PRACTITIONER

## 2020-10-12 PROCEDURE — 88175 CYTOPATH C/V AUTO FLUID REDO: CPT

## 2020-10-12 PROCEDURE — 99213 OFFICE O/P EST LOW 20 MIN: CPT | Mod: PBBFAC | Performed by: NURSE PRACTITIONER

## 2020-10-12 NOTE — PATIENT INSTRUCTIONS
Why Have a Pap Test?  Early on, cervical changes don't cause symptoms. Often, the only way to know you have cervical changes is to do a Pap test. A Pap test can find these problems early, when they are easier to treat. Pap tests can also detect some infections of the cervix and vagina.  What is a Pap test?  A Pap test is a procedure that helps find changes in the cervix that may lead to cancer. The cervix is the part of the uterus that opens into the vagina. For this test, a small sample of cells is taken from the cervix. This is done in your health care providers office. The cells are then analyzed in a lab. A Pap test is a safe procedure. It takes just a few minutes and causes little or no discomfort.  The HPV connection  Human papillomavirus or HPV is a family of viruses that spread through skin contact. Certain types are almost always spread through sexual contact. Some HPV types cause genital warts (condyloma). But not all types of HPV cause visible symptoms. Certain types cause cell changes (dysplasia) in the cervix that can lead to cancer. In fact, HPV infection is the most important risk factor for cervical cancer. Doctors can now test for HPV. Testing for HPV is often done with the Pap test. Thats why its important to have Pap tests as recommended by your health care provider. This helps ensure that any abnormal cells will be found and treated before they become cancerous.  Who should have a Pap test and HPV test?  Ask your health care provider when to start having Pap tests, whether you should have an HPV test done at the same time, and how often to have them. Follow these guidelines from the American Cancer Society for cervical cancer screening:  · A first Pap test at age 21. And then every 3 years until age 29, HPV testing is not recommended during this time, though it may be done to follow-up on an abnormal Pap test.  · Starting at age 30, the preferred testing is a Pap test done with an HPV test  every 5 years. This should be done until age 65. Another option for women in this 30-65 age group is to have just the Pap test done every 3 years.  · You may need a different screening schedule if you are at high risk for cervical cancer. Risk factors include having HIV, a weak immune system, or exposure to the medication PAULINA while your mother was pregnant with you. Talk with your doctor about the best schedule for you.  · If youre over 65 and have had regular screenings for the last 10 years with no abnormal results in the last 20 years, you may stop cervical cancer screening.  · If you had a hysterectomy that included removing your cervix, you can stop screening unless the hysterectomy was done to treat cervical cancer or pre-cancer. If you still have your cervix after the hysterectomy, you should continue screening according to the above guidelines.  · No woman of any age needs to be tested each year.  · Women who have been vaccinated for HPV should still follow these guidelines.  · If you have had cervical cancer, talk to your doctor about the screening plan that's best for you.  Date Last Reviewed: 5/18/2015  © 4064-8109 [employer]. [address] [place] [address]. All rights reserved. This information is not intended as a substitute for professional medical care. Always follow your healthcare professional's instructions.        Pap Test     A speculum is used to open the vagina so cells can be taken from the cervix.     Schedule your test for a time when you will not be having your menstrual period. If youre menstruating at the time of your appointment, call your healthcare provider to ask if you should reschedule.  For 48 hours before the test  · Do not douche.  · Do not use vaginal medicines, creams, or spermicides.  For 24 hours before the test  · Do not have sexual intercourse.  How the test is done  1. You lie on an exam table with your feet in stirrups (foot rests). This is the  usual position for a pelvic exam (an exam of the reproductive organs).  2. Your healthcare provider uses a speculum (a metal or plastic instrument) to gently open the vagina.  3. Cells are taken from the cervix with a small spatula or rubber broom. A small brush may then be used to remove cells from inside the cervical canal. You may feel pressure or slight discomfort.  Preserving the sample  There are 2 ways to preserve the sample after it is taken:  · Traditional preservation. With this method, the sample is smeared directly onto a glass microscope slide. The sample is then sent to a lab to be analyzed.  · Liquid-based preservation. The sample is placed in a special preservative solution. At the lab, cervical cells are  from blood and mucous cells and spread onto a slide. Screening for human papillomavirus (HPV) can also be done using the same sample.  After the test  Youre free to go! There is a slight chance of light bleeding or spotting. Your healthcare provider will tell you when to expect your test results.  Getting your results  Ask your healthcare provider how you will receive your results. You should always obtain and understand results of any testing you have done. These may be obtained by phone, mail, or through online access if available:  · Normal result. The cells in the sample appear healthy. Have your next Pap test as recommended by your healthcare provider.  · Abnormal result. The lab saw something unusual in your sample. Talk with your healthcare provider about what the results mean. You may need to repeat the Pap test or have other tests to evaluate the problem.   Date Last Reviewed: 12/1/2016  © 0924-5267 [employer]. [address] [place] [address], [place] [address]. All rights reserved. This information is not intended as a substitute for professional medical care. Always follow your healthcare professional's instructions.

## 2020-11-06 ENCOUNTER — PATIENT MESSAGE (OUTPATIENT)
Dept: OBSTETRICS AND GYNECOLOGY | Facility: CLINIC | Age: 30
End: 2020-11-06

## 2020-11-06 LAB
FINAL PATHOLOGIC DIAGNOSIS: NORMAL
Lab: NORMAL

## 2021-01-19 NOTE — PROGRESS NOTES
"Doing well   PTL precautions and fetal movement reviewed, when to go to hospital   Stressed hydration   Discussed upcoming GBS testing   "Quiet Hours"  " n/a

## 2021-12-02 ENCOUNTER — TELEPHONE (OUTPATIENT)
Dept: OBSTETRICS AND GYNECOLOGY | Facility: CLINIC | Age: 31
End: 2021-12-02
Payer: COMMERCIAL

## 2021-12-14 ENCOUNTER — TELEPHONE (OUTPATIENT)
Dept: OBSTETRICS AND GYNECOLOGY | Facility: CLINIC | Age: 31
End: 2021-12-14
Payer: COMMERCIAL

## 2022-01-18 ENCOUNTER — PATIENT MESSAGE (OUTPATIENT)
Dept: OBSTETRICS AND GYNECOLOGY | Facility: CLINIC | Age: 32
End: 2022-01-18

## 2022-01-18 ENCOUNTER — OFFICE VISIT (OUTPATIENT)
Dept: OBSTETRICS AND GYNECOLOGY | Facility: CLINIC | Age: 32
End: 2022-01-18
Payer: COMMERCIAL

## 2022-01-18 ENCOUNTER — LAB VISIT (OUTPATIENT)
Dept: LAB | Facility: HOSPITAL | Age: 32
End: 2022-01-18
Attending: NURSE PRACTITIONER

## 2022-01-18 VITALS
WEIGHT: 152.31 LBS | SYSTOLIC BLOOD PRESSURE: 96 MMHG | HEART RATE: 72 BPM | BODY MASS INDEX: 22.56 KG/M2 | HEIGHT: 69 IN | DIASTOLIC BLOOD PRESSURE: 64 MMHG

## 2022-01-18 DIAGNOSIS — Z32.01 POSITIVE PREGNANCY TEST: Primary | ICD-10-CM

## 2022-01-18 DIAGNOSIS — Z32.01 POSITIVE PREGNANCY TEST: ICD-10-CM

## 2022-01-18 LAB
ALBUMIN SERPL BCP-MCNC: 4.1 G/DL (ref 3.5–5.2)
ALP SERPL-CCNC: 53 U/L (ref 55–135)
ALT SERPL W/O P-5'-P-CCNC: 17 U/L (ref 10–44)
ANION GAP SERPL CALC-SCNC: 8 MMOL/L (ref 8–16)
AST SERPL-CCNC: 14 U/L (ref 10–40)
BILIRUB SERPL-MCNC: 1.2 MG/DL (ref 0.1–1)
BUN SERPL-MCNC: 5 MG/DL (ref 6–20)
CALCIUM SERPL-MCNC: 9.2 MG/DL (ref 8.7–10.5)
CHLORIDE SERPL-SCNC: 106 MMOL/L (ref 95–110)
CO2 SERPL-SCNC: 24 MMOL/L (ref 23–29)
CREAT SERPL-MCNC: 0.6 MG/DL (ref 0.5–1.4)
ERYTHROCYTE [DISTWIDTH] IN BLOOD BY AUTOMATED COUNT: 12.2 % (ref 11.5–14.5)
EST. GFR  (AFRICAN AMERICAN): >60 ML/MIN/1.73 M^2
EST. GFR  (NON AFRICAN AMERICAN): >60 ML/MIN/1.73 M^2
ESTIMATED AVG GLUCOSE: 97 MG/DL (ref 68–131)
GLUCOSE SERPL-MCNC: 87 MG/DL (ref 70–110)
HBA1C MFR BLD: 5 % (ref 4–5.6)
HCG INTACT+B SERPL-ACNC: NORMAL MIU/ML
HCT VFR BLD AUTO: 37 % (ref 37–48.5)
HGB BLD-MCNC: 12.3 G/DL (ref 12–16)
MCH RBC QN AUTO: 29.5 PG (ref 27–31)
MCHC RBC AUTO-ENTMCNC: 33.2 G/DL (ref 32–36)
MCV RBC AUTO: 89 FL (ref 82–98)
PLATELET # BLD AUTO: 256 K/UL (ref 150–450)
PMV BLD AUTO: 11.4 FL (ref 9.2–12.9)
POTASSIUM SERPL-SCNC: 3.8 MMOL/L (ref 3.5–5.1)
PROT SERPL-MCNC: 7.7 G/DL (ref 6–8.4)
RBC # BLD AUTO: 4.17 M/UL (ref 4–5.4)
SODIUM SERPL-SCNC: 138 MMOL/L (ref 136–145)
WBC # BLD AUTO: 7.55 K/UL (ref 3.9–12.7)

## 2022-01-18 PROCEDURE — 99213 PR OFFICE/OUTPT VISIT, EST, LEVL III, 20-29 MIN: ICD-10-PCS | Mod: S$GLB,,, | Performed by: NURSE PRACTITIONER

## 2022-01-18 PROCEDURE — 99213 OFFICE O/P EST LOW 20 MIN: CPT | Mod: PBBFAC | Performed by: NURSE PRACTITIONER

## 2022-01-18 PROCEDURE — 99999 PR PBB SHADOW E&M-EST. PATIENT-LVL III: ICD-10-PCS | Mod: PBBFAC,,, | Performed by: NURSE PRACTITIONER

## 2022-01-18 PROCEDURE — 99999 PR PBB SHADOW E&M-EST. PATIENT-LVL III: CPT | Mod: PBBFAC,,, | Performed by: NURSE PRACTITIONER

## 2022-01-18 PROCEDURE — 86901 BLOOD TYPING SEROLOGIC RH(D): CPT | Performed by: NURSE PRACTITIONER

## 2022-01-18 PROCEDURE — 99213 OFFICE O/P EST LOW 20 MIN: CPT | Mod: S$GLB,,, | Performed by: NURSE PRACTITIONER

## 2022-01-18 PROCEDURE — 83036 HEMOGLOBIN GLYCOSYLATED A1C: CPT | Performed by: NURSE PRACTITIONER

## 2022-01-18 PROCEDURE — 85027 COMPLETE CBC AUTOMATED: CPT | Performed by: NURSE PRACTITIONER

## 2022-01-18 PROCEDURE — 86592 SYPHILIS TEST NON-TREP QUAL: CPT | Performed by: NURSE PRACTITIONER

## 2022-01-18 PROCEDURE — 36415 COLL VENOUS BLD VENIPUNCTURE: CPT | Performed by: NURSE PRACTITIONER

## 2022-01-18 PROCEDURE — 80053 COMPREHEN METABOLIC PANEL: CPT | Performed by: NURSE PRACTITIONER

## 2022-01-18 PROCEDURE — 86762 RUBELLA ANTIBODY: CPT | Performed by: NURSE PRACTITIONER

## 2022-01-18 PROCEDURE — 86900 BLOOD TYPING SEROLOGIC ABO: CPT | Performed by: NURSE PRACTITIONER

## 2022-01-18 PROCEDURE — 87389 HIV-1 AG W/HIV-1&-2 AB AG IA: CPT | Performed by: NURSE PRACTITIONER

## 2022-01-18 PROCEDURE — 80074 ACUTE HEPATITIS PANEL: CPT | Performed by: NURSE PRACTITIONER

## 2022-01-18 PROCEDURE — 84702 CHORIONIC GONADOTROPIN TEST: CPT | Performed by: NURSE PRACTITIONER

## 2022-01-18 NOTE — PATIENT INSTRUCTIONS
"Patient Education       Pregnancy Symptoms   The Basics   Written by the doctors and editors at [facility]   How much do pregnancy symptoms vary? -- Symptoms during pregnancy are different for everybody. Also, it's possible to have different symptoms from one pregnancy to the next. Even so, there are certain symptoms that are common during pregnancy.  This article discusses symptoms that can happen during a normal, healthy pregnancy.  What symptoms are common early in pregnancy? -- Early in pregnancy, sometimes even before someone knows they are pregnant, common symptoms can include:  · Nausea, with or without vomiting - This is called "morning sickness," but it can happen at any time of the day. For most people, morning sickness lasts only the first few months of pregnancy.  · The breasts getting bigger and feeling painful  · Needing to urinate more often than usual  · Feeling more tired than usual  · Mild cramping in the lower belly  What symptoms can happen later in pregnancy? -- Many different symptoms can happen later in pregnancy. These can include:  · Heartburn or indigestion - This can feel like a burning feeling in your chest or throat, or pain in your stomach or chest.  · Constipation, which means trouble having bowel movements  · Hemorrhoids, which are swollen veins in the rectum that can be painful or itchy - They might bleed a little when you have a bowel movement.  · Stuffy nose and nosebleeds  · Feeling short of breath - This can get worse slowly as your pregnancy progresses.  · Low back pain  · Leg cramps  · Trouble sleeping  · Headaches  · Bleeding from your gums  · Needing to urinate more often than usual or during the night  · Feeling tired  · Your hair getting thicker  · Mild swelling in your feet or ankles  · Numbness or tingling in your hand, foot, or leg  · Varicose veins, which are swollen and twisted veins  · "False" contractions - Contractions are when the uterus tightens. This can cause pain " "and make the belly feel hard. "False" contractions, also called "Jericho Degroot" contractions, do not mean you are in labor. These are different than "true" contractions, which do mean you are in labor.  · Skin changes - Lots of different skin changes can happen during pregnancy. Some of these include:  ? Skin color changes - Your palms might turn pink or red. Also, the skin on parts of your body can turn darker. This includes areas on your face or around your nipples.  ? A dark line in the middle of your stomach from your belly button to your pubic area  ? Stretch marks - These look like red lines and are most common on the belly, breast, and thighs.  ? Spider veins - These look like tiny red spiderwebs on the skin. They are most common on the neck, face, upper chest, arms, and hands.  ? Skin tags, which are small growths of normal skin  When should I call my doctor or midwife? -- You should call your doctor or midwife if you:  · Have bleeding from your vagina  · Have fluid leaking from your vagina  · Don't feel your baby move around as much as it used to  · Have back or belly pain that doesn't get better with things you can try yourself, such as resting or changing your position  · Feel faint or dizzy  · Have heartburn that doesn't get better when you take an antacid medicine  · Have a headache that doesn't get better after you rest for an hour in a dark, quiet room  · Have pain when you urinate or blood in your urine  · Have a fever  · Have contractions that get stronger and more frequent   · Have pain when you breathe  · See dark spots or flashes of light, or have blurry vision  · Have concerns about a change in your health (for example, vomiting or a new rash)  All topics are updated as new evidence becomes available and our peer review process is complete.  This topic retrieved from [employer] on: Sep 21, 2021.  Topic 14097 Version 5.0  Release: 29.4.2 - C29.263  © 2021 UpToDate, Inc. and/or its affiliates. All " rights reserved.  picture 1: Skin changes on the face from pregnancy     This photo shows an example of skin color changes that can happen with pregnancy.  Graphic 31903 Version 3.0    Consumer Information Use and Disclaimer   This information is not specific medical advice and does not replace information you receive from your health care provider. This is only a brief summary of general information. It does NOT include all information about conditions, illnesses, injuries, tests, procedures, treatments, therapies, discharge instructions or life-style choices that may apply to you. You must talk with your health care provider for complete information about your health and treatment options. This information should not be used to decide whether or not to accept your health care provider's advice, instructions or recommendations. Only your health care provider has the knowledge and training to provide advice that is right for you. The use of this information is governed by the [employer] End User License Agreement, available at [email or web address]/[email or web address].The use of [employer] content is governed by the [employer] Terms of Use. ©2021 UpToDate, Inc. All rights reserved.  Copyright   © 2021 UpToDate, Inc. and/or its affiliates. All rights reserved.

## 2022-01-18 NOTE — PROGRESS NOTES
Subjective:     Tawanna Harp is being seen today for her prenatal risk assessment visit.  This is a planned pregnancy. She is at LMP  11w0d  DAHLIA of 2022. Her obstetrical history is significant for uterine septum surgery by Dr. Emerson which has helped pregnancy retention and has had no issues since. . Relationship with FOB: spouse, living together. Patient does intend to breast feed. Pregnancy history fully reviewed.    Pt will do labs today but hold off on rtc appt as insurance  Starts beginning of February pt may be going to [place] with  for work for 6 weeks.  May rtc after that for NOB and u/s for dating but still unsure if going.  Will seek care in [place] if does go and has any issues.     Feeling more weak and fatigued with this pregnancy. Nausea starting to pass. Pt concerned over twins.   Encouraged 64 oz of water daily and can do a rehydration product to see if helps to feel more hydrated.     upt in office today is positive    Menstrual History:  OB History    Para Term  AB Living   5 2 2   2 1   SAB IAB Ectopic Multiple Live Births   2     0 1      # Outcome Date GA Lbr Jarad/2nd Weight Sex Delivery Anes PTL Lv   5 Current            4 Term 20 40w2d   F INDUCTION         Birth Comments: breech inversion done x2, induction with vaginal delivery    3 Term 18 41w4d  3.81 kg (8 lb 6.4 oz) F Vag-Spont None N SUNIL   2 SAB            1 SAB                    Patient's last menstrual period was 2021 (exact date).      The following portions of the patient's history were reviewed and updated as appropriate: allergies, current medications, past family history, past medical history, past social history, past surgical history and problem list.    Review of Systems  Pertinent items are noted in HPI.      Objective:      deferred - pap up to date       Assessment:      Pregnancy at 11 and 0/7 weeks      Plan:      Initial labs drawn.  Prenatal vitamins.  Problem list  reviewed and updated.  Role of ultrasound in pregnancy discussed; fetal survey: ordered.  Follow up in when patient chooses  weeks.  75% of 20 min visit spent on counseling and coordination of care.

## 2022-01-19 LAB
ABO + RH BLD: NORMAL
BLD GP AB SCN CELLS X3 SERPL QL: NORMAL
HAV IGM SERPL QL IA: NEGATIVE
HBV CORE IGM SERPL QL IA: NEGATIVE
HBV SURFACE AG SERPL QL IA: NEGATIVE
HCV AB SERPL QL IA: NEGATIVE
HIV 1+2 AB+HIV1 P24 AG SERPL QL IA: NEGATIVE
RUBV IGG SER-ACNC: 10.9 IU/ML
RUBV IGG SER-IMP: REACTIVE

## 2022-01-25 LAB — RPR SER QL: NORMAL

## 2022-04-12 ENCOUNTER — PATIENT MESSAGE (OUTPATIENT)
Dept: OBSTETRICS AND GYNECOLOGY | Facility: CLINIC | Age: 32
End: 2022-04-12

## 2022-04-12 ENCOUNTER — TELEPHONE (OUTPATIENT)
Dept: OBSTETRICS AND GYNECOLOGY | Facility: CLINIC | Age: 32
End: 2022-04-12

## 2022-04-13 DIAGNOSIS — Z34.90 PREGNANCY, UNSPECIFIED GESTATIONAL AGE: Primary | ICD-10-CM

## 2022-05-13 DIAGNOSIS — Z34.92 NORMAL PREGNANCY IN SECOND TRIMESTER: Primary | ICD-10-CM

## 2022-05-19 ENCOUNTER — LAB VISIT (OUTPATIENT)
Dept: LAB | Facility: HOSPITAL | Age: 32
End: 2022-05-19
Attending: MIDWIFE
Payer: COMMERCIAL

## 2022-05-19 ENCOUNTER — INITIAL PRENATAL (OUTPATIENT)
Dept: OBSTETRICS AND GYNECOLOGY | Facility: CLINIC | Age: 32
End: 2022-05-19
Payer: COMMERCIAL

## 2022-05-19 VITALS
BODY MASS INDEX: 25.49 KG/M2 | SYSTOLIC BLOOD PRESSURE: 116 MMHG | DIASTOLIC BLOOD PRESSURE: 62 MMHG | WEIGHT: 172.63 LBS

## 2022-05-19 DIAGNOSIS — Z34.90 PREGNANCY, UNSPECIFIED GESTATIONAL AGE: ICD-10-CM

## 2022-05-19 DIAGNOSIS — Z28.21 REFUSES TETANUS, DIPHTHERIA, AND ACELLULAR PERTUSSIS (TDAP) VACCINATION: ICD-10-CM

## 2022-05-19 DIAGNOSIS — Z34.93 NORMAL PREGNANCY IN THIRD TRIMESTER: ICD-10-CM

## 2022-05-19 LAB — GLUCOSE SERPL-MCNC: 93 MG/DL (ref 70–140)

## 2022-05-19 PROCEDURE — 99999 PR PBB SHADOW E&M-EST. PATIENT-LVL II: CPT | Mod: PBBFAC,,, | Performed by: MIDWIFE

## 2022-05-19 PROCEDURE — 0500F INITIAL PRENATAL CARE VISIT: CPT | Mod: S$GLB,,, | Performed by: MIDWIFE

## 2022-05-19 PROCEDURE — 82950 GLUCOSE TEST: CPT | Performed by: MIDWIFE

## 2022-05-19 PROCEDURE — 0500F PR INITIAL PRENATAL CARE VISIT: ICD-10-PCS | Mod: S$GLB,,, | Performed by: MIDWIFE

## 2022-05-19 PROCEDURE — 85025 COMPLETE CBC W/AUTO DIFF WBC: CPT | Performed by: MIDWIFE

## 2022-05-19 PROCEDURE — 36415 COLL VENOUS BLD VENIPUNCTURE: CPT | Performed by: MIDWIFE

## 2022-05-19 PROCEDURE — 99999 PR PBB SHADOW E&M-EST. PATIENT-LVL II: ICD-10-PCS | Mod: PBBFAC,,, | Performed by: MIDWIFE

## 2022-05-19 PROCEDURE — 86592 SYPHILIS TEST NON-TREP QUAL: CPT | Performed by: MIDWIFE

## 2022-05-19 PROCEDURE — 87389 HIV-1 AG W/HIV-1&-2 AB AG IA: CPT | Performed by: MIDWIFE

## 2022-05-19 NOTE — PROGRESS NOTES
Patient here for Initial OB visit, has been seeing a midwife in [place] for 's job  Tawanna had her last 2 babies with our practice  Denies any complications this pregnancy, dating & anatomy scans both normal  Good fetal movement  Denies CTX, VB, LOF  Offered and declines TDAP  3T labs today  Requesting GC/Chlamydia collected at her next OV  20 lb 4.5 oz TWG, S=D  Kick counts and PTL precautions  EMMANUELLE sent  RTC in 2 weeks, sooner if needed

## 2022-05-20 PROBLEM — Z28.21 REFUSES TETANUS, DIPHTHERIA, AND ACELLULAR PERTUSSIS (TDAP) VACCINATION: Status: ACTIVE | Noted: 2022-05-20

## 2022-05-20 LAB
BASOPHILS # BLD AUTO: 0.03 K/UL (ref 0–0.2)
BASOPHILS NFR BLD: 0.3 % (ref 0–1.9)
DIFFERENTIAL METHOD: ABNORMAL
EOSINOPHIL # BLD AUTO: 0.2 K/UL (ref 0–0.5)
EOSINOPHIL NFR BLD: 1.6 % (ref 0–8)
ERYTHROCYTE [DISTWIDTH] IN BLOOD BY AUTOMATED COUNT: 13.8 % (ref 11.5–14.5)
HCT VFR BLD AUTO: 36.2 % (ref 37–48.5)
HGB BLD-MCNC: 11.5 G/DL (ref 12–16)
IMM GRANULOCYTES # BLD AUTO: 0.02 K/UL (ref 0–0.04)
IMM GRANULOCYTES NFR BLD AUTO: 0.2 % (ref 0–0.5)
LYMPHOCYTES # BLD AUTO: 1.7 K/UL (ref 1–4.8)
LYMPHOCYTES NFR BLD: 16.1 % (ref 18–48)
MCH RBC QN AUTO: 29.1 PG (ref 27–31)
MCHC RBC AUTO-ENTMCNC: 31.8 G/DL (ref 32–36)
MCV RBC AUTO: 92 FL (ref 82–98)
MONOCYTES # BLD AUTO: 0.6 K/UL (ref 0.3–1)
MONOCYTES NFR BLD: 5.8 % (ref 4–15)
NEUTROPHILS # BLD AUTO: 7.8 K/UL (ref 1.8–7.7)
NEUTROPHILS NFR BLD: 76 % (ref 38–73)
NRBC BLD-RTO: 0 /100 WBC
PLATELET # BLD AUTO: 253 K/UL (ref 150–450)
PMV BLD AUTO: 10.6 FL (ref 9.2–12.9)
RBC # BLD AUTO: 3.95 M/UL (ref 4–5.4)
RPR SER QL: NORMAL
WBC # BLD AUTO: 10.22 K/UL (ref 3.9–12.7)

## 2022-05-23 LAB — HIV 1+2 AB+HIV1 P24 AG SERPL QL IA: NEGATIVE

## 2022-06-06 ENCOUNTER — ROUTINE PRENATAL (OUTPATIENT)
Dept: OBSTETRICS AND GYNECOLOGY | Facility: CLINIC | Age: 32
End: 2022-06-06
Payer: COMMERCIAL

## 2022-06-06 VITALS
BODY MASS INDEX: 26.34 KG/M2 | WEIGHT: 178.38 LBS | SYSTOLIC BLOOD PRESSURE: 108 MMHG | DIASTOLIC BLOOD PRESSURE: 68 MMHG

## 2022-06-06 DIAGNOSIS — Z34.93 NORMAL PREGNANCY IN THIRD TRIMESTER: ICD-10-CM

## 2022-06-06 DIAGNOSIS — Z11.3 SCREENING FOR STD (SEXUALLY TRANSMITTED DISEASE): Primary | ICD-10-CM

## 2022-06-06 DIAGNOSIS — Z3A.30 30 WEEKS GESTATION OF PREGNANCY: ICD-10-CM

## 2022-06-06 PROCEDURE — 99999 PR PBB SHADOW E&M-EST. PATIENT-LVL II: CPT | Mod: PBBFAC,,, | Performed by: ADVANCED PRACTICE MIDWIFE

## 2022-06-06 PROCEDURE — 0502F PR SUBSEQUENT PRENATAL CARE: ICD-10-PCS | Mod: CPTII,S$GLB,, | Performed by: ADVANCED PRACTICE MIDWIFE

## 2022-06-06 PROCEDURE — 0502F SUBSEQUENT PRENATAL CARE: CPT | Mod: CPTII,S$GLB,, | Performed by: ADVANCED PRACTICE MIDWIFE

## 2022-06-06 PROCEDURE — 87491 CHLMYD TRACH DNA AMP PROBE: CPT | Performed by: ADVANCED PRACTICE MIDWIFE

## 2022-06-06 PROCEDURE — 99999 PR PBB SHADOW E&M-EST. PATIENT-LVL II: ICD-10-PCS | Mod: PBBFAC,,, | Performed by: ADVANCED PRACTICE MIDWIFE

## 2022-06-06 PROCEDURE — 87591 N.GONORRHOEAE DNA AMP PROB: CPT | Performed by: ADVANCED PRACTICE MIDWIFE

## 2022-06-08 LAB
C TRACH DNA SPEC QL NAA+PROBE: NOT DETECTED
N GONORRHOEA DNA SPEC QL NAA+PROBE: NOT DETECTED

## 2022-06-14 ENCOUNTER — PATIENT MESSAGE (OUTPATIENT)
Dept: ADMINISTRATIVE | Facility: OTHER | Age: 32
End: 2022-06-14

## 2022-06-20 ENCOUNTER — PATIENT MESSAGE (OUTPATIENT)
Dept: OBSTETRICS AND GYNECOLOGY | Facility: CLINIC | Age: 32
End: 2022-06-20

## 2022-06-28 ENCOUNTER — INITIAL PRENATAL (OUTPATIENT)
Dept: OBSTETRICS AND GYNECOLOGY | Facility: CLINIC | Age: 32
End: 2022-06-28
Payer: COMMERCIAL

## 2022-06-28 ENCOUNTER — ROUTINE PRENATAL (OUTPATIENT)
Dept: OBSTETRICS AND GYNECOLOGY | Facility: CLINIC | Age: 32
End: 2022-06-28
Payer: COMMERCIAL

## 2022-06-28 VITALS
WEIGHT: 179.69 LBS | SYSTOLIC BLOOD PRESSURE: 115 MMHG | DIASTOLIC BLOOD PRESSURE: 55 MMHG | BODY MASS INDEX: 26.53 KG/M2 | BODY MASS INDEX: 26.53 KG/M2 | SYSTOLIC BLOOD PRESSURE: 115 MMHG | WEIGHT: 179.69 LBS | DIASTOLIC BLOOD PRESSURE: 55 MMHG

## 2022-06-28 DIAGNOSIS — Z34.93 NORMAL PREGNANCY IN THIRD TRIMESTER: Primary | ICD-10-CM

## 2022-06-28 DIAGNOSIS — Z36.89 ENCOUNTER FOR ULTRASOUND TO CHECK FETAL GROWTH: ICD-10-CM

## 2022-06-28 PROCEDURE — 0502F SUBSEQUENT PRENATAL CARE: CPT | Mod: CPTII,S$GLB,, | Performed by: ADVANCED PRACTICE MIDWIFE

## 2022-06-28 PROCEDURE — 0502F PR SUBSEQUENT PRENATAL CARE: ICD-10-PCS | Mod: CPTII,S$GLB,, | Performed by: ADVANCED PRACTICE MIDWIFE

## 2022-06-28 PROCEDURE — 99499 UNLISTED E&M SERVICE: CPT | Mod: S$GLB,,, | Performed by: ADVANCED PRACTICE MIDWIFE

## 2022-06-28 PROCEDURE — 99499 NO LOS: ICD-10-PCS | Mod: S$GLB,,, | Performed by: ADVANCED PRACTICE MIDWIFE

## 2022-06-28 PROCEDURE — 99999 PR PBB SHADOW E&M-EST. PATIENT-LVL II: ICD-10-PCS | Mod: PBBFAC,,, | Performed by: ADVANCED PRACTICE MIDWIFE

## 2022-06-28 PROCEDURE — 99999 PR PBB SHADOW E&M-EST. PATIENT-LVL II: CPT | Mod: PBBFAC,,, | Performed by: ADVANCED PRACTICE MIDWIFE

## 2022-06-28 NOTE — PROGRESS NOTES
32 y.o. female  at 34w0d by US here for KATERINA  Reports + FM, denies VB, LOF or CTX  Doing well without concerns.   BP (!) 115/55   Wt 81.5 kg (179 lb 10.8 oz)   LMP 2021 (Exact Date)   BMI 26.53 kg/m²   TW lbs   Reviewed upcoming 36wk labs and growth US at [place], order placed  Reviewed warning signs, normal FKCs,  labor precautions and how/when to call.  RTC x 2 wks, call or present sooner prn.     B.Rosemarie

## 2022-07-11 ENCOUNTER — PROCEDURE VISIT (OUTPATIENT)
Dept: OBSTETRICS AND GYNECOLOGY | Facility: CLINIC | Age: 32
End: 2022-07-11
Payer: COMMERCIAL

## 2022-07-11 ENCOUNTER — ROUTINE PRENATAL (OUTPATIENT)
Dept: OBSTETRICS AND GYNECOLOGY | Facility: CLINIC | Age: 32
End: 2022-07-11
Payer: COMMERCIAL

## 2022-07-11 VITALS
WEIGHT: 181.88 LBS | SYSTOLIC BLOOD PRESSURE: 116 MMHG | DIASTOLIC BLOOD PRESSURE: 70 MMHG | BODY MASS INDEX: 26.86 KG/M2

## 2022-07-11 DIAGNOSIS — Z34.93 NORMAL PREGNANCY IN THIRD TRIMESTER: Primary | ICD-10-CM

## 2022-07-11 DIAGNOSIS — Z36.89 ENCOUNTER FOR ULTRASOUND TO CHECK FETAL GROWTH: ICD-10-CM

## 2022-07-11 PROCEDURE — 87081 CULTURE SCREEN ONLY: CPT | Performed by: ADVANCED PRACTICE MIDWIFE

## 2022-07-11 PROCEDURE — 99999 PR PBB SHADOW E&M-EST. PATIENT-LVL II: CPT | Mod: PBBFAC,,, | Performed by: ADVANCED PRACTICE MIDWIFE

## 2022-07-11 PROCEDURE — 76816 OB US FOLLOW-UP PER FETUS: CPT | Mod: S$GLB,,, | Performed by: OBSTETRICS & GYNECOLOGY

## 2022-07-11 PROCEDURE — 99999 PR PBB SHADOW E&M-EST. PATIENT-LVL II: ICD-10-PCS | Mod: PBBFAC,,, | Performed by: ADVANCED PRACTICE MIDWIFE

## 2022-07-11 PROCEDURE — 0502F PR SUBSEQUENT PRENATAL CARE: ICD-10-PCS | Mod: CPTII,S$GLB,, | Performed by: ADVANCED PRACTICE MIDWIFE

## 2022-07-11 PROCEDURE — 0502F SUBSEQUENT PRENATAL CARE: CPT | Mod: CPTII,S$GLB,, | Performed by: ADVANCED PRACTICE MIDWIFE

## 2022-07-11 PROCEDURE — 76816 US OB/GYN PROCEDURE (VIEWPOINT): ICD-10-PCS | Mod: S$GLB,,, | Performed by: OBSTETRICS & GYNECOLOGY

## 2022-07-11 NOTE — PROGRESS NOTES
32 y.o. female  at 35w6d by US  Reports + FM, denies VB, LOF or regular CTX  Doing well without concerns   /70   Wt 82.5 kg (181 lb 14.1 oz)   LMP 2021 (Exact Date)   BMI 26.86 kg/m²   TW lbs   Ultrasound today with cephalic presentation, posterior placenta, 3VC, MVP 5.3cm, EFW 43%, 6lb 5oz, reviewed with pt   GBS collected today   VE declined today   Reviewed warning signs, normal FKCs, labor precautions and how/when to call.  RTC x 2 wks, pt lives further away, call or present sooner prn.

## 2022-07-14 LAB — BACTERIA SPEC AEROBE CULT: NORMAL

## 2022-07-21 ENCOUNTER — OFFICE VISIT (OUTPATIENT)
Dept: DERMATOLOGY | Facility: CLINIC | Age: 32
End: 2022-07-21
Payer: COMMERCIAL

## 2022-07-21 ENCOUNTER — PATIENT MESSAGE (OUTPATIENT)
Dept: DERMATOLOGY | Facility: CLINIC | Age: 32
End: 2022-07-21

## 2022-07-21 DIAGNOSIS — L71.9 ROSACEA: Primary | ICD-10-CM

## 2022-07-21 DIAGNOSIS — L82.1 SEBORRHEIC KERATOSIS: ICD-10-CM

## 2022-07-21 PROCEDURE — 99203 OFFICE O/P NEW LOW 30 MIN: CPT | Mod: S$GLB,,, | Performed by: DERMATOLOGY

## 2022-07-21 PROCEDURE — 99999 PR PBB SHADOW E&M-EST. PATIENT-LVL II: ICD-10-PCS | Mod: PBBFAC,,, | Performed by: DERMATOLOGY

## 2022-07-21 PROCEDURE — 99203 PR OFFICE/OUTPT VISIT, NEW, LEVL III, 30-44 MIN: ICD-10-PCS | Mod: S$GLB,,, | Performed by: DERMATOLOGY

## 2022-07-21 PROCEDURE — 99999 PR PBB SHADOW E&M-EST. PATIENT-LVL II: CPT | Mod: PBBFAC,,, | Performed by: DERMATOLOGY

## 2022-07-21 PROCEDURE — 3044F PR MOST RECENT HEMOGLOBIN A1C LEVEL <7.0%: ICD-10-PCS | Mod: CPTII,S$GLB,, | Performed by: DERMATOLOGY

## 2022-07-21 PROCEDURE — 3044F HG A1C LEVEL LT 7.0%: CPT | Mod: CPTII,S$GLB,, | Performed by: DERMATOLOGY

## 2022-07-21 RX ORDER — AZELAIC ACID 0.15 G/G
GEL TOPICAL
Qty: 50 G | Refills: 3 | Status: SHIPPED | OUTPATIENT
Start: 2022-07-21 | End: 2022-08-10

## 2022-07-21 NOTE — PROGRESS NOTES
Subjective:       Patient ID:  Tawanna Harp is a 32 y.o. female who presents for   Chief Complaint   Patient presents with    Mole     History of Present Illness: The patient presents with chief complaint of mole.  Location: abdomen  Duration: 4 years  Signs/Symptoms: spot inside the belly button that she only notices at the end of her pregnancies (this is her third pregnancy); had laparoscopic surgery prior and thought it was a scar from that at first. Denies cyclical pain/swelling, no bleeding.    Prior treatments: none      Denies personal/fam h/o skin cancer    Patient complains of rash  Location: cheeks, nose, chin  Duration: several months currently  Symptoms: redness that is always there, then flares during pregnancy and gets red bumps and pus bumps; + burning  Relieving factors/Previous treatments: none    Flares with hot spicy foods                Review of Systems   Skin: Negative for daily sunscreen use, activity-related sunscreen use and recent sunburn.        Objective:    Physical Exam   Constitutional: She appears well-developed and well-nourished. No distress.   Neurological: She is alert and oriented to person, place, and time. She is not disoriented.   Psychiatric: She has a normal mood and affect.   Skin:   Areas Examined (abnormalities noted in diagram):   Head / Face Inspection Performed  Neck Inspection Performed  Chest / Axilla Inspection Performed  Abdomen Inspection Performed  Back Inspection Performed  RUE Inspected  LUE Inspection Performed                   Diagram Legend     Erythematous scaling macule/papule c/w actinic keratosis       Vascular papule c/w angioma      Pigmented verrucoid papule/plaque c/w seborrheic keratosis      Yellow umbilicated papule c/w sebaceous hyperplasia      Irregularly shaped tan macule c/w lentigo     1-2 mm smooth white papules consistent with Milia      Movable subcutaneous cyst with punctum c/w epidermal inclusion cyst      Subcutaneous  movable cyst c/w pilar cyst      Firm pink to brown papule c/w dermatofibroma      Pedunculated fleshy papule(s) c/w skin tag(s)      Evenly pigmented macule c/w junctional nevus     Mildly variegated pigmented, slightly irregular-bordered macule c/w mildly atypical nevus      Flesh colored to evenly pigmented papule c/w intradermal nevus       Pink pearly papule/plaque c/w basal cell carcinoma      Erythematous hyperkeratotic cursted plaque c/w SCC      Surgical scar with no sign of skin cancer recurrence      Open and closed comedones      Inflammatory papules and pustules      Verrucoid papule consistent consistent with wart     Erythematous eczematous patches and plaques     Dystrophic onycholytic nail with subungual debris c/w onychomycosis     Umbilicated papule    Erythematous-base heme-crusted tan verrucoid plaque consistent with inflamed seborrheic keratosis     Erythematous Silvery Scaling Plaque c/w Psoriasis     See annotation      Assessment / Plan:        Rosacea  -discussed etiology and nature of condition, management during pregnancy  -     azelaic acid (AZELEX) 15 % gel; Apply to face twice daily  Dispense: 50 g; Refill: 3  - consider sulfur wash  Patient instructed in importance in daily sun protection of at least spf 30. Mineral sunscreen ingredients preferred (Zinc +/- Titanium).       Seborrheic keratosis  These are benign inherited growths without a malignant potential. Reassurance given to patient. No treatment is necessary.

## 2022-07-26 ENCOUNTER — ROUTINE PRENATAL (OUTPATIENT)
Dept: OBSTETRICS AND GYNECOLOGY | Facility: CLINIC | Age: 32
End: 2022-07-26
Payer: COMMERCIAL

## 2022-07-26 VITALS
WEIGHT: 182.13 LBS | DIASTOLIC BLOOD PRESSURE: 72 MMHG | SYSTOLIC BLOOD PRESSURE: 110 MMHG | BODY MASS INDEX: 26.89 KG/M2

## 2022-07-26 DIAGNOSIS — Z34.93 NORMAL PREGNANCY IN THIRD TRIMESTER: Primary | ICD-10-CM

## 2022-07-26 PROCEDURE — 0502F PR SUBSEQUENT PRENATAL CARE: ICD-10-PCS | Mod: CPTII,S$GLB,, | Performed by: ADVANCED PRACTICE MIDWIFE

## 2022-07-26 PROCEDURE — 0502F SUBSEQUENT PRENATAL CARE: CPT | Mod: CPTII,S$GLB,, | Performed by: ADVANCED PRACTICE MIDWIFE

## 2022-07-26 PROCEDURE — 99999 PR PBB SHADOW E&M-EST. PATIENT-LVL III: CPT | Mod: PBBFAC,,, | Performed by: ADVANCED PRACTICE MIDWIFE

## 2022-07-26 PROCEDURE — 99999 PR PBB SHADOW E&M-EST. PATIENT-LVL III: ICD-10-PCS | Mod: PBBFAC,,, | Performed by: ADVANCED PRACTICE MIDWIFE

## 2022-07-26 NOTE — PROGRESS NOTES
32 y.o. female  at 38w0d by US here for KATERINA  Doing well without concerns. Ready for baby. Denies LOF, VB, or regular contractions.  Her sister delivered a girl this morning in [place], 9#4oz  /72   Wt 82.6 kg (182 lb 1.6 oz)   LMP 2021 (Exact Date)   BMI 26.89 kg/m²   TW lbs    GBS negative  Reviewed warning signs, normal FM/FKCs, labor precautions and how/when to call.  RTC x 1 wks, call or present sooner prn.     Gretel

## 2022-08-10 ENCOUNTER — ROUTINE PRENATAL (OUTPATIENT)
Dept: OBSTETRICS AND GYNECOLOGY | Facility: CLINIC | Age: 32
End: 2022-08-10
Payer: COMMERCIAL

## 2022-08-10 ENCOUNTER — PROCEDURE VISIT (OUTPATIENT)
Dept: OBSTETRICS AND GYNECOLOGY | Facility: CLINIC | Age: 32
End: 2022-08-10
Payer: COMMERCIAL

## 2022-08-10 VITALS — WEIGHT: 189.63 LBS | SYSTOLIC BLOOD PRESSURE: 107 MMHG | DIASTOLIC BLOOD PRESSURE: 76 MMHG | BODY MASS INDEX: 28 KG/M2

## 2022-08-10 DIAGNOSIS — Z36.89 ENCOUNTER FOR ULTRASOUND TO CHECK FETAL GROWTH: ICD-10-CM

## 2022-08-10 DIAGNOSIS — O48.0 POST-TERM PREGNANCY, 40-42 WEEKS OF GESTATION: Primary | ICD-10-CM

## 2022-08-10 PROCEDURE — 76819 FETAL BIOPHYS PROFIL W/O NST: CPT | Mod: S$GLB,,, | Performed by: OBSTETRICS & GYNECOLOGY

## 2022-08-10 PROCEDURE — 0502F SUBSEQUENT PRENATAL CARE: CPT | Mod: CPTII,S$GLB,, | Performed by: ADVANCED PRACTICE MIDWIFE

## 2022-08-10 PROCEDURE — 0502F PR SUBSEQUENT PRENATAL CARE: ICD-10-PCS | Mod: CPTII,S$GLB,, | Performed by: ADVANCED PRACTICE MIDWIFE

## 2022-08-10 PROCEDURE — 76819 US OB/GYN PROCEDURE (VIEWPOINT): ICD-10-PCS | Mod: S$GLB,,, | Performed by: OBSTETRICS & GYNECOLOGY

## 2022-08-10 PROCEDURE — 99999 PR PBB SHADOW E&M-EST. PATIENT-LVL II: CPT | Mod: PBBFAC,,, | Performed by: ADVANCED PRACTICE MIDWIFE

## 2022-08-10 PROCEDURE — 99999 PR PBB SHADOW E&M-EST. PATIENT-LVL II: ICD-10-PCS | Mod: PBBFAC,,, | Performed by: ADVANCED PRACTICE MIDWIFE

## 2022-08-10 NOTE — PROGRESS NOTES
32 y.o. female  at 40w1d by US here for KATERINA and PD US  Reports + FM, denies VB, LOF or regular CTX  Doing well without concerns, Ready for baby. Taking RRL tea, Evening primrose oil, walking, sex. Recommended nipple stimulation. Would like to avoid IOL until 42 weeks.  /76   Wt 86 kg (189 lb 9.5 oz)   LMP 2021 (Exact Date)   BMI 28.00 kg/m²   TW lbs   BPP today: The BPP is 8/8, reassuring, and the MVP is normal at 7cm. Cephalic presentation, posterior placenta.   Reviewed warning signs, normal FKCs, labor precautions and how/when to call.  RTC x 1 wks, call or present sooner prn.     Gretel

## 2022-08-13 ENCOUNTER — ANESTHESIA EVENT (OUTPATIENT)
Dept: OBSTETRICS AND GYNECOLOGY | Facility: HOSPITAL | Age: 32
End: 2022-08-13
Payer: COMMERCIAL

## 2022-08-13 ENCOUNTER — HOSPITAL ENCOUNTER (INPATIENT)
Facility: HOSPITAL | Age: 32
LOS: 2 days | Discharge: HOME | End: 2022-08-15
Attending: OBSTETRICS & GYNECOLOGY | Admitting: OBSTETRICS & GYNECOLOGY
Payer: COMMERCIAL

## 2022-08-13 ENCOUNTER — ANESTHESIA (OUTPATIENT)
Dept: OBSTETRICS AND GYNECOLOGY | Facility: HOSPITAL | Age: 32
End: 2022-08-13
Payer: COMMERCIAL

## 2022-08-13 DIAGNOSIS — Z37.9 NORMAL LABOR: ICD-10-CM

## 2022-08-13 LAB
ABO + RH BLD: NORMAL
BASOPHILS # BLD AUTO: 0.04 K/UL (ref 0–0.2)
BASOPHILS NFR BLD: 0.4 % (ref 0–1.9)
BLD GP AB SCN CELLS X3 SERPL QL: NORMAL
DIFFERENTIAL METHOD: NORMAL
EOSINOPHIL # BLD AUTO: 0.2 K/UL (ref 0–0.5)
EOSINOPHIL NFR BLD: 1.9 % (ref 0–8)
ERYTHROCYTE [DISTWIDTH] IN BLOOD BY AUTOMATED COUNT: 14.5 % (ref 11.5–14.5)
HCT VFR BLD AUTO: 37 % (ref 37–48.5)
HGB BLD-MCNC: 12.4 G/DL (ref 12–16)
IMM GRANULOCYTES # BLD AUTO: 0.03 K/UL (ref 0–0.04)
IMM GRANULOCYTES NFR BLD AUTO: 0.3 % (ref 0–0.5)
LYMPHOCYTES # BLD AUTO: 2.4 K/UL (ref 1–4.8)
LYMPHOCYTES NFR BLD: 24.1 % (ref 18–48)
MCH RBC QN AUTO: 29.2 PG (ref 27–31)
MCHC RBC AUTO-ENTMCNC: 33.5 G/DL (ref 32–36)
MCV RBC AUTO: 87 FL (ref 82–98)
MONOCYTES # BLD AUTO: 0.7 K/UL (ref 0.3–1)
MONOCYTES NFR BLD: 6.9 % (ref 4–15)
NEUTROPHILS # BLD AUTO: 6.6 K/UL (ref 1.8–7.7)
NEUTROPHILS NFR BLD: 66.4 % (ref 38–73)
NRBC BLD-RTO: 0 /100 WBC
PLATELET # BLD AUTO: 194 K/UL (ref 150–450)
PMV BLD AUTO: 11 FL (ref 9.2–12.9)
RBC # BLD AUTO: 4.24 M/UL (ref 4–5.4)
SARS-COV-2 RDRP RESP QL NAA+PROBE: NEGATIVE
WBC # BLD AUTO: 9.88 K/UL (ref 3.9–12.7)

## 2022-08-13 PROCEDURE — 59400 OBSTETRICAL CARE: CPT | Mod: ,,, | Performed by: MIDWIFE

## 2022-08-13 PROCEDURE — 72100002 HC LABOR CARE, 1ST 8 HOURS

## 2022-08-13 PROCEDURE — 11000001 HC ACUTE MED/SURG PRIVATE ROOM

## 2022-08-13 PROCEDURE — U0002 COVID-19 LAB TEST NON-CDC: HCPCS | Performed by: ADVANCED PRACTICE MIDWIFE

## 2022-08-13 PROCEDURE — 59400 PR FULL ROUT OBSTE CARE,VAGINAL DELIV: ICD-10-PCS | Mod: ,,, | Performed by: MIDWIFE

## 2022-08-13 PROCEDURE — 51701 INSERT BLADDER CATHETER: CPT

## 2022-08-13 PROCEDURE — 72200005 HC VAGINAL DELIVERY LEVEL II

## 2022-08-13 PROCEDURE — C1751 CATH, INF, PER/CENT/MIDLINE: HCPCS | Performed by: ANESTHESIOLOGY

## 2022-08-13 PROCEDURE — 72100003 HC LABOR CARE, EA. ADDL. 8 HRS

## 2022-08-13 PROCEDURE — 25000003 PHARM REV CODE 250: Performed by: MIDWIFE

## 2022-08-13 PROCEDURE — 63600175 PHARM REV CODE 636 W HCPCS: Performed by: ADVANCED PRACTICE MIDWIFE

## 2022-08-13 PROCEDURE — 63600175 PHARM REV CODE 636 W HCPCS: Performed by: MIDWIFE

## 2022-08-13 PROCEDURE — 86850 RBC ANTIBODY SCREEN: CPT | Performed by: ADVANCED PRACTICE MIDWIFE

## 2022-08-13 PROCEDURE — 63600175 PHARM REV CODE 636 W HCPCS: Performed by: NURSE ANESTHETIST, CERTIFIED REGISTERED

## 2022-08-13 PROCEDURE — 25000003 PHARM REV CODE 250: Performed by: NURSE ANESTHETIST, CERTIFIED REGISTERED

## 2022-08-13 PROCEDURE — 85025 COMPLETE CBC W/AUTO DIFF WBC: CPT | Performed by: ADVANCED PRACTICE MIDWIFE

## 2022-08-13 RX ORDER — MISOPROSTOL 200 UG/1
800 TABLET ORAL
Status: DISCONTINUED | OUTPATIENT
Start: 2022-08-13 | End: 2022-08-15 | Stop reason: HOSPADM

## 2022-08-13 RX ORDER — CARBOPROST TROMETHAMINE 250 UG/ML
250 INJECTION, SOLUTION INTRAMUSCULAR
Status: DISCONTINUED | OUTPATIENT
Start: 2022-08-13 | End: 2022-08-15 | Stop reason: HOSPADM

## 2022-08-13 RX ORDER — PROCHLORPERAZINE EDISYLATE 5 MG/ML
5 INJECTION INTRAMUSCULAR; INTRAVENOUS EVERY 6 HOURS PRN
Status: DISCONTINUED | OUTPATIENT
Start: 2022-08-13 | End: 2022-08-13

## 2022-08-13 RX ORDER — SIMETHICONE 80 MG
1 TABLET,CHEWABLE ORAL EVERY 6 HOURS PRN
Status: DISCONTINUED | OUTPATIENT
Start: 2022-08-13 | End: 2022-08-15 | Stop reason: HOSPADM

## 2022-08-13 RX ORDER — HYDROCODONE BITARTRATE AND ACETAMINOPHEN 5; 325 MG/1; MG/1
1 TABLET ORAL EVERY 4 HOURS PRN
Status: DISCONTINUED | OUTPATIENT
Start: 2022-08-13 | End: 2022-08-15 | Stop reason: HOSPADM

## 2022-08-13 RX ORDER — DOCUSATE SODIUM 100 MG/1
200 CAPSULE, LIQUID FILLED ORAL 2 TIMES DAILY PRN
Status: DISCONTINUED | OUTPATIENT
Start: 2022-08-13 | End: 2022-08-15 | Stop reason: HOSPADM

## 2022-08-13 RX ORDER — OXYTOCIN/RINGER'S LACTATE 30/500 ML
95 PLASTIC BAG, INJECTION (ML) INTRAVENOUS ONCE
Status: DISCONTINUED | OUTPATIENT
Start: 2022-08-13 | End: 2022-08-13

## 2022-08-13 RX ORDER — ACETAMINOPHEN 325 MG/1
650 TABLET ORAL EVERY 6 HOURS PRN
Status: DISCONTINUED | OUTPATIENT
Start: 2022-08-13 | End: 2022-08-15 | Stop reason: HOSPADM

## 2022-08-13 RX ORDER — IBUPROFEN 600 MG/1
600 TABLET ORAL EVERY 6 HOURS
Status: DISCONTINUED | OUTPATIENT
Start: 2022-08-13 | End: 2022-08-15 | Stop reason: HOSPADM

## 2022-08-13 RX ORDER — ONDANSETRON 8 MG/1
8 TABLET, ORALLY DISINTEGRATING ORAL EVERY 8 HOURS PRN
Status: DISCONTINUED | OUTPATIENT
Start: 2022-08-13 | End: 2022-08-13

## 2022-08-13 RX ORDER — PRENATAL WITH FERROUS FUM AND FOLIC ACID 3080; 920; 120; 400; 22; 1.84; 3; 20; 10; 1; 12; 200; 27; 25; 2 [IU]/1; [IU]/1; MG/1; [IU]/1; MG/1; MG/1; MG/1; MG/1; MG/1; MG/1; UG/1; MG/1; MG/1; MG/1; MG/1
1 TABLET ORAL DAILY
Status: DISCONTINUED | OUTPATIENT
Start: 2022-08-14 | End: 2022-08-15 | Stop reason: HOSPADM

## 2022-08-13 RX ORDER — OXYTOCIN/RINGER'S LACTATE 30/500 ML
334 PLASTIC BAG, INJECTION (ML) INTRAVENOUS ONCE
Status: DISCONTINUED | OUTPATIENT
Start: 2022-08-13 | End: 2022-08-13

## 2022-08-13 RX ORDER — LIDOCAINE HYDROCHLORIDE 10 MG/ML
10 INJECTION INFILTRATION; PERINEURAL ONCE AS NEEDED
Status: DISCONTINUED | OUTPATIENT
Start: 2022-08-13 | End: 2022-08-13

## 2022-08-13 RX ORDER — HYDROCORTISONE 25 MG/G
CREAM TOPICAL 3 TIMES DAILY PRN
Status: DISCONTINUED | OUTPATIENT
Start: 2022-08-13 | End: 2022-08-15 | Stop reason: HOSPADM

## 2022-08-13 RX ORDER — SODIUM CHLORIDE, SODIUM LACTATE, POTASSIUM CHLORIDE, CALCIUM CHLORIDE 600; 310; 30; 20 MG/100ML; MG/100ML; MG/100ML; MG/100ML
INJECTION, SOLUTION INTRAVENOUS CONTINUOUS
Status: DISCONTINUED | OUTPATIENT
Start: 2022-08-13 | End: 2022-08-13

## 2022-08-13 RX ORDER — PROCHLORPERAZINE EDISYLATE 5 MG/ML
5 INJECTION INTRAMUSCULAR; INTRAVENOUS EVERY 6 HOURS PRN
Status: DISCONTINUED | OUTPATIENT
Start: 2022-08-13 | End: 2022-08-15 | Stop reason: HOSPADM

## 2022-08-13 RX ORDER — OXYTOCIN/RINGER'S LACTATE 30/500 ML
95 PLASTIC BAG, INJECTION (ML) INTRAVENOUS ONCE
Status: DISCONTINUED | OUTPATIENT
Start: 2022-08-13 | End: 2022-08-15 | Stop reason: HOSPADM

## 2022-08-13 RX ORDER — OXYTOCIN/RINGER'S LACTATE 30/500 ML
0-30 PLASTIC BAG, INJECTION (ML) INTRAVENOUS CONTINUOUS
Status: DISCONTINUED | OUTPATIENT
Start: 2022-08-13 | End: 2022-08-13

## 2022-08-13 RX ORDER — DIPHENHYDRAMINE HYDROCHLORIDE 50 MG/ML
25 INJECTION INTRAMUSCULAR; INTRAVENOUS EVERY 4 HOURS PRN
Status: DISCONTINUED | OUTPATIENT
Start: 2022-08-13 | End: 2022-08-15 | Stop reason: HOSPADM

## 2022-08-13 RX ORDER — SODIUM CHLORIDE 0.9 % (FLUSH) 0.9 %
10 SYRINGE (ML) INJECTION
Status: DISCONTINUED | OUTPATIENT
Start: 2022-08-13 | End: 2022-08-15 | Stop reason: HOSPADM

## 2022-08-13 RX ORDER — DIPHENHYDRAMINE HCL 25 MG
25 CAPSULE ORAL EVERY 4 HOURS PRN
Status: DISCONTINUED | OUTPATIENT
Start: 2022-08-13 | End: 2022-08-15 | Stop reason: HOSPADM

## 2022-08-13 RX ORDER — DIPHENOXYLATE HYDROCHLORIDE AND ATROPINE SULFATE 2.5; .025 MG/1; MG/1
1 TABLET ORAL 4 TIMES DAILY PRN
Status: DISCONTINUED | OUTPATIENT
Start: 2022-08-13 | End: 2022-08-15 | Stop reason: HOSPADM

## 2022-08-13 RX ORDER — SIMETHICONE 80 MG
1 TABLET,CHEWABLE ORAL 4 TIMES DAILY PRN
Status: DISCONTINUED | OUTPATIENT
Start: 2022-08-13 | End: 2022-08-13

## 2022-08-13 RX ORDER — CALCIUM CARBONATE 200(500)MG
500 TABLET,CHEWABLE ORAL 3 TIMES DAILY PRN
Status: DISCONTINUED | OUTPATIENT
Start: 2022-08-13 | End: 2022-08-13

## 2022-08-13 RX ORDER — METHYLERGONOVINE MALEATE 0.2 MG/ML
200 INJECTION INTRAVENOUS
Status: DISCONTINUED | OUTPATIENT
Start: 2022-08-13 | End: 2022-08-15 | Stop reason: HOSPADM

## 2022-08-13 RX ORDER — TRANEXAMIC ACID 100 MG/ML
1000 INJECTION, SOLUTION INTRAVENOUS ONCE AS NEEDED
Status: DISCONTINUED | OUTPATIENT
Start: 2022-08-13 | End: 2022-08-13

## 2022-08-13 RX ORDER — ONDANSETRON 8 MG/1
8 TABLET, ORALLY DISINTEGRATING ORAL EVERY 8 HOURS PRN
Status: DISCONTINUED | OUTPATIENT
Start: 2022-08-13 | End: 2022-08-15 | Stop reason: HOSPADM

## 2022-08-13 RX ORDER — BUPIVACAINE HYDROCHLORIDE 2.5 MG/ML
INJECTION, SOLUTION INFILTRATION; PERINEURAL
Status: DISCONTINUED | OUTPATIENT
Start: 2022-08-13 | End: 2022-08-14

## 2022-08-13 RX ADMIN — ROPIVACAINE HYDROCHLORIDE 4 ML: 2 INJECTION, SOLUTION EPIDURAL; INFILTRATION at 01:08

## 2022-08-13 RX ADMIN — BUPIVACAINE HYDROCHLORIDE 8 ML: 2.5 INJECTION, SOLUTION INFILTRATION; PERINEURAL at 02:08

## 2022-08-13 RX ADMIN — IBUPROFEN 600 MG: 600 TABLET, FILM COATED ORAL at 11:08

## 2022-08-13 RX ADMIN — LIDOCAINE HYDROCHLORIDE,EPINEPHRINE BITARTRATE 3 ML: 15; .005 INJECTION, SOLUTION EPIDURAL; INFILTRATION; INTRACAUDAL; PERINEURAL at 01:08

## 2022-08-13 RX ADMIN — ROPIVACAINE HYDROCHLORIDE 14 ML/HR: 2 INJECTION, SOLUTION EPIDURAL; INFILTRATION at 01:08

## 2022-08-13 RX ADMIN — Medication 2 MILLI-UNITS/MIN: at 09:08

## 2022-08-13 RX ADMIN — SODIUM CHLORIDE, SODIUM LACTATE, POTASSIUM CHLORIDE, AND CALCIUM CHLORIDE: .6; .31; .03; .02 INJECTION, SOLUTION INTRAVENOUS at 09:08

## 2022-08-13 NOTE — PROGRESS NOTES
"LABOR NOTE    S:  Pt resting comfortably on birth ball, no complaints.  Family at bedside and supportive.     O: /77   Pulse 76   Temp 98 °F (36.7 °C) (Oral)   Resp 18   Ht 5' 9" (1.753 m)   Wt 85.7 kg (189 lb)   LMP 2021 (Exact Date)   Breastfeeding No   BMI 27.91 kg/m²     GENERAL: Calm and appropriate affect  NEURO: Alert, oriented, normal speech  ABDOMEN: Nontender, Fundus palpates soft between UC's.  FHT: Baseline 140, moderate BTBV, positive accels, no decels. Cat 1, reassuring.  CTX: irregular  SVE: deferred      ASSESSMENT:   32 y.o.  IUP at 40w4d, FHT reassuring/ Cat 1    Patient Active Problem List   Diagnosis    Normal labor    Female cystocele    Post-term pregnancy, 40-42 weeks of gestation    Refuses tetanus, diphtheria, and acellular pertussis (Tdap) vaccination         PLAN:  Continue close Maternal/Fetal monitoring  Pitocin Augmentation per protocol   Recheck 2 hours or PRN  Epidural per anesthesia at pt's request        "

## 2022-08-13 NOTE — L&D DELIVERY NOTE
[facility] - Labor [facility] Delivery  Vaginal Delivery   Operative Note    SUMMARY     Normal spontaneous vaginal delivery of live infant, was placed on mothers abdomen for skin to skin and bulb suctioning performed.  Infant delivered position MADDI over intact perineum.  Nuchal cord: No.    Spontaneous delivery of placenta and IV pitocin given noting good uterine tone.  No lacerations noted.  Patient tolerated delivery well. Sponge needle and lap counted correctly x2.    Indications:  (spontaneous vaginal delivery)  Pregnancy complicated by:   Patient Active Problem List   Diagnosis     (spontaneous vaginal delivery)    Outcome of delivery, single liveborn    Female cystocele    Post-term pregnancy, 40-42 weeks of gestation    Refuses tetanus, diphtheria, and acellular pertussis (Tdap) vaccination     Admitting GA: 40w4d    Delivery Information for Garry Harp    Birth information:  YOB: 2023   Time of birth: 3:03 PM   Sex: male   Head Delivery Date/Time: 2022  3:02 PM   Delivery type: Vaginal, Spontaneous   Gestational Age: 40w4d    Delivery Providers    Delivering clinician: Guillermo Fernandez CNM   Provider Role    Hermelinda Dowell Medical Student    Anya Torres RN Registered Nurse    Mimi Payne RN Registered Nurse    Tita Jean-Baptiste RN Registered Nurse    Yon Murphy, [facility]            Measurements    Weight:   Length:          Apgars    Living status: Living  Apgars:  1 min.:  5 min.:  10 min.:  15 min.:  20 min.:    Skin color:  1  1       Heart rate:  2  2       Reflex irritability:  2  2       Muscle tone:  2  2       Respiratory effort:  2  2       Total:  9  9       Apgars assigned by: MIMI PAYNE RN         Operative Delivery    Forceps attempted?: No  Vacuum extractor attempted?: No         Shoulder Dystocia    Shoulder dystocia present?: No           Presentation    Presentation: Vertex  Position: Occiput Anterior           Interventions/Resuscitation     Method: Bulb Suctioning, Tactile Stimulation, NICU Attended       Cord    Vessels: 3 vessels  Complications: None  Delayed Cord Clamping?: Yes  Cord Clamped Date/Time: 2022  3:06 PM  Cord Blood Disposition: Lab  Gases Sent?: No  Stem Cell Collection (by MD): No       Placenta    Placenta delivery date/time: 2022 1509  Placenta removal: Spontaneous  Placenta appearance: Intact  Placenta disposition: discarded           Labor Events:       labor: No     Labor Onset Date/Time:         Dilation Complete Date/Time: 2022 15:00     Start Pushing Date/Time: 2022 15:01       Start Pushing Date/Time: 2022 15:01     Rupture Date/Time:            Rupture type:          Fluid Amount:       Fluid Color:       Fluid Odor:       Membrane Status:                steroids: None     Antibiotics given for GBS: No     Induction:       Indications for induction:        Augmentation:       Indications for augmentation:       Labor complications: None     Additional complications:          Cervical ripening:                     Delivery:      Episiotomy: None     Indication for Episiotomy:       Perineal Lacerations: None Repaired:      Periurethral Laceration:   Repaired:     Labial Laceration:   Repaired:     Sulcus Laceration:   Repaired:     Vaginal Laceration:   Repaired:     Cervical Laceration:   Repaired:     Repair suture: None     Repair # of packets: 0     Last Value - EBL - Nursing (mL):       Sum - EBL - Nursing (mL): 0     Last Value - EBL - Anesthesia (mL):      Calculated QBL (mL):       Vaginal Sweep Performed: Yes     Surgicount Correct: Yes       Other providers:       Anesthesia    Method: Epidural          Details (if applicable):  Trial of Labor      Categorization:      Priority:     Indications for :     Incision Type:       Additional  information:  Forceps:    Vacuum:    Breech:    Observed anomalies    Other (Comments):

## 2022-08-13 NOTE — ANESTHESIA PREPROCEDURE EVALUATION
08/13/2022  Tawanna Harp is a 32 y.o., female.      Pre-op Assessment    I have reviewed the Patient Summary Reports.     I have reviewed the Nursing Notes. I have reviewed the NPO Status.   I have reviewed the Medications.     Review of Systems  Anesthesia Hx:  Denies Family Hx of Anesthesia complications.   Denies Personal Hx of Anesthesia complications.   Social:  Non-Smoker, No Alcohol Use    Hematology/Oncology:  Hematology Normal   Oncology Normal     EENT/Dental:EENT/Dental Normal   Cardiovascular:  Cardiovascular Normal     Pulmonary:  Pulmonary Normal    Renal/:  Renal/ Normal     Hepatic/GI:  Hepatic/GI Normal    Musculoskeletal:   Arthritis     Neurological:  Neurology Normal    Endocrine:  Endocrine Normal    Dermatological:  Skin Normal    Psych:  Psychiatric Normal           Physical Exam  General: Well nourished, Cooperative, Alert and Oriented    Airway:  Mallampati: II   Mouth Opening: Normal  TM Distance: Normal  Tongue: Normal  Neck ROM: Normal ROM    Dental:  Intact        Anesthesia Plan  Type of Anesthesia, risks & benefits discussed:    Anesthesia Type: Epidural  Informed Consent: Informed consent signed with the Patient and all parties understand the risks and agree with anesthesia plan.  All questions answered. Patient consented to blood products? Yes  ASA Score: 2    Ready For Surgery From Anesthesia Perspective.     .

## 2022-08-13 NOTE — H&P
[facility] - Labor [facility] Delivery  Obstetrics  History & Physical    Patient Name: Tawanna Harp  MRN: [id number]  Admission Date: 2022  Primary Care Provider: Primary Doctor No    Subjective:     Principal Problem:Normal labor    History of Present Illness:  Presents with contractions, postdates, fetal heart rate deceleration x's 2      Obstetric HPI:  Patient reports  contractions, active fetal movement, No vaginal bleeding , No loss of fluid     This pregnancy has been complicated by post dates    OB History    Para Term  AB Living   5 2 2 0 2 2   SAB IAB Ectopic Multiple Live Births   2 0 0 0 1      # Outcome Date GA Lbr Jarad/2nd Weight Sex Delivery Anes PTL Lv   5 Current            4 Term 20 40w2d   F INDUCTION         Birth Comments: breech inversion done x2, induction with vaginal delivery    3 Term 18 41w4d  3.81 kg (8 lb 6.4 oz) F Vag-Spont None N SUNIL      Name: KELL, PETE CONTRERAS      Apgar1: 9  Apgar5: 9   2 SAB            1 SAB              Past Medical History:   Diagnosis Date    Arthritis     neck     Past Surgical History:   Procedure Laterality Date    Resection of Uterine Septum         No medications prior to admission.       Review of patient's allergies indicates:  No Known Allergies     Family History       Problem Relation (Age of Onset)    Cancer Sister    Diabetes Paternal Grandmother          Tobacco Use    Smoking status: Never Smoker    Smokeless tobacco: Never Used   Substance and Sexual Activity    Alcohol use: No     Comment: cooks with it    Drug use: No    Sexual activity: Yes     Partners: Male     Review of Systems   Gastrointestinal:  Positive for abdominal pain.   All other systems reviewed and are negative.   Objective:     Vital Signs (Most Recent):    Vital Signs (24h Range):           There is no height or weight on file to calculate BMI.    FHT: Cat 2 (reassuring), moderate variability, intermittent late decelerations6  TOCO:   Q 6 minutes    Physical Exam:   Constitutional: She is oriented to person, place, and time. She appears well-developed and well-nourished.        Pulmonary/Chest: Effort normal.        Abdominal: Soft.     Genitourinary:    Uterus normal.             Musculoskeletal: Normal range of motion and moves all extremeties.       Neurological: She is alert and oriented to person, place, and time.    Skin: Skin is warm and dry.    Psychiatric: She has a normal mood and affect. Her behavior is normal. Judgment and thought content normal.     Cervix:  380/V/-2 per RN     Significant Labs:  Lab Results   Component Value Date    GROUPTRH O POS 2022    HEPBSAG Negative 2022    STREPBCULT No Group B Streptococcus isolated 2022       I have personallly reviewed all pertinent lab results from the last 24 hours.    Assessment/Plan:     32 y.o. female  at 40w4d for:    * Normal labor  Admit for labor management  Plans epidural        Erika Ferreira CNM  Obstetrics  [facility]

## 2022-08-13 NOTE — HOSPITAL COURSE
Admit  Augmentation PRN  Epidural when desires    8/14/22 PPD1: routine pp orders  8/15/22- PPD2, discharge instructions discussed

## 2022-08-13 NOTE — SUBJECTIVE & OBJECTIVE
Obstetric HPI:  Patient reports  contractions, active fetal movement, No vaginal bleeding , No loss of fluid     This pregnancy has been complicated by post dates    OB History    Para Term  AB Living   5 2 2 0 2 2   SAB IAB Ectopic Multiple Live Births   2 0 0 0 1      # Outcome Date GA Lbr Jarad/2nd Weight Sex Delivery Anes PTL Lv   5 Current            4 Term 20 40w2d   F INDUCTION         Birth Comments: breech inversion done x2, induction with vaginal delivery    3 Term 18 41w4d  3.81 kg (8 lb 6.4 oz) F Vag-Spont None N SUNIL      Name: PETE CASTORENA      Apgar1: 9  Apgar5: 9   2 SAB            1 SAB              Past Medical History:   Diagnosis Date    Arthritis     neck     Past Surgical History:   Procedure Laterality Date    Resection of Uterine Septum  2018       No medications prior to admission.       Review of patient's allergies indicates:  No Known Allergies     Family History       Problem Relation (Age of Onset)    Cancer Sister    Diabetes Paternal Grandmother          Tobacco Use    Smoking status: Never Smoker    Smokeless tobacco: Never Used   Substance and Sexual Activity    Alcohol use: No     Comment: cooks with it    Drug use: No    Sexual activity: Yes     Partners: Male     Review of Systems   Gastrointestinal:  Positive for abdominal pain.   All other systems reviewed and are negative.   Objective:     Vital Signs (Most Recent):    Vital Signs (24h Range):           There is no height or weight on file to calculate BMI.    FHT: Cat 2 (reassuring), moderate variability, intermittent late decelerations6  TOCO:  Q 6 minutes    Physical Exam:   Constitutional: She is oriented to person, place, and time. She appears well-developed and well-nourished.        Pulmonary/Chest: Effort normal.        Abdominal: Soft.     Genitourinary:    Uterus normal.             Musculoskeletal: Normal range of motion and moves all extremeties.       Neurological: She is alert  and oriented to person, place, and time.    Skin: Skin is warm and dry.    Psychiatric: She has a normal mood and affect. Her behavior is normal. Judgment and thought content normal.     Cervix:  3/80/V/-2 per RN     Significant Labs:  Lab Results   Component Value Date    GROUPTRH O POS 01/18/2022    HEPBSAG Negative 01/18/2022    STREPBCULT No Group B Streptococcus isolated 07/11/2022       I have personallly reviewed all pertinent lab results from the last 24 hours.

## 2022-08-14 PROBLEM — O48.0 POST-TERM PREGNANCY, 40-42 WEEKS OF GESTATION: Status: RESOLVED | Noted: 2022-05-19 | Resolved: 2022-08-14

## 2022-08-14 PROCEDURE — 25000003 PHARM REV CODE 250: Performed by: MIDWIFE

## 2022-08-14 PROCEDURE — 11000001 HC ACUTE MED/SURG PRIVATE ROOM

## 2022-08-14 PROCEDURE — 62326 NJX INTERLAMINAR LMBR/SAC: CPT | Performed by: NURSE ANESTHETIST, CERTIFIED REGISTERED

## 2022-08-14 RX ORDER — ROPIVACAINE HYDROCHLORIDE 2 MG/ML
INJECTION, SOLUTION EPIDURAL; INFILTRATION; PERINEURAL
Status: DISCONTINUED | OUTPATIENT
Start: 2022-08-13 | End: 2022-08-14

## 2022-08-14 RX ORDER — LIDOCAINE HYDROCHLORIDE AND EPINEPHRINE 15; 5 MG/ML; UG/ML
INJECTION, SOLUTION EPIDURAL
Status: DISCONTINUED | OUTPATIENT
Start: 2022-08-13 | End: 2022-08-14

## 2022-08-14 RX ADMIN — IBUPROFEN 600 MG: 600 TABLET, FILM COATED ORAL at 06:08

## 2022-08-14 RX ADMIN — IBUPROFEN 600 MG: 600 TABLET, FILM COATED ORAL at 11:08

## 2022-08-14 RX ADMIN — Medication 1 TABLET: at 08:08

## 2022-08-14 RX ADMIN — IBUPROFEN 600 MG: 600 TABLET, FILM COATED ORAL at 05:08

## 2022-08-14 RX ADMIN — DOCUSATE SODIUM 200 MG: 100 CAPSULE, LIQUID FILLED ORAL at 12:08

## 2022-08-14 NOTE — LACTATION NOTE
Visited mother on L&D. Infant sleeping in crib at the bedside. Mother verbalized that she has pain with latching on the right breast but comfortable latching infant to left breast. Mother verbalized that her two previous children were fed breast milk via exclusive pumping because despite having their lip and tongue ties revised, neither children latched well at the breast. Mother verbalized that she has a spectra and a motif breast pumps at home that she obtained via her insurance company. Mother stated that her goal is to breast feed for a year if she can. She is not above pumping but would love for this baby to latch.     Mother verbalizes understanding of expected  behaviors and output for the first 48 hours of life.  Discussed the importance of cue based feedings on demand, unrestricted access to the breast, and frequent uninterrupted skin to skin contact.  Risk and implications of artificial nipples and non medically indicated formula supplementation discussed.      Mother denies any further lactation needs or concerns at this time. Encouraged mother to call for assistance when desired or when infant is showing signs of hunger. Mother verbalizes understanding of all education and counseling.

## 2022-08-14 NOTE — ANESTHESIA POSTPROCEDURE EVALUATION
Anesthesia Post Evaluation    Patient: Tawanna Harp    Procedure(s) Performed: * No procedures listed *    Final Anesthesia Type: epidural      Patient location during evaluation: labor & delivery  Patient participation: Yes- Able to Participate  Level of consciousness: awake and alert and oriented  Post-procedure vital signs: reviewed and stable  Pain management: adequate  Airway patency: patent    PONV status at discharge: No PONV  Anesthetic complications: no      Cardiovascular status: blood pressure returned to baseline, stable and hemodynamically stable  Respiratory status: unassisted, room air and spontaneous ventilation  Hydration status: euvolemic  Follow-up not needed.          Vitals Value Taken Time   /72 08/14/22 0741   Temp 36.5 °C (97.7 °F) 08/14/22 0741   Pulse 70 08/14/22 0741   Resp 18 08/14/22 0741   SpO2 98 % 08/14/22 0741         No case tracking events are documented in the log.      Pain/Lise Score: Pain Rating Prior to Med Admin: 4 (8/14/2022  5:04 AM)  Lise Score: 9 (8/13/2022  3:45 PM)

## 2022-08-14 NOTE — LACTATION NOTE
This note was copied from a baby's chart.  [employer] sym[employer] breast pump and pumping kit brought to mother's room. Mother in bathroom. Nurse instructed mother to call when she was ready for nurse to review pump set up with her and pump teaching. Mother verbalized understanding and stated she would call lactation nurse when she was ready. Lactation contact information and availability this shift provided.

## 2022-08-14 NOTE — PROGRESS NOTES
[facility] ([facility])  Obstetrics  Postpartum Progress Note    Patient Name: Tawanna Harp  MRN: [id number]  Admission Date: 2022  Hospital Length of Stay: 1 days  Attending Physician: Yessy Henriquez MD  Primary Care Provider: Primary Doctor No    Subjective:     Principal Problem: (spontaneous vaginal delivery)    Hospital Course:  Admit  Augmentation PRN  Epidural when desires    22 PPD1: routine pp orders      Interval History:     She is doing well this morning. She is tolerating a regular diet without nausea or vomiting. She is voiding spontaneously. She is ambulating. She has not passed flatus, and has not a BM. Vaginal bleeding is mild. She denies fever or chills. Abdominal pain is mild and controlled with oral medications. She Is breastfeeding. She desires circumcision for her male baby: yes.    Objective:     Vital Signs (Most Recent):  Temp: 97.7 °F (36.5 °C) (22)  Pulse: 70 (22)  Resp: 18 (22)  BP: 109/72 (22)  SpO2: 98 % (22) Vital Signs (24h Range):  Temp:  [97.6 °F (36.4 °C)-98.7 °F (37.1 °C)] 97.7 °F (36.5 °C)  Pulse:  [63-78] 70  Resp:  [18] 18  SpO2:  [98 %-100 %] 98 %  BP: (103-130)/(60-87) 109/72     Weight: 85.7 kg (189 lb)  Body mass index is 27.91 kg/m².      Intake/Output Summary (Last 24 hours) at 2022 0829  Last data filed at 2022 0430  Gross per 24 hour   Intake 417.05 ml   Output 1600 ml   Net -1182.95 ml         Significant Labs:  Lab Results   Component Value Date    GROUPTRH O POS 2022    HEPBSAG Negative 2022    STREPBCULT No Group B Streptococcus isolated 2022     Recent Labs   Lab 22  0538   HGB 12.4   HCT 37.0       I have personallly reviewed all pertinent lab results from the last 24 hours.    Physical Exam:   Constitutional: She is oriented to person, place, and time. She appears well-developed and well-nourished.       Cardiovascular:  Normal rate.              Pulmonary/Chest: Effort normal. No respiratory distress.        Abdominal: Soft.     Genitourinary:    Vagina and uterus normal.             Musculoskeletal: Moves all extremeties.       Neurological: She is alert and oriented to person, place, and time.    Skin: Skin is warm and dry.    Psychiatric: She has a normal mood and affect.     Assessment/Plan:     32 y.o. female  for:    *  (spontaneous vaginal delivery)  22 PPD1: routine pp orders    Outcome of delivery, single liveborn  Single live female under care of peds        Disposition: As patient meets milestones, will plan to discharge tomorrow.    Riddhi Quesada CNM  Obstetrics  [facility] ([facility])

## 2022-08-14 NOTE — SUBJECTIVE & OBJECTIVE
Interval History:     She is doing well this morning. She is tolerating a regular diet without nausea or vomiting. She is voiding spontaneously. She is ambulating. She has not passed flatus, and has not a BM. Vaginal bleeding is mild. She denies fever or chills. Abdominal pain is mild and controlled with oral medications. She Is breastfeeding. She desires circumcision for her male baby: yes.    Objective:     Vital Signs (Most Recent):  Temp: 97.7 °F (36.5 °C) (08/14/22 0741)  Pulse: 70 (08/14/22 0741)  Resp: 18 (08/14/22 0741)  BP: 109/72 (08/14/22 0741)  SpO2: 98 % (08/14/22 0741) Vital Signs (24h Range):  Temp:  [97.6 °F (36.4 °C)-98.7 °F (37.1 °C)] 97.7 °F (36.5 °C)  Pulse:  [63-78] 70  Resp:  [18] 18  SpO2:  [98 %-100 %] 98 %  BP: (103-130)/(60-87) 109/72     Weight: 85.7 kg (189 lb)  Body mass index is 27.91 kg/m².      Intake/Output Summary (Last 24 hours) at 8/14/2022 0829  Last data filed at 8/14/2022 0430  Gross per 24 hour   Intake 417.05 ml   Output 1600 ml   Net -1182.95 ml         Significant Labs:  Lab Results   Component Value Date    GROUPTRH O POS 08/13/2022    HEPBSAG Negative 01/18/2022    STREPBCULT No Group B Streptococcus isolated 07/11/2022     Recent Labs   Lab 08/13/22  0538   HGB 12.4   HCT 37.0       I have personallly reviewed all pertinent lab results from the last 24 hours.    Physical Exam:   Constitutional: She is oriented to person, place, and time. She appears well-developed and well-nourished.       Cardiovascular:  Normal rate.             Pulmonary/Chest: Effort normal. No respiratory distress.        Abdominal: Soft.     Genitourinary:    Vagina and uterus normal.             Musculoskeletal: Moves all extremeties.       Neurological: She is alert and oriented to person, place, and time.    Skin: Skin is warm and dry.    Psychiatric: She has a normal mood and affect.

## 2022-08-14 NOTE — ANESTHESIA PROCEDURE NOTES
Epidural    Patient location during procedure: OB   Reason for block: primary anesthetic   Reason for block: labor analgesia requested by patient and obstetrician  Diagnosis: IUP Labor   Start time: 8/13/2022 1:28 PM  Timeout: 8/13/2022 1:27 PM  End time: 8/13/2022 1:44 PM    Staffing  Performing Provider: Michael Lord CRNA  Authorizing Provider: Michael Lord CRNA        Preanesthetic Checklist  Completed: patient identified, IV checked, risks and benefits discussed, monitors and equipment checked, pre-op evaluation, timeout performed, anesthesia consent given, hand hygiene performed and patient being monitored  Preparation  Patient position: sitting  Prep: Betadine  Patient monitoring: Pulse Ox and Blood Pressure  Reason for block: primary anesthetic   Epidural  Skin Anesthetic: lidocaine 1%  Skin Wheal: 2 mL  Administration type: continuous  Approach: midline  Interspace: L4-5    Injection technique: TROY air  Needle and Epidural Catheter  Needle type: Tuohy   Needle gauge: 17  Needle length: 3.5 inches  Needle insertion depth: 5 cm  Catheter type: springwound and multi-orifice  Catheter size: 19 G  Catheter at skin depth: 12 cm  Insertion Attempts: 1  Test dose: 3 mL of lidocaine 1.5% with Epi 1-to-200,000  Additional Documentation: negative aspiration for heme and CSF, incremental injection, no signs/symptoms of IV or SA injection, no significant complaints from patient, no significant pain on injection and no paresthesia on injection  Needle localization: anatomical landmarks  Assessment  Ease of block: easy  Patient's tolerance of the procedure: comfortable throughout block and no complaints No inadvertent dural puncture with Tuohy.  Dural puncture not performed with spinal needle

## 2022-08-14 NOTE — LACTATION NOTE
This note was copied from a baby's chart.  Lactation Rounds: Upon entering room Mother in bed eating breakfast and infant in father's arms. Infant Feeding frequency and output WNL. Mother reports that infant latches well to left breast but does not latch well to right breast. Redness and soreness noted to nipple. Mother has nipple shells in bra and has been doing nipple care with feeds. Mother reports that she as heard swallows when infant is feeding on the left but none when infant feeds on the right. Mother verbalized pain with latching and between breast feedings. Mother reports that pain is 2/10 between feedings and 5+/10 when infant is feeding.     Reviewed hand expression with mother and verbalized to mother that she could hand express and feed infant with syringe. Mother verbalized that she did not have a desire to hand express and feed infant but she would like to pump to express colostrum and stimulate breast since infant may not be transferring milk well at the breast.     Discussed mechanism of milk production and maintenance. Encouraged frequent feeds based on early cues, unrestricted access to the breast and frequent skin to skin contact. Discussed expected feeding and output pattern for day of life 2. Reinforced normalcy and importance of cluster feeding. Nipple care reviewed.    Mother verbalizes understanding of all education and counseling. Mother denies any further lactation needs or concerns at this time. Discussed lactation availability. Encouraged mother to call for assistance when needs arise.

## 2022-08-14 NOTE — LACTATION NOTE
Lactation Rounds:   Couplet in MB room now. Infant is sleeping comfortably in the the bassinet, no feeding cues noted. Encouraged mother to call for latching assistance and assessment.     Reviewed expected  behaviors, feeding patterns and output for the first 48 hours of life. Discussed the importance of cue based feedings on demand, unrestricted access to the breast, and frequent uninterrupted skin to skin contact. Signs of good  ment and effective milk transfer discussed. Risk and implications of artificial nipples and non medically indicated formula supplementation discussed.      Mother denies any further lactation needs or concerns at this time. Encouraged mother to call for assistance when desired or when infant is showing signs of hunger. Mother verbalizes understanding of all education and counseling.

## 2022-08-14 NOTE — PLAN OF CARE
VSS. Denies pain. Seems to be bonding well with infant. Fundus firm, bleeding light. Still numb from epidural, so unable to stand yet.

## 2022-08-14 NOTE — PLAN OF CARE
Patient afebrile and had no falls this shift. Fundus firm without massage and below umbilicus. Bleeding light, no clots passed this shift. Voids spontaneously. Ambulates independently. Pain well controlled with oral pain medication. Vital signs stable at this time. Bonding well with infant, breastfeeding; responds to infant cues and participates in infant care. Will continue to monitor.

## 2022-08-15 VITALS
BODY MASS INDEX: 27.99 KG/M2 | HEART RATE: 79 BPM | RESPIRATION RATE: 18 BRPM | SYSTOLIC BLOOD PRESSURE: 113 MMHG | OXYGEN SATURATION: 99 % | DIASTOLIC BLOOD PRESSURE: 76 MMHG | HEIGHT: 69 IN | TEMPERATURE: 98 F | WEIGHT: 189 LBS

## 2022-08-15 PROCEDURE — 25000003 PHARM REV CODE 250: Performed by: MIDWIFE

## 2022-08-15 RX ORDER — IBUPROFEN 600 MG/1
600 TABLET ORAL EVERY 6 HOURS PRN
Qty: 30 TABLET | Refills: 0 | Status: SHIPPED | OUTPATIENT
Start: 2022-08-15

## 2022-08-15 RX ADMIN — IBUPROFEN 600 MG: 600 TABLET, FILM COATED ORAL at 05:08

## 2022-08-15 RX ADMIN — IBUPROFEN 600 MG: 600 TABLET, FILM COATED ORAL at 12:08

## 2022-08-15 RX ADMIN — Medication 1 TABLET: at 09:08

## 2022-08-15 NOTE — PLAN OF CARE
08/15/22 0903   OB SCREEN   Assessment Type Discharge Planning Assessment   Source of Information health record   Received Prenatal Care Yes   Any indications/suspicions for None   Is this a teen pregnancy No   Is the baby in NICU No   Indication for adoption/Safe Haven No   Indication for DME/post-acute needs No   HIV (+) No   Any congenital  disorders No   Fetal demise/ death No

## 2022-08-15 NOTE — PLAN OF CARE
Pt is stable at this time. C/o minimal cramping pain. Managed with Motrin q6. Voids appropriately, has not reported a stool during the shift. Fundus is firm, bleeding is light. Ambulates well. Currently breastfeeding infant w/ EBM via syringe and using formula for supplementation (Kendamil formula). Safety measures maintained. Will continue to monitor.

## 2022-08-15 NOTE — DISCHARGE SUMMARY
[facility] ([facility])  Obstetrics  Discharge Summary      Patient Name: Tawanna Harp  MRN: [id number]  Admission Date: 2022  Hospital Length of Stay: 2 days  Discharge Date and Time:  08/15/2022 9:46 AM  Attending Physician: Yessy Henriquez MD   Discharging Provider: Priya Marrero CNM   Primary Care Provider: Primary Doctor No    HPI: Presents with contractions, postdates, fetal heart rate deceleration x's 2          * No surgery found *     Hospital Course:   Admit  Augmentation PRN  Epidural when desires    22 PPD1: routine pp orders  8/15/22- PPD2, discharge instructions discussed            Final Active Diagnoses:    Diagnosis Date Noted POA    PRINCIPAL PROBLEM:   (spontaneous vaginal delivery) [O80] 2020 Not Applicable    Outcome of delivery, single liveborn [Z37.0] 2020 Not Applicable      Problems Resolved During this Admission:    Diagnosis Date Noted Date Resolved POA    Normal labor [O80, Z37.9] 2018 Not Applicable        Significant Diagnostic Studies: Labs: All labs within the past 24 hours have been reviewed      Feeding Method: breast    Immunizations     None          Delivery:    Episiotomy: None   Lacerations: None   Repair suture: None   Repair # of packets: 0   Blood loss (ml):       Birth information:  YOB: 2023   Time of birth: 3:03 PM   Sex: male   Delivery type: Vaginal, Spontaneous   Gestational Age: 40w4d    Delivery Clinician:      Other providers:       Additional  information:  Forceps:    Vacuum:    Breech:    Observed anomalies      Living?:           APGARS  One minute Five minutes Ten minutes   Skin color:         Heart rate:         Grimace:         Muscle tone:         Breathing:         Totals: 9  9        Placenta: Delivered:       appearance    Pending Diagnostic Studies:     None          Discharged Condition: good    Disposition: Home or Self Care    Follow Up:   Follow-up Information      Felipe Elliott CNM Follow up in 4 week(s).    Specialty: Obstetrics and Gynecology  Contact information:  [address]  [place] [address] [phone]                       Patient Instructions:      Diet Adult Regular     Pelvic Rest     Notify your health care provider if you experience any of the following:  temperature >100.4     Notify your health care provider if you experience any of the following:  persistent nausea and vomiting or diarrhea     Notify your health care provider if you experience any of the following:  severe uncontrolled pain     Notify your health care provider if you experience any of the following:  difficulty breathing or increased cough     Notify your health care provider if you experience any of the following:  severe persistent headache     Notify your health care provider if you experience any of the following:  persistent dizziness, light-headedness, or visual disturbances     Notify your health care provider if you experience any of the following:  increased confusion or weakness     Notify your health care provider if you experience any of the following:   Order Comments: Increased vaginal bleeding, dizziness, headache, blurred vision, and/or signs of postpartum depression     Medications:  Current Discharge Medication List      START taking these medications    Details   ibuprofen (ADVIL,MOTRIN) 600 MG tablet Take 1 tablet (600 mg total) by mouth every 6 (six) hours as needed for Pain.  Qty: 30 tablet, Refills: 0    Comments: Please deliver to bedside 428             Priya Marrero CNM  Obstetrics  [facility] ([facility])

## 2022-08-15 NOTE — LACTATION NOTE
This note was copied from a baby's chart.  Visited mother at bedside. Mother has infant in cross cradle hold to right breast. Mother verbalized that she was feeling no pain or discomfort with current feeding. Reported that she hears swallows and nipple shape upon unlatching infant to left breast wnl. She reported that breastfeeding was going well and that she had no questions or concerns at this time.     Mother anticipates discharge home today. Reviewed signs of good attachment. Reviewed breast massage and compression during feedings and indications for use. Reviewed signs of effective milk transfer and instructed to call pediatrician and lactation if signs not present. Discussed expected feeding and output pattern for days of life 2, 3, 4, & 5+; mother instructed to call pediatrician and lactation if infant is not meeting feeding and output goals.     Reviewed signs of engorgement and expectant management. Reviewed signs of mastitis and instructed mother to call OB provider and lactation if any signs present. Discussed proper use of First Alert Form. Reviewed proper milk handling, collection and storage guidelines. Reviewed nursing diet and nutrition. Discussed resources for medication safety while breastfeeding. Reviewed available outpatient lactation resources.     Mother verbalizes understanding of all education and counseling; she denies any further lactation needs or concerns at this time. Encouraged mother to contact lactation with any questions, concerns, or problems, contact number provided.

## 2022-08-15 NOTE — LACTATION NOTE
This note was copied from a baby's chart.  Lactation Rounds: Infant weight -7%. Infant voids and stools WNL. Mother breast and formula feeding infant at this time. Upon entering room, mother feeding infant at left breast. Mother verbalized that she has decrease in pain/discomfort of nipples with feeding infant at the breast from 5/10 yesterday when infant attaches and soreness/pain between feedings has decreased from 2/10 to 0/10. Mother continues to wear nipple shells in bra to aid in comfort of sore nipples. Mother states that infant feeding more effectively on right breast compared to yesterday and is hopeful about using left and right sides for feedings. Mother verbalizes that infant feedings have improved from yesterday and she feels that breast feeding going okay but she will ask for assistance if needed.     Midwife entered room. Informed mother that nurse would return for discharge teaching.

## 2022-08-15 NOTE — LACTATION NOTE
Infant is with Primary Nurse doing PKU tests in the nursery at this time. Infant is gaggy and had a small emesis consists of colostrum and clear fluids noted. . Top-center lip blister noted. A thick maxillary labial frenum noted. Top lip flanged outwards without blanching. Good suck patterns noted.     Infant has a -7% weight loss per primary nurse at this time. Voiding (x2) and stooling (x3) wnl.     I offered to bring infant back to mother's room. Mother was notified on the weight loss and emesis. Mother states that breast feeding is going well. She reports having sore nipples with initial latch then eased up. Offered to assist with latching, mother denies. Encouraged mother to feed on demand and allow time to burp infant after he gets off the breast. Reviewed signs of good attachment and effective milk transfer. Mother verbalizes understanding and denies lactation assistance at this time. Mother to call for assistance as needed.

## 2022-08-15 NOTE — DISCHARGE INSTRUCTIONS
"Mother Self Care:    Activity: Avoid strenuous exercise and get adequate rest.  No driving until the physician consent given.  Emotional Changes: Most women find birth to be a time of great emotional upheaval.  Sense of loss, mood swings, fatigue, anxiety, and feeling "let down" are common.  If feelings worsen or last more than a week, call your physician.  Breast Care/Breastfeeding: Wear a bra for comfort.  Keep nipples dry and apply your own breast milk or lanolin cream as needed for soreness.  Engorgement can be relieved with warm, moist heat before feedings.  You may also take Ibuprofen.  Breast Care/Bottle Feeding: Wear support bra 24 hours a day for one week.  Avoid stimulation to breasts.  You may use ice packs for discomfort.  Rudolph-Care/Vaginal Bleeding: Remember to use your rudolph-bottle after urinating.  Your flow will change from red, to pink, to yellow/white color over a period of 2 weeks.  Menstruation will return in 3-8 weeks, or longer if breastfeeding.  Episiotomy Vaginal Delivery: Stitches will dissolve within 10 days to 3 weeks.  Warm baths, tucks, and dermoplast will promote healing.  Avoid bubble baths or strong soaps.   Section/Tubal Ligation: Keep incision clean and dry. You may shower, but avoid baths.  Sexual Activity/Pelvic Rest: No sexual activity, tampons, or douching until your physician gives you consent.  Diet: Continue to eat from the five basic food groups, including plenty of protein, fruits, vegetables, and whole grains.  Limit empty calories and high fat foods.  Drink enough fluids to satisfy thirst and add an extra 500 calories for breastfeeding.  Constipation/Hemorrhoids: Drink plenty of water.  You may take a stool softener or natural laxative (Metamucil). You may use tucks or hemorrhoid ointment and soak in a warm tub.    CALL YOUR OB DOCTOR IF ANY OF THE FOLLOWING OCCURS:  *Heavy bleeding - saturating a pad an hour or passing any large (2-3 inches in size) blood " clots.  *Any pain, redness, or tenderness in lower leg.  *You cannot care for yourself or your baby.  *Any signs of infection-      - Temperature greater than 100.5 degrees F      - Foul smelling vaginal discharge and/or incisional drainage      - Increased episiotomy or incisional pain      - Hot, hard, red or sore area on breast      - Flu-like symptoms      - Any urgency, frequency or burning with urination    Return To the Hospital for further Evaluation:  Headache not relieved by tylenol or ibuprofen  Blurry vision, double vision, seeing spots, or flashing lights  Feeling faint or passing out  Right epigastric pain  Difficulty breathing  Swelling in hands, face, or feet  Any of these symptoms accompanied by nausea/vomiting  Gaining more than 5 pounds in one week  Seizures  These symptoms could be an indication of elevated blood pressure.       If you have any questions that need to be answered immediately please call the Labor & Delivery Unit at [phone] and ask to speak to a nurse.

## 2022-08-15 NOTE — PLAN OF CARE
Plan of care reviewed. Vital signs stable. Tolerating diet. Voiding and ambulating independently. Mom is responding to feeding cues. Bonding with baby. Ready for discharge.

## 2022-09-19 ENCOUNTER — POSTPARTUM VISIT (OUTPATIENT)
Dept: OBSTETRICS AND GYNECOLOGY | Facility: CLINIC | Age: 32
End: 2022-09-19
Payer: COMMERCIAL

## 2022-09-19 VITALS
WEIGHT: 171.06 LBS | SYSTOLIC BLOOD PRESSURE: 108 MMHG | DIASTOLIC BLOOD PRESSURE: 78 MMHG | BODY MASS INDEX: 25.34 KG/M2 | HEIGHT: 69 IN

## 2022-09-19 PROBLEM — Z28.21 REFUSES TETANUS, DIPHTHERIA, AND ACELLULAR PERTUSSIS (TDAP) VACCINATION: Status: RESOLVED | Noted: 2022-05-20 | Resolved: 2022-09-19

## 2022-09-19 PROCEDURE — 0503F POSTPARTUM CARE VISIT: CPT | Mod: CPTII,S$GLB,, | Performed by: ADVANCED PRACTICE MIDWIFE

## 2022-09-19 PROCEDURE — 0503F PR POSTPARTUM CARE VISIT: ICD-10-PCS | Mod: CPTII,S$GLB,, | Performed by: ADVANCED PRACTICE MIDWIFE

## 2022-09-19 PROCEDURE — 99999 PR PBB SHADOW E&M-EST. PATIENT-LVL III: ICD-10-PCS | Mod: PBBFAC,,, | Performed by: ADVANCED PRACTICE MIDWIFE

## 2022-09-19 PROCEDURE — 99999 PR PBB SHADOW E&M-EST. PATIENT-LVL III: CPT | Mod: PBBFAC,,, | Performed by: ADVANCED PRACTICE MIDWIFE

## 2022-09-19 NOTE — PROGRESS NOTES
"Subjective:       Tawanna Harp is a 32 y.o. female who presents for a postpartum visit. She is 4 weeks postpartum following a spontaneous vaginal delivery. I have fully reviewed the prenatal and intrapartum course. The delivery was at 40.4 gestational weeks. Outcome: spontaneous vaginal delivery. Anesthesia: epidural. Postpartum course has been uncomplicated. Baby's course has been complicated with lip and tongue tie. Baby is feeding by pumped breastmilk. Bleeding no bleeding. Bowel function is normal. Bladder function is normal. Patient is not sexually active. Contraception method is none. Postpartum depression screening: negative.    The following portions of the patient's history were reviewed and updated as appropriate: allergies, current medications, past family history, past medical history, past social history, past surgical history, and problem list.    Review of Systems  Pertinent items are noted in HPI.     Objective:      /78   Ht 5' 9" (1.753 m)   Wt 77.6 kg (171 lb 1.2 oz)   LMP 11/02/2021 (Exact Date)   Breastfeeding Yes   BMI 25.26 kg/m²    General:  alert, appears stated age, and cooperative               Abdomen: normal findings: soft, non-tender    Vulva:  normal   Vagina: normal vagina, no discharge, exudate, lesion, or erythema   Cervix:  no cervical motion tenderness   Corpus: normal size, contour, position, consistency, mobility, non-tender   Adnexa:  normal adnexa and no mass, fullness, tenderness   Rectal Exam: Not performed.          Assessment:      Routine postpartum exam. Pap smear not done at today's visit.     Plan:      1. Contraception: none  2. Next pap due 10/2023  3. Follow up in: 1  year for annual  or as needed.     "

## 2023-05-16 NOTE — PROGRESS NOTES
"Tawanna Harp is a 27 y.o. female  presents today for follow up of recurrent miscarriage and to discuss recent pelvic ultrasound.  No complaints.  Menses have returned to being regular since her last miscarriage.  She is using natural progesterone cream for luteal phase support.  She has signs of being ovulatory.  She has had two first trimester miscarriages in 2017.      Pelvic ultrasound shows partial septate uterus.      History reviewed. No pertinent past medical history.  History reviewed. No pertinent surgical history.  Family History   Problem Relation Age of Onset    Diabetes Paternal Grandmother     Cancer Sister      Social History   Substance Use Topics    Smoking status: Never Smoker    Smokeless tobacco: Never Used    Alcohol use No      Comment: cooks with it     OB History    Para Term  AB Living   2       2     SAB TAB Ectopic Multiple Live Births   2              # Outcome Date GA Lbr Jarad/2nd Weight Sex Delivery Anes PTL Lv   2 SAB            1 SAB                   /70   Ht 5' 9" (1.753 m)   Wt 68.9 kg (152 lb)   LMP 2017   BMI 22.45 kg/m²     ROS:  GENERAL: No fever, chills, fatigability or weight loss.  VULVAR: No pain, no lesions and no itching.  VAGINAL: No relaxation, no itching, no discharge, no abnormal bleeding and no lesions.  ABDOMEN: No abdominal pain. Denies nausea. Denies vomiting. No diarrhea. No constipation  BREAST: Denies pain. No lumps. No discharge.  URINARY: No incontinence, no nocturia, no frequency and no dysuria.      PE: deferred    ASSESSMENT:    1. Recurrent pregnancy loss without current pregnancy     2. Septate uterus             PLAN  Ultrasound findings reviewed with patient.  Patient counseled regarding risks vs benefits of removal of uterine septum with hysteroscope.  Because of 2 early pregnancy losses now, she would like to proceed with hysteroscopic removal of uterine septum.  Will schedule this.            " How Severe Is It?: moderate Is This A New Presentation, Or A Follow-Up?: Follow Up Isotretinoin

## 2024-08-29 NOTE — PROGRESS NOTES
Tawanna Harp is a 30 y.o. female  presents for repeat pap due to unsatisfactory on previous 2020 pap, HPV though was negative.   LMP: No LMP recorded..    Pt is postpartum     Past Medical History:   Diagnosis Date    Arthritis     neck     Past Surgical History:   Procedure Laterality Date    DILATION AND CURETTAGE OF UTERUS      pt denies this procedure; says she only had the uterine septum surgery.     Resection of Uterine Septum  2018     Social History     Socioeconomic History    Marital status:      Spouse name: Not on file    Number of children: Not on file    Years of education: Not on file    Highest education level: Not on file   Occupational History    Not on file   Social Needs    Financial resource strain: Not on file    Food insecurity     Worry: Not on file     Inability: Not on file    Transportation needs     Medical: Not on file     Non-medical: Not on file   Tobacco Use    Smoking status: Never Smoker    Smokeless tobacco: Never Used   Substance and Sexual Activity    Alcohol use: No     Comment: cooks with it    Drug use: No    Sexual activity: Yes     Partners: Male   Lifestyle    Physical activity     Days per week: Not on file     Minutes per session: Not on file    Stress: Not on file   Relationships    Social connections     Talks on phone: Not on file     Gets together: Not on file     Attends Spiritism service: Not on file     Active member of club or organization: Not on file     Attends meetings of clubs or organizations: Not on file     Relationship status: Not on file   Other Topics Concern    Not on file   Social History Narrative    Not on file     Family History   Problem Relation Age of Onset    Diabetes Paternal Grandmother     Cancer Sister      OB History        4    Para   1    Term   1            AB   2    Living   1       SAB   2    TAB        Ectopic        Multiple   0    Live Births   1               He lost weight between visits.   BMI 25.39.  Prediabetes.  Fasting glucose 102.  A1c 5.3%.  Low HDL with elevated triglycerides.  I recommended healthy eating, weight loss and 30 minutes of aerobic exercise 5 days a week I also recommended return appointment to see me in February 2025 and recheck fasting blood work at that time.   "      BP (!) 102/58   Ht 5' 9" (1.753 m)   Wt 72.1 kg (158 lb 15.2 oz)   BMI 23.47 kg/m²       ROS:  GENERAL: Denies weight gain or weight loss. Feeling well overall.   SKIN: Denies rash or lesions.   HEAD: Denies head injury or headache.   NODES: Denies enlarged lymph nodes.   CHEST: Denies chest pain or shortness of breath.   CARDIOVASCULAR: Denies palpitations or left sided chest pain.   ABDOMEN: No abdominal pain, constipation, diarrhea, nausea, vomiting or rectal bleeding.   URINARY: No frequency, dysuria, hematuria, or burning on urination.  REPRODUCTIVE: See HPI.   BREASTS: The patient performs breast self-examination and denies pain, lumps, or nipple discharge.   HEMATOLOGIC: No easy bruisability or excessive bleeding.   MUSCULOSKELETAL: Denies joint pain or swelling.   NEUROLOGIC: Denies syncope or weakness.   PSYCHIATRIC: Denies depression, anxiety or mood swings.    PHYSICAL EXAM:  APPEARANCE: Well nourished, well developed, in no acute distress.  AFFECT: WNL, alert and oriented x 3  SKIN: No acne or hirsutism  PELVIC: Normal external genitalia without lesions.  Normal hair distribution.  Adequate perineal body, normal urethral meatus.  Vagina moist and well rugated without lesions or discharge.  Cervix pink, without lesions, discharge or tenderness.  No significant cystocele or rectocele.  Bimanual exam shows uterus to be normal size, regular, mobile and nontender.  Adnexa without masses or tenderness.    EXTREMITIES: No edema.  Physical Exam    1. Unsatisfactory cervical Papanicolaou smear  Liquid-Based Pap Smear, Screening    AND PLAN:    Patient was counseled today on A.C.S. Pap guidelines and recommendations for yearly pelvic exams, mammograms and monthly self breast exams; to see her PCP for other health maintenance.                   "

## (undated) DEVICE — GLOVE PROTEXIS HYDROGEL SZ6.5

## (undated) DEVICE — CONTAINER SPECIMEN STRL 4OZ

## (undated) DEVICE — NDL PNEUMO INSUFFLATI 120MM

## (undated) DEVICE — SEE MEDLINE ITEM 152739

## (undated) DEVICE — TROCAR ENDOPATH XCEL 5X100MM

## (undated) DEVICE — PACK DRAPE PERI/GYN TIBURON

## (undated) DEVICE — TUBING HEATED INSUFFLATOR

## (undated) DEVICE — SET CYSTO IRRIGATION UNIV SPIK

## (undated) DEVICE — GLOVE PROTEXIS HYDROGEL SZ7

## (undated) DEVICE — CATH URETHRAL 18FR

## (undated) DEVICE — SEE MEDLINE ITEM 157027

## (undated) DEVICE — DEVICE MYOSURE REACH

## (undated) DEVICE — TUBE SET AQUILEX FLUID CONTROL

## (undated) DEVICE — GLOVE PROTEXIS HYDROGEL SZ6

## (undated) DEVICE — SEE L#152161

## (undated) DEVICE — CORD LAP 10 DISP

## (undated) DEVICE — SUPPORT ULNA NERVE PROTECTOR

## (undated) DEVICE — ELECTRODE REM PLYHSV RETURN 9

## (undated) DEVICE — SEE MEDLINE ITEM 157117

## (undated) DEVICE — SOL NS 1000CC

## (undated) DEVICE — SEE MEDLINE ITEM 154981

## (undated) DEVICE — DRESSING TELFA N ADH 3X8

## (undated) DEVICE — SUT CTD VICRYL PLUS 4/0

## (undated) DEVICE — PAD ABD 8X10 STERILE

## (undated) DEVICE — SEE MEDLINE ITEM 157181

## (undated) DEVICE — POSITIONER HEAD DONUT 9IN FOAM

## (undated) DEVICE — SEE MEDLINE ITEM 152622

## (undated) DEVICE — SEAL LENS SCOPE MYOSURE

## (undated) DEVICE — HEMOSTAT SURGICEL 2X3IN

## (undated) DEVICE — MANIFOLD 4 PORT